# Patient Record
Sex: MALE | Race: WHITE | NOT HISPANIC OR LATINO
[De-identification: names, ages, dates, MRNs, and addresses within clinical notes are randomized per-mention and may not be internally consistent; named-entity substitution may affect disease eponyms.]

---

## 2017-09-10 ENCOUNTER — TRANSCRIPTION ENCOUNTER (OUTPATIENT)
Age: 42
End: 2017-09-10

## 2017-10-21 ENCOUNTER — TRANSCRIPTION ENCOUNTER (OUTPATIENT)
Age: 42
End: 2017-10-21

## 2018-01-21 ENCOUNTER — TRANSCRIPTION ENCOUNTER (OUTPATIENT)
Age: 43
End: 2018-01-21

## 2018-08-07 ENCOUNTER — APPOINTMENT (OUTPATIENT)
Dept: COLORECTAL SURGERY | Facility: CLINIC | Age: 43
End: 2018-08-07
Payer: COMMERCIAL

## 2018-08-07 VITALS — WEIGHT: 315 LBS | RESPIRATION RATE: 14 BRPM | HEIGHT: 75 IN | BODY MASS INDEX: 39.17 KG/M2

## 2018-08-07 DIAGNOSIS — Z85.831 PERSONAL HISTORY OF MALIGNANT NEOPLASM OF SOFT TISSUE: ICD-10-CM

## 2018-08-07 DIAGNOSIS — Z86.2 PERSONAL HISTORY OF DISEASES OF THE BLOOD AND BLOOD-FORMING ORGANS AND CERTAIN DISORDERS INVOLVING THE IMMUNE MECHANISM: ICD-10-CM

## 2018-08-07 DIAGNOSIS — D50.0 IRON DEFICIENCY ANEMIA SECONDARY TO BLOOD LOSS (CHRONIC): ICD-10-CM

## 2018-08-07 DIAGNOSIS — Z87.09 PERSONAL HISTORY OF OTHER DISEASES OF THE RESPIRATORY SYSTEM: ICD-10-CM

## 2018-08-07 DIAGNOSIS — Z71.9 COUNSELING, UNSPECIFIED: ICD-10-CM

## 2018-08-07 DIAGNOSIS — Z82.49 FAMILY HISTORY OF ISCHEMIC HEART DISEASE AND OTHER DISEASES OF THE CIRCULATORY SYSTEM: ICD-10-CM

## 2018-08-07 DIAGNOSIS — Z87.891 PERSONAL HISTORY OF NICOTINE DEPENDENCE: ICD-10-CM

## 2018-08-07 DIAGNOSIS — Z86.69 PERSONAL HISTORY OF OTHER DISEASES OF THE NERVOUS SYSTEM AND SENSE ORGANS: ICD-10-CM

## 2018-08-07 DIAGNOSIS — Z86.79 PERSONAL HISTORY OF OTHER DISEASES OF THE CIRCULATORY SYSTEM: ICD-10-CM

## 2018-08-07 PROBLEM — Z00.00 ENCOUNTER FOR PREVENTIVE HEALTH EXAMINATION: Status: ACTIVE | Noted: 2018-08-07

## 2018-08-07 PROCEDURE — 99205 OFFICE O/P NEW HI 60 MIN: CPT

## 2018-08-07 RX ORDER — NEOMYCIN SULFATE 500 MG/1
500 TABLET ORAL
Qty: 6 | Refills: 0 | Status: COMPLETED | COMMUNITY
Start: 2018-08-07 | End: 2018-08-08

## 2018-08-08 ENCOUNTER — TRANSCRIPTION ENCOUNTER (OUTPATIENT)
Age: 43
End: 2018-08-08

## 2018-08-11 PROBLEM — D50.0 IRON DEFICIENCY ANEMIA DUE TO CHRONIC BLOOD LOSS: Status: ACTIVE | Noted: 2018-08-11

## 2018-08-11 PROBLEM — Z85.831 HISTORY OF SARCOMA: Status: RESOLVED | Noted: 2018-08-07 | Resolved: 2018-08-11

## 2018-08-11 PROBLEM — Z86.2 HISTORY OF ANEMIA: Status: RESOLVED | Noted: 2018-08-07 | Resolved: 2018-08-11

## 2018-08-11 PROBLEM — Z86.79 HISTORY OF HYPERTENSION: Status: RESOLVED | Noted: 2018-08-07 | Resolved: 2018-08-11

## 2018-08-11 PROBLEM — Z87.09 HISTORY OF ASTHMA: Status: RESOLVED | Noted: 2018-08-07 | Resolved: 2018-08-11

## 2018-08-11 PROBLEM — Z87.891 FORMER SMOKER: Status: ACTIVE | Noted: 2018-08-07

## 2018-08-11 PROBLEM — Z86.69 HISTORY OF SLEEP APNEA: Status: RESOLVED | Noted: 2018-08-07 | Resolved: 2018-08-11

## 2018-08-11 PROBLEM — Z82.49 FAMILY HISTORY OF CARDIAC DISORDER: Status: ACTIVE | Noted: 2018-08-07

## 2018-08-11 PROBLEM — Z71.9 ENCOUNTER FOR CONSULTATION: Status: ACTIVE | Noted: 2018-08-07

## 2018-08-13 ENCOUNTER — OTHER (OUTPATIENT)
Age: 43
End: 2018-08-13

## 2018-08-15 ENCOUNTER — OTHER (OUTPATIENT)
Age: 43
End: 2018-08-15

## 2018-08-16 ENCOUNTER — RESULT REVIEW (OUTPATIENT)
Age: 43
End: 2018-08-16

## 2018-08-16 ENCOUNTER — APPOINTMENT (OUTPATIENT)
Dept: COLORECTAL SURGERY | Facility: AMBULATORY MEDICAL SERVICES | Age: 43
End: 2018-08-16
Payer: COMMERCIAL

## 2018-08-16 ENCOUNTER — OUTPATIENT (OUTPATIENT)
Dept: OUTPATIENT SERVICES | Facility: HOSPITAL | Age: 43
LOS: 1 days | Discharge: HOME | End: 2018-08-16
Payer: COMMERCIAL

## 2018-08-16 VITALS
SYSTOLIC BLOOD PRESSURE: 130 MMHG | TEMPERATURE: 98 F | HEIGHT: 74 IN | OXYGEN SATURATION: 96 % | HEART RATE: 91 BPM | RESPIRATION RATE: 18 BRPM | WEIGHT: 315 LBS | DIASTOLIC BLOOD PRESSURE: 84 MMHG

## 2018-08-16 DIAGNOSIS — C19 MALIGNANT NEOPLASM OF RECTOSIGMOID JUNCTION: ICD-10-CM

## 2018-08-16 DIAGNOSIS — Z41.9 ENCOUNTER FOR PROCEDURE FOR PURPOSES OTHER THAN REMEDYING HEALTH STATE, UNSPECIFIED: Chronic | ICD-10-CM

## 2018-08-16 DIAGNOSIS — Z90.89 ACQUIRED ABSENCE OF OTHER ORGANS: Chronic | ICD-10-CM

## 2018-08-16 DIAGNOSIS — Z98.890 OTHER SPECIFIED POSTPROCEDURAL STATES: Chronic | ICD-10-CM

## 2018-08-16 DIAGNOSIS — Z53.8 PROCEDURE AND TREATMENT NOT CARRIED OUT FOR OTHER REASONS: ICD-10-CM

## 2018-08-16 DIAGNOSIS — Z29.9 ENCOUNTER FOR PROPHYLACTIC MEASURES, UNSPECIFIED: ICD-10-CM

## 2018-08-16 DIAGNOSIS — Z01.818 ENCOUNTER FOR OTHER PREPROCEDURAL EXAMINATION: ICD-10-CM

## 2018-08-16 LAB
ABO RH CONFIRMATION: SIGNIFICANT CHANGE UP
ALBUMIN SERPL ELPH-MCNC: 4.4 G/DL — SIGNIFICANT CHANGE UP (ref 3.3–5)
ALP SERPL-CCNC: 69 U/L — SIGNIFICANT CHANGE UP (ref 40–120)
ALT FLD-CCNC: 36 U/L — SIGNIFICANT CHANGE UP (ref 12–78)
ANION GAP SERPL CALC-SCNC: 7 MMOL/L — SIGNIFICANT CHANGE UP (ref 5–17)
ANISOCYTOSIS BLD QL: SIGNIFICANT CHANGE UP
APTT BLD: 36 SEC — SIGNIFICANT CHANGE UP (ref 27.5–37.4)
AST SERPL-CCNC: 25 U/L — SIGNIFICANT CHANGE UP (ref 15–37)
BASOPHILS # BLD AUTO: 0.05 K/UL — SIGNIFICANT CHANGE UP (ref 0–0.2)
BASOPHILS NFR BLD AUTO: 0.9 % — SIGNIFICANT CHANGE UP (ref 0–2)
BILIRUB DIRECT SERPL-MCNC: 0.2 MG/DL — SIGNIFICANT CHANGE UP (ref 0–0.2)
BILIRUB INDIRECT FLD-MCNC: 0.7 MG/DL — SIGNIFICANT CHANGE UP (ref 0.2–1)
BILIRUB SERPL-MCNC: 0.9 MG/DL — SIGNIFICANT CHANGE UP (ref 0.2–1.2)
BLD GP AB SCN SERPL QL: SIGNIFICANT CHANGE UP
BUN SERPL-MCNC: 8 MG/DL — SIGNIFICANT CHANGE UP (ref 7–23)
CALCIUM SERPL-MCNC: 8.9 MG/DL — SIGNIFICANT CHANGE UP (ref 8.5–10.1)
CEA SERPL-MCNC: 17.4 NG/ML — HIGH (ref 0–3.8)
CHLORIDE SERPL-SCNC: 105 MMOL/L — SIGNIFICANT CHANGE UP (ref 96–108)
CO2 SERPL-SCNC: 26 MMOL/L — SIGNIFICANT CHANGE UP (ref 22–31)
CREAT SERPL-MCNC: 1.02 MG/DL — SIGNIFICANT CHANGE UP (ref 0.5–1.3)
DACRYOCYTES BLD QL SMEAR: SLIGHT — SIGNIFICANT CHANGE UP
ELLIPTOCYTES BLD QL SMEAR: SLIGHT — SIGNIFICANT CHANGE UP
EOSINOPHIL # BLD AUTO: 0.24 K/UL — SIGNIFICANT CHANGE UP (ref 0–0.5)
EOSINOPHIL NFR BLD AUTO: 4.4 % — SIGNIFICANT CHANGE UP (ref 0–6)
GLUCOSE SERPL-MCNC: 115 MG/DL — HIGH (ref 70–99)
HBA1C BLD-MCNC: 5 % — SIGNIFICANT CHANGE UP (ref 4–5.6)
HCT VFR BLD CALC: 36.8 % — LOW (ref 39–50)
HGB BLD-MCNC: 10.3 G/DL — LOW (ref 13–17)
HYPOCHROMIA BLD QL: SIGNIFICANT CHANGE UP
IMM GRANULOCYTES NFR BLD AUTO: 0.2 % — SIGNIFICANT CHANGE UP (ref 0–1.5)
INR BLD: 1.24 RATIO — HIGH (ref 0.88–1.16)
LYMPHOCYTES # BLD AUTO: 1.18 K/UL — SIGNIFICANT CHANGE UP (ref 1–3.3)
LYMPHOCYTES # BLD AUTO: 21.5 % — SIGNIFICANT CHANGE UP (ref 13–44)
MACROCYTES BLD QL: SLIGHT — SIGNIFICANT CHANGE UP
MANUAL SMEAR VERIFICATION: SIGNIFICANT CHANGE UP
MCHC RBC-ENTMCNC: 18.2 PG — LOW (ref 27–34)
MCHC RBC-ENTMCNC: 28 GM/DL — LOW (ref 32–36)
MCV RBC AUTO: 65 FL — LOW (ref 80–100)
MICROCYTES BLD QL: SIGNIFICANT CHANGE UP
MONOCYTES # BLD AUTO: 0.46 K/UL — SIGNIFICANT CHANGE UP (ref 0–0.9)
MONOCYTES NFR BLD AUTO: 8.4 % — SIGNIFICANT CHANGE UP (ref 2–14)
NEUTROPHILS # BLD AUTO: 3.54 K/UL — SIGNIFICANT CHANGE UP (ref 1.8–7.4)
NEUTROPHILS NFR BLD AUTO: 64.6 % — SIGNIFICANT CHANGE UP (ref 43–77)
NRBC # BLD: 0 /100 WBCS — SIGNIFICANT CHANGE UP (ref 0–0)
PLAT MORPH BLD: NORMAL — SIGNIFICANT CHANGE UP
PLATELET # BLD AUTO: 236 K/UL — SIGNIFICANT CHANGE UP (ref 150–400)
POIKILOCYTOSIS BLD QL AUTO: SIGNIFICANT CHANGE UP
POLYCHROMASIA BLD QL SMEAR: SLIGHT — SIGNIFICANT CHANGE UP
POTASSIUM SERPL-MCNC: 4 MMOL/L — SIGNIFICANT CHANGE UP (ref 3.5–5.3)
POTASSIUM SERPL-SCNC: 4 MMOL/L — SIGNIFICANT CHANGE UP (ref 3.5–5.3)
PROT SERPL-MCNC: 7.9 GM/DL — SIGNIFICANT CHANGE UP (ref 6–8.3)
PROTHROM AB SERPL-ACNC: 13.4 SEC — HIGH (ref 9.8–12.7)
RBC # BLD: 5.66 M/UL — SIGNIFICANT CHANGE UP (ref 4.2–5.8)
RBC # FLD: 28.5 % — HIGH (ref 10.3–14.5)
RBC BLD AUTO: ABNORMAL
SODIUM SERPL-SCNC: 138 MMOL/L — SIGNIFICANT CHANGE UP (ref 135–145)
TYPE + AB SCN PNL BLD: SIGNIFICANT CHANGE UP
WBC # BLD: 5.48 K/UL — SIGNIFICANT CHANGE UP (ref 3.8–10.5)
WBC # FLD AUTO: 5.48 K/UL — SIGNIFICANT CHANGE UP (ref 3.8–10.5)

## 2018-08-16 PROCEDURE — 71046 X-RAY EXAM CHEST 2 VIEWS: CPT | Mod: 26

## 2018-08-16 PROCEDURE — 45380 COLONOSCOPY AND BIOPSY: CPT

## 2018-08-16 PROCEDURE — 93010 ELECTROCARDIOGRAM REPORT: CPT

## 2018-08-16 NOTE — H&P PST ADULT - PMH
Colorectal cancer    Essential hypertension    Foot drop, left    Iron deficiency anemia due to chronic blood loss    Lumbar herniated disc    Mild intermittent asthma without complication    Morbid obesity    Obstructive sleep apnea syndrome  recently tested  Sarcoma    Seasonal allergies

## 2018-08-16 NOTE — H&P PST ADULT - PSH
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 tube in right ear presently  S/P T&A (status post tonsillectomy and adenoidectomy)

## 2018-08-16 NOTE — H&P PST ADULT - HISTORY OF PRESENT ILLNESS
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 He had an abnormal CBC. He had a colonoscopy 7/24/2018 because of the abnormal blood work. Admits to abdominal bloating. Admits to feel of not emptying his bowels completely. Planned colonoscopy 8/16/2018 and mass resection on 8/20/2018.

## 2018-08-16 NOTE — H&P PST ADULT - FAMILY HISTORY
Father  Still living? Yes, Estimated age: 81-90  Family history of hypertension, Age at diagnosis: Age Unknown  Family history of renal disease, Age at diagnosis: Age Unknown

## 2018-08-16 NOTE — H&P PST ADULT - ASSESSMENT
43 years old male present to PST prior to robotic assisted laparoscopic, possible open low anterior resection with lymph node dissection with Dr. Velarde.  Plan   1. NPO after midnight  2. Take the following medications with sips of water on the day of procedure: may use Fluticasone. Bring Albuterol to hospital on the day of surgery  3. Use E-Z sponge as directed  4. Day of procedure instructions given  5. Drink a quart of extra  fluids the day before your surgery.  6 Medical clearance with Dr. Calixto and cardiac clearance with Dr. Flores  7. CBC, BMP, LFT, CEA, PT/ INR and PTT, Type and Screen sent to lab  8. EKG and Chest X-Ray done    CAPRINI SCORE [CLOT]    AGE RELATED RISK FACTORS                                                       MOBILITY RELATED FACTORS  [ x] Age 41-60 years                                            (1 Point)                  [ ] Bed rest                                                        (1 Point)  [ ] Age: 61-74 years                                           (2 Points)                 [ ] Plaster cast                                                   (2 Points)  [ ] Age= 75 years                                              (3 Points)                 [ ] Bed bound for more than 72 hours                 (2 Points)    DISEASE RELATED RISK FACTORS                                               GENDER SPECIFIC FACTORS  [ ] Edema in the lower extremities                       (1 Point)                  [ ] Pregnancy                                                     (1 Point)  [ ] Varicose veins                                               (1 Point)                  [ ] Post-partum < 6 weeks                                   (1 Point)             [x ] BMI > 25 Kg/m2                                            (1 Point)                  [ ] Hormonal therapy  or oral contraception          (1 Point)                 [ ] Sepsis (in the previous month)                        (1 Point)                  [ ] History of pregnancy complications                 (1 point)  [ ] Pneumonia or serious lung disease                                               [ ] Unexplained or recurrent                     (1 Point)           (in the previous month)                               (1 Point)  [ ] Abnormal pulmonary function test                     (1 Point)                 SURGERY RELATED RISK FACTORS  [ ] Acute myocardial infarction                              (1 Point)                 [ ]  Section                                             (1 Point)  [ ] Congestive heart failure (in the previous month)  (1 Point)               [ ] Minor surgery                                                  (1 Point)   [ ] Inflammatory bowel disease                             (1 Point)                 [ ] Arthroscopic surgery                                        (2 Points)  [ ] Central venous access                                      (2 Points)                [x ] General surgery lasting more than 45 minutes   (2 Points)       [ ] Stroke (in the previous month)                          (5 Points)               [ ] Elective arthroplasty                                         (5 Points)                                                                                                                                               HEMATOLOGY RELATED FACTORS                                                 TRAUMA RELATED RISK FACTORS  [ ] Prior episodes of VTE                                     (3 Points)                 [ ] Fracture of the hip, pelvis, or leg                       (5 Points)  [ ] Positive family history for VTE                         (3 Points)                 [ ] Acute spinal cord injury (in the previous month)  (5 Points)  [ ] Prothrombin 75621 A                                     (3 Points)                 [ ] Paralysis  (less than 1 month)                             (5 Points)  [ ] Factor V Leiden                                             (3 Points)                  [ ] Multiple Trauma within 1 month                        (5 Points)  [ ] Lupus anticoagulants                                     (3 Points)                                                           [ ] Anticardiolipin antibodies                               (3 Points)                                                       [ ] High homocysteine in the blood                      (3 Points)                                             [ ] Other congenital or acquired thrombophilia      (3 Points)                                                [ ] Heparin induced thrombocytopenia                  (3 Points)                                          Total Score [      4    ]

## 2018-08-16 NOTE — H&P PST ADULT - TEACHING/LEARNING LEARNING PREFERENCES
group instruction/individual instruction/video/written material/audio/pictorial/verbal instruction/computer/internet/skill demonstration

## 2018-08-21 ENCOUNTER — OUTPATIENT (OUTPATIENT)
Dept: OUTPATIENT SERVICES | Facility: HOSPITAL | Age: 43
LOS: 1 days | Discharge: HOME | End: 2018-08-21
Payer: COMMERCIAL

## 2018-08-21 ENCOUNTER — RESULT REVIEW (OUTPATIENT)
Age: 43
End: 2018-08-21

## 2018-08-21 DIAGNOSIS — Z90.89 ACQUIRED ABSENCE OF OTHER ORGANS: Chronic | ICD-10-CM

## 2018-08-21 DIAGNOSIS — C19 MALIGNANT NEOPLASM OF RECTOSIGMOID JUNCTION: ICD-10-CM

## 2018-08-21 DIAGNOSIS — Z98.890 OTHER SPECIFIED POSTPROCEDURAL STATES: Chronic | ICD-10-CM

## 2018-08-21 DIAGNOSIS — Z41.9 ENCOUNTER FOR PROCEDURE FOR PURPOSES OTHER THAN REMEDYING HEALTH STATE, UNSPECIFIED: Chronic | ICD-10-CM

## 2018-08-21 PROBLEM — M51.26 OTHER INTERVERTEBRAL DISC DISPLACEMENT, LUMBAR REGION: Chronic | Status: ACTIVE | Noted: 2018-08-16

## 2018-08-21 PROBLEM — J45.20 MILD INTERMITTENT ASTHMA, UNCOMPLICATED: Chronic | Status: ACTIVE | Noted: 2018-08-16

## 2018-08-21 PROBLEM — D50.0 IRON DEFICIENCY ANEMIA SECONDARY TO BLOOD LOSS (CHRONIC): Chronic | Status: ACTIVE | Noted: 2018-08-16

## 2018-08-21 PROBLEM — M21.372 FOOT DROP, LEFT FOOT: Chronic | Status: ACTIVE | Noted: 2018-08-16

## 2018-08-21 PROBLEM — C49.9 MALIGNANT NEOPLASM OF CONNECTIVE AND SOFT TISSUE, UNSPECIFIED: Chronic | Status: ACTIVE | Noted: 2018-08-16

## 2018-08-21 PROBLEM — J30.2 OTHER SEASONAL ALLERGIC RHINITIS: Chronic | Status: ACTIVE | Noted: 2018-08-16

## 2018-08-21 PROBLEM — I10 ESSENTIAL (PRIMARY) HYPERTENSION: Chronic | Status: ACTIVE | Noted: 2018-08-16

## 2018-08-21 PROBLEM — E66.01 MORBID (SEVERE) OBESITY DUE TO EXCESS CALORIES: Chronic | Status: ACTIVE | Noted: 2018-08-16

## 2018-08-21 PROCEDURE — 88321 CONSLTJ&REPRT SLD PREP ELSWR: CPT

## 2018-08-24 LAB — SURGICAL PATHOLOGY FINAL REPORT - CH: SIGNIFICANT CHANGE UP

## 2018-11-06 ENCOUNTER — APPOINTMENT (OUTPATIENT)
Dept: COLORECTAL SURGERY | Facility: CLINIC | Age: 43
End: 2018-11-06
Payer: COMMERCIAL

## 2018-11-06 VITALS
SYSTOLIC BLOOD PRESSURE: 141 MMHG | WEIGHT: 315 LBS | BODY MASS INDEX: 39.17 KG/M2 | DIASTOLIC BLOOD PRESSURE: 87 MMHG | HEIGHT: 75 IN | RESPIRATION RATE: 14 BRPM | HEART RATE: 87 BPM

## 2018-11-06 DIAGNOSIS — Z78.9 OTHER LONG TERM (CURRENT) DRUG THERAPY: ICD-10-CM

## 2018-11-06 DIAGNOSIS — Z79.899 OTHER LONG TERM (CURRENT) DRUG THERAPY: ICD-10-CM

## 2018-11-06 PROCEDURE — 99215 OFFICE O/P EST HI 40 MIN: CPT

## 2018-11-07 PROBLEM — Z79.899 PATIENT ON COMBINED CHEMOTHERAPY AND RADIATION: Status: ACTIVE | Noted: 2018-11-07

## 2018-11-14 ENCOUNTER — OUTPATIENT (OUTPATIENT)
Dept: OUTPATIENT SERVICES | Facility: HOSPITAL | Age: 43
LOS: 1 days | Discharge: HOME | End: 2018-11-14

## 2018-11-14 VITALS
HEART RATE: 100 BPM | RESPIRATION RATE: 20 BRPM | TEMPERATURE: 98 F | DIASTOLIC BLOOD PRESSURE: 67 MMHG | OXYGEN SATURATION: 100 % | HEIGHT: 75 IN | SYSTOLIC BLOOD PRESSURE: 127 MMHG | WEIGHT: 309.97 LBS

## 2018-11-14 DIAGNOSIS — Z29.9 ENCOUNTER FOR PROPHYLACTIC MEASURES, UNSPECIFIED: ICD-10-CM

## 2018-11-14 DIAGNOSIS — Z90.89 ACQUIRED ABSENCE OF OTHER ORGANS: Chronic | ICD-10-CM

## 2018-11-14 DIAGNOSIS — C20 MALIGNANT NEOPLASM OF RECTUM: ICD-10-CM

## 2018-11-14 DIAGNOSIS — Z98.890 OTHER SPECIFIED POSTPROCEDURAL STATES: Chronic | ICD-10-CM

## 2018-11-14 DIAGNOSIS — Z41.9 ENCOUNTER FOR PROCEDURE FOR PURPOSES OTHER THAN REMEDYING HEALTH STATE, UNSPECIFIED: Chronic | ICD-10-CM

## 2018-11-14 LAB
ALBUMIN SERPL ELPH-MCNC: 4.1 G/DL — SIGNIFICANT CHANGE UP (ref 3.3–5)
ALP SERPL-CCNC: 80 U/L — SIGNIFICANT CHANGE UP (ref 40–120)
ALT FLD-CCNC: 52 U/L — SIGNIFICANT CHANGE UP (ref 12–78)
ANION GAP SERPL CALC-SCNC: 9 MMOL/L — SIGNIFICANT CHANGE UP (ref 5–17)
APTT BLD: 37 SEC — HIGH (ref 27.5–36.3)
AST SERPL-CCNC: 37 U/L — SIGNIFICANT CHANGE UP (ref 15–37)
BASOPHILS # BLD AUTO: 0.03 K/UL — SIGNIFICANT CHANGE UP (ref 0–0.2)
BASOPHILS NFR BLD AUTO: 1 % — SIGNIFICANT CHANGE UP (ref 0–2)
BILIRUB DIRECT SERPL-MCNC: 0.2 MG/DL — SIGNIFICANT CHANGE UP (ref 0–0.2)
BILIRUB INDIRECT FLD-MCNC: 0.7 MG/DL — SIGNIFICANT CHANGE UP (ref 0.2–1)
BILIRUB SERPL-MCNC: 0.9 MG/DL — SIGNIFICANT CHANGE UP (ref 0.2–1.2)
BLD GP AB SCN SERPL QL: SIGNIFICANT CHANGE UP
BUN SERPL-MCNC: 16 MG/DL — SIGNIFICANT CHANGE UP (ref 7–23)
CALCIUM SERPL-MCNC: 9.3 MG/DL — SIGNIFICANT CHANGE UP (ref 8.5–10.1)
CEA SERPL-MCNC: 1.5 NG/ML — SIGNIFICANT CHANGE UP (ref 0–3.8)
CHLORIDE SERPL-SCNC: 107 MMOL/L — SIGNIFICANT CHANGE UP (ref 96–108)
CO2 SERPL-SCNC: 24 MMOL/L — SIGNIFICANT CHANGE UP (ref 22–31)
CREAT SERPL-MCNC: 0.9 MG/DL — SIGNIFICANT CHANGE UP (ref 0.5–1.3)
EOSINOPHIL # BLD AUTO: 0.03 K/UL — SIGNIFICANT CHANGE UP (ref 0–0.5)
EOSINOPHIL NFR BLD AUTO: 1 % — SIGNIFICANT CHANGE UP (ref 0–6)
GLUCOSE SERPL-MCNC: 96 MG/DL — SIGNIFICANT CHANGE UP (ref 70–99)
HBA1C BLD-MCNC: 5.4 % — SIGNIFICANT CHANGE UP (ref 4–5.6)
HCT VFR BLD CALC: 44.5 % — SIGNIFICANT CHANGE UP (ref 39–50)
HGB BLD-MCNC: 15 G/DL — SIGNIFICANT CHANGE UP (ref 13–17)
INR BLD: 1.05 RATIO — SIGNIFICANT CHANGE UP (ref 0.88–1.16)
LYMPHOCYTES # BLD AUTO: 0.51 K/UL — LOW (ref 1–3.3)
LYMPHOCYTES # BLD AUTO: 15 % — SIGNIFICANT CHANGE UP (ref 13–44)
MANUAL SMEAR VERIFICATION: SIGNIFICANT CHANGE UP
MCHC RBC-ENTMCNC: 27.3 PG — SIGNIFICANT CHANGE UP (ref 27–34)
MCHC RBC-ENTMCNC: 33.7 GM/DL — SIGNIFICANT CHANGE UP (ref 32–36)
MCV RBC AUTO: 80.9 FL — SIGNIFICANT CHANGE UP (ref 80–100)
MONOCYTES # BLD AUTO: 0.41 K/UL — SIGNIFICANT CHANGE UP (ref 0–0.9)
MONOCYTES NFR BLD AUTO: 12 % — SIGNIFICANT CHANGE UP (ref 2–14)
NEUTROPHILS # BLD AUTO: 2.39 K/UL — SIGNIFICANT CHANGE UP (ref 1.8–7.4)
NEUTROPHILS NFR BLD AUTO: 70 % — SIGNIFICANT CHANGE UP (ref 43–77)
NRBC # BLD: 0 /100 — SIGNIFICANT CHANGE UP (ref 0–0)
NRBC # BLD: SIGNIFICANT CHANGE UP /100 WBCS (ref 0–0)
PLAT MORPH BLD: NORMAL — SIGNIFICANT CHANGE UP
PLATELET # BLD AUTO: 145 K/UL — LOW (ref 150–400)
POTASSIUM SERPL-MCNC: 4 MMOL/L — SIGNIFICANT CHANGE UP (ref 3.5–5.3)
POTASSIUM SERPL-SCNC: 4 MMOL/L — SIGNIFICANT CHANGE UP (ref 3.5–5.3)
PROT SERPL-MCNC: 8.2 GM/DL — SIGNIFICANT CHANGE UP (ref 6–8.3)
PROTHROM AB SERPL-ACNC: 11.7 SEC — SIGNIFICANT CHANGE UP (ref 10–12.9)
RBC # BLD: 5.5 M/UL — SIGNIFICANT CHANGE UP (ref 4.2–5.8)
RBC # FLD: 19.2 % — HIGH (ref 10.3–14.5)
RBC BLD AUTO: NORMAL — SIGNIFICANT CHANGE UP
SODIUM SERPL-SCNC: 140 MMOL/L — SIGNIFICANT CHANGE UP (ref 135–145)
TYPE + AB SCN PNL BLD: SIGNIFICANT CHANGE UP
VARIANT LYMPHS # BLD: 1 % — SIGNIFICANT CHANGE UP (ref 0–6)
WBC # BLD: 3.41 K/UL — LOW (ref 3.8–10.5)
WBC # FLD AUTO: 3.41 K/UL — LOW (ref 3.8–10.5)

## 2018-11-14 RX ORDER — ALBUTEROL 90 UG/1
2 AEROSOL, METERED ORAL
Qty: 0 | Refills: 0 | COMMUNITY

## 2018-11-14 RX ORDER — FLUTICASONE PROPIONATE 50 MCG
2 SPRAY, SUSPENSION NASAL
Qty: 0 | Refills: 0 | COMMUNITY

## 2018-11-14 NOTE — H&P PST ADULT - PMH
Anemia    Essential hypertension    Foot drop, left    Hearing loss  b/l  Iron deficiency anemia due to chronic blood loss    Lumbar herniated disc    Mild intermittent asthma without complication    Morbid obesity    Obstructive sleep apnea syndrome  not using machine  Port-A-Cath in place  right chest wall  Rectal cancer  completed chemotherapy 2 weeks ago and radiation a month ago  Sarcoma    Seasonal allergies

## 2018-11-14 NOTE — H&P PST ADULT - PROBLEM SELECTOR PLAN 1
The Caprini score indicates that this patient is at high risk for a VTE event (score => 6).    Ssurgical patients in this group will benefit from both pharmacologic prophylaxis and intermittent compression devices.    The surgical team will determine the balance between VTE risk and bleeding risk, and other clinical considerations.

## 2018-11-14 NOTE — H&P PST ADULT - HISTORY OF PRESENT ILLNESS
43 years old male with rectal cancer. Pt reports he completed chemotherapy and radiation therapy. Denies rectal bleeding and pain. Now scheduled for Laparoscopic possible open ultra low anterior resection, proctectomy, lymph node dissection and temporary ileostomy.

## 2018-11-14 NOTE — H&P PST ADULT - PSH
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 tube in right ear presently  History of other surgery  port cath insertion 08/2018  S/P colonoscopy    S/P T&A (status post tonsillectomy and adenoidectomy)

## 2018-11-14 NOTE — H&P PST ADULT - TEACHING/LEARNING LEARNING PREFERENCES
audio/individual instruction/written material/computer/internet/group instruction/video/pictorial/skill demonstration/verbal instruction

## 2018-11-14 NOTE — H&P PST ADULT - ASSESSMENT
43 years old male with rectal cancer. Pt reports he completed chemotherapy and radiation therapy. Denies rectal bleeding and pain. Now scheduled for Laparoscopic possible open ultra low anterior resection, proctectomy, lymph node dissection and temporary ileostomy.  1. NPO after midnight  2. Use E-Z sponge as directed  3. PMD and cardiologist visit for optimization prior to surgery as per surgeon  4. CBC, BMP, LFT, CEA, PT/ INR and PTT, Type and Screen as per surgeon  5. EKG and Chest X-Ray on chart    CAPRINI SCORE [CLOT]    AGE RELATED RISK FACTORS                                                       MOBILITY RELATED FACTORS  [ x] Age 41-60 years                                            (1 Point)                  [ ] Bed rest                                                        (1 Point)  [ ] Age: 61-74 years                                           (2 Points)                 [ ] Plaster cast                                                   (2 Points)  [ ] Age= 75 years                                              (3 Points)                 [ ] Bed bound for more than 72 hours                 (2 Points)    DISEASE RELATED RISK FACTORS                                               GENDER SPECIFIC FACTORS  [ ] Edema in the lower extremities                       (1 Point)                  [ ] Pregnancy                                                     (1 Point)  [ ] Varicose veins                                               (1 Point)                  [ ] Post-partum < 6 weeks                                   (1 Point)             [x ] BMI > 25 Kg/m2                                            (1 Point)                  [ ] Hormonal therapy  or oral contraception          (1 Point)                 [ ] Sepsis (in the previous month)                        (1 Point)                  [ ] History of pregnancy complications                 (1 point)  [ ] Pneumonia or serious lung disease                                               [ ] Unexplained or recurrent                     (1 Point)           (in the previous month)                               (1 Point)  [ ] Abnormal pulmonary function test                     (1 Point)                 SURGERY RELATED RISK FACTORS  [ ] Acute myocardial infarction                              (1 Point)                 [ ]  Section                                             (1 Point)  [ ] Congestive heart failure (in the previous month)  (1 Point)               [ ] Minor surgery                                                  (1 Point)   [ ] Inflammatory bowel disease                             (1 Point)                 [ ] Arthroscopic surgery                                        (2 Points)  [ ] Central venous access                                      (2 Points)                [x ] General surgery lasting more than 45 minutes   (2 Points)       [ ] Stroke (in the previous month)                         (5 Points)               [ ] Elective arthroplasty                                         (5 Points)                                                                                                                                               HEMATOLOGY RELATED FACTORS                                                 TRAUMA RELATED RISK FACTORS  [ ] Prior episodes of VTE                                     (3 Points)                 [ ] Fracture of the hip, pelvis, or leg                       (5 Points)  [ ] Positive family history for VTE                         (3 Points)                 [ ] Acute spinal cord injury (in the previous month)  (5 Points)  [ ] Prothrombin 60593 A                                     (3 Points)                 [ ] Paralysis  (less than 1 month)                             (5 Points)  [ ] Factor V Leiden                                             (3 Points)                  [ ] Multiple Trauma within 1 month                        (5 Points)  [ ] Lupus anticoagulants                                     (3 Points)                                                           [ ] Anticardiolipin antibodies                               (3 Points)                                                       [ ] High homocysteine in the blood                      (3 Points)                                             [ ] Other congenital or acquired thrombophilia      (3 Points)                                                [ ] Heparin induced thrombocytopenia                  (3 Points)    ( x ) malignmancy                                        Total Score [      6    ]

## 2018-11-15 PROBLEM — C19 MALIGNANT NEOPLASM OF RECTOSIGMOID JUNCTION: Chronic | Status: INACTIVE | Noted: 2018-08-16 | Resolved: 2018-11-14

## 2018-11-26 ENCOUNTER — OTHER (OUTPATIENT)
Age: 43
End: 2018-11-26

## 2018-11-26 PROBLEM — H91.90 UNSPECIFIED HEARING LOSS, UNSPECIFIED EAR: Chronic | Status: ACTIVE | Noted: 2018-11-14

## 2018-11-27 ENCOUNTER — RESULT REVIEW (OUTPATIENT)
Age: 43
End: 2018-11-27

## 2018-11-27 ENCOUNTER — APPOINTMENT (OUTPATIENT)
Dept: COLORECTAL SURGERY | Facility: HOSPITAL | Age: 43
End: 2018-11-27
Payer: COMMERCIAL

## 2018-11-27 ENCOUNTER — OUTPATIENT (OUTPATIENT)
Dept: OUTPATIENT SERVICES | Facility: HOSPITAL | Age: 43
LOS: 1 days | Discharge: HOME | End: 2018-11-27
Payer: COMMERCIAL

## 2018-11-27 VITALS
OXYGEN SATURATION: 93 % | HEART RATE: 89 BPM | RESPIRATION RATE: 18 BRPM | WEIGHT: 315 LBS | HEIGHT: 75 IN | TEMPERATURE: 97 F | SYSTOLIC BLOOD PRESSURE: 144 MMHG | DIASTOLIC BLOOD PRESSURE: 95 MMHG

## 2018-11-27 DIAGNOSIS — Z41.9 ENCOUNTER FOR PROCEDURE FOR PURPOSES OTHER THAN REMEDYING HEALTH STATE, UNSPECIFIED: Chronic | ICD-10-CM

## 2018-11-27 DIAGNOSIS — Z98.890 OTHER SPECIFIED POSTPROCEDURAL STATES: Chronic | ICD-10-CM

## 2018-11-27 DIAGNOSIS — Z90.89 ACQUIRED ABSENCE OF OTHER ORGANS: Chronic | ICD-10-CM

## 2018-11-27 PROCEDURE — 45378 DIAGNOSTIC COLONOSCOPY: CPT

## 2018-11-27 PROCEDURE — 88305 TISSUE EXAM BY PATHOLOGIST: CPT | Mod: 26

## 2018-11-27 PROCEDURE — XXXXX: CPT

## 2018-11-27 RX ORDER — METRONIDAZOLE 500 MG
1 TABLET ORAL
Qty: 0 | Refills: 0 | COMMUNITY

## 2018-11-27 NOTE — ASU PATIENT PROFILE, ADULT - NS TRANSFER PATIENT BELONGINGS
Other belongings/Jewelry/Money (specify)/Clothing/necklace, ring, and wallet given to parents/Cell Phone/PDA (specify)

## 2018-11-28 LAB — SURGICAL PATHOLOGY FINAL REPORT - CH: SIGNIFICANT CHANGE UP

## 2018-12-05 ENCOUNTER — APPOINTMENT (OUTPATIENT)
Dept: COLORECTAL SURGERY | Facility: CLINIC | Age: 43
End: 2018-12-05
Payer: COMMERCIAL

## 2018-12-05 VITALS
HEART RATE: 78 BPM | DIASTOLIC BLOOD PRESSURE: 84 MMHG | SYSTOLIC BLOOD PRESSURE: 131 MMHG | BODY MASS INDEX: 39.17 KG/M2 | WEIGHT: 315 LBS | RESPIRATION RATE: 14 BRPM | HEIGHT: 75 IN

## 2018-12-05 PROCEDURE — 99213 OFFICE O/P EST LOW 20 MIN: CPT

## 2018-12-06 RX ORDER — NEOMYCIN SULFATE 500 MG/1
500 TABLET ORAL
Qty: 6 | Refills: 0 | Status: COMPLETED | COMMUNITY
Start: 2018-12-06 | End: 2018-12-07

## 2018-12-10 ENCOUNTER — APPOINTMENT (OUTPATIENT)
Dept: COLORECTAL SURGERY | Facility: HOSPITAL | Age: 43
End: 2018-12-10
Payer: COMMERCIAL

## 2018-12-10 ENCOUNTER — RESULT REVIEW (OUTPATIENT)
Age: 43
End: 2018-12-10

## 2018-12-10 ENCOUNTER — INPATIENT (INPATIENT)
Facility: HOSPITAL | Age: 43
LOS: 3 days | Discharge: HOME HEALTH | End: 2018-12-14
Attending: COLON & RECTAL SURGERY | Admitting: COLON & RECTAL SURGERY
Payer: COMMERCIAL

## 2018-12-10 VITALS
WEIGHT: 309.97 LBS | DIASTOLIC BLOOD PRESSURE: 96 MMHG | HEIGHT: 75 IN | HEART RATE: 86 BPM | OXYGEN SATURATION: 97 % | SYSTOLIC BLOOD PRESSURE: 145 MMHG | TEMPERATURE: 97 F | RESPIRATION RATE: 16 BRPM

## 2018-12-10 DIAGNOSIS — Z90.89 ACQUIRED ABSENCE OF OTHER ORGANS: Chronic | ICD-10-CM

## 2018-12-10 DIAGNOSIS — Z98.890 OTHER SPECIFIED POSTPROCEDURAL STATES: Chronic | ICD-10-CM

## 2018-12-10 DIAGNOSIS — Z41.9 ENCOUNTER FOR PROCEDURE FOR PURPOSES OTHER THAN REMEDYING HEALTH STATE, UNSPECIFIED: Chronic | ICD-10-CM

## 2018-12-10 LAB
ALLERGY+IMMUNOLOGY DIAG STUDY NOTE: SIGNIFICANT CHANGE UP
GLUCOSE BLDC GLUCOMTR-MCNC: 125 MG/DL — HIGH (ref 70–99)
GLUCOSE BLDC GLUCOMTR-MCNC: 130 MG/DL — HIGH (ref 70–99)
GLUCOSE BLDC GLUCOMTR-MCNC: 137 MG/DL — HIGH (ref 70–99)
GLUCOSE BLDC GLUCOMTR-MCNC: 152 MG/DL — HIGH (ref 70–99)
GLUCOSE BLDC GLUCOMTR-MCNC: 152 MG/DL — HIGH (ref 70–99)

## 2018-12-10 PROCEDURE — 38747 REMOVE ABDOMINAL LYMPH NODES: CPT

## 2018-12-10 PROCEDURE — 44187 LAP ILEO/JEJUNO-STOMY: CPT

## 2018-12-10 PROCEDURE — 44207 L COLECTOMY/COLOPROCTOSTOMY: CPT | Mod: AS

## 2018-12-10 PROCEDURE — 44213 LAP MOBIL SPLENIC FL ADD-ON: CPT

## 2018-12-10 PROCEDURE — 44213 LAP MOBIL SPLENIC FL ADD-ON: CPT | Mod: AS

## 2018-12-10 PROCEDURE — 38570 LAPAROSCOPY LYMPH NODE BIOP: CPT

## 2018-12-10 PROCEDURE — 38570 LAPAROSCOPY LYMPH NODE BIOP: CPT | Mod: AS

## 2018-12-10 PROCEDURE — 88309 TISSUE EXAM BY PATHOLOGIST: CPT | Mod: 26

## 2018-12-10 PROCEDURE — 44207 L COLECTOMY/COLOPROCTOSTOMY: CPT

## 2018-12-10 PROCEDURE — 44187 LAP ILEO/JEJUNO-STOMY: CPT | Mod: AS

## 2018-12-10 PROCEDURE — 45300 PROCTOSIGMOIDOSCOPY DX: CPT

## 2018-12-10 PROCEDURE — 88304 TISSUE EXAM BY PATHOLOGIST: CPT | Mod: 26

## 2018-12-10 RX ORDER — ACETAMINOPHEN 500 MG
650 TABLET ORAL EVERY 6 HOURS
Qty: 0 | Refills: 0 | Status: DISCONTINUED | OUTPATIENT
Start: 2018-12-10 | End: 2018-12-14

## 2018-12-10 RX ORDER — KETOROLAC TROMETHAMINE 30 MG/ML
30 SYRINGE (ML) INJECTION EVERY 6 HOURS
Qty: 0 | Refills: 0 | Status: DISCONTINUED | OUTPATIENT
Start: 2018-12-10 | End: 2018-12-12

## 2018-12-10 RX ORDER — SODIUM CHLORIDE 9 MG/ML
3 INJECTION INTRAMUSCULAR; INTRAVENOUS; SUBCUTANEOUS EVERY 8 HOURS
Qty: 0 | Refills: 0 | Status: DISCONTINUED | OUTPATIENT
Start: 2018-12-10 | End: 2018-12-10

## 2018-12-10 RX ORDER — SODIUM CHLORIDE 9 MG/ML
1000 INJECTION, SOLUTION INTRAVENOUS
Qty: 0 | Refills: 0 | Status: DISCONTINUED | OUTPATIENT
Start: 2018-12-10 | End: 2018-12-10

## 2018-12-10 RX ORDER — HEPARIN SODIUM 5000 [USP'U]/ML
5000 INJECTION INTRAVENOUS; SUBCUTANEOUS EVERY 8 HOURS
Qty: 0 | Refills: 0 | Status: DISCONTINUED | OUTPATIENT
Start: 2018-12-11 | End: 2018-12-14

## 2018-12-10 RX ORDER — ALVIMOPAN 12 MG/1
12 CAPSULE ORAL
Qty: 0 | Refills: 0 | Status: DISCONTINUED | OUTPATIENT
Start: 2018-12-11 | End: 2018-12-12

## 2018-12-10 RX ORDER — SODIUM CHLORIDE 9 MG/ML
1000 INJECTION, SOLUTION INTRAVENOUS
Qty: 0 | Refills: 0 | Status: DISCONTINUED | OUTPATIENT
Start: 2018-12-10 | End: 2018-12-12

## 2018-12-10 RX ORDER — CEFOTETAN DISODIUM 1 G
2 VIAL (EA) INJECTION EVERY 12 HOURS
Qty: 0 | Refills: 0 | Status: COMPLETED | OUTPATIENT
Start: 2018-12-11 | End: 2018-12-11

## 2018-12-10 RX ORDER — ONDANSETRON 8 MG/1
4 TABLET, FILM COATED ORAL EVERY 6 HOURS
Qty: 0 | Refills: 0 | Status: DISCONTINUED | OUTPATIENT
Start: 2018-12-10 | End: 2018-12-14

## 2018-12-10 RX ORDER — HYDROMORPHONE HYDROCHLORIDE 2 MG/ML
0.5 INJECTION INTRAMUSCULAR; INTRAVENOUS; SUBCUTANEOUS
Qty: 0 | Refills: 0 | Status: DISCONTINUED | OUTPATIENT
Start: 2018-12-10 | End: 2018-12-10

## 2018-12-10 RX ORDER — ONDANSETRON 8 MG/1
4 TABLET, FILM COATED ORAL ONCE
Qty: 0 | Refills: 0 | Status: DISCONTINUED | OUTPATIENT
Start: 2018-12-10 | End: 2018-12-10

## 2018-12-10 RX ORDER — NALOXONE HYDROCHLORIDE 4 MG/.1ML
0.1 SPRAY NASAL
Qty: 0 | Refills: 0 | Status: DISCONTINUED | OUTPATIENT
Start: 2018-12-10 | End: 2018-12-14

## 2018-12-10 RX ORDER — ONDANSETRON 8 MG/1
4 TABLET, FILM COATED ORAL EVERY 6 HOURS
Qty: 0 | Refills: 0 | Status: DISCONTINUED | OUTPATIENT
Start: 2018-12-10 | End: 2018-12-10

## 2018-12-10 RX ORDER — ACETAMINOPHEN 500 MG
1000 TABLET ORAL ONCE
Qty: 0 | Refills: 0 | Status: COMPLETED | OUTPATIENT
Start: 2018-12-10 | End: 2018-12-10

## 2018-12-10 RX ORDER — HEPARIN SODIUM 5000 [USP'U]/ML
5000 INJECTION INTRAVENOUS; SUBCUTANEOUS ONCE
Qty: 0 | Refills: 0 | Status: COMPLETED | OUTPATIENT
Start: 2018-12-10 | End: 2018-12-10

## 2018-12-10 RX ORDER — LISINOPRIL 2.5 MG/1
20 TABLET ORAL DAILY
Qty: 0 | Refills: 0 | Status: DISCONTINUED | OUTPATIENT
Start: 2018-12-10 | End: 2018-12-14

## 2018-12-10 RX ORDER — HYDROMORPHONE HYDROCHLORIDE 2 MG/ML
30 INJECTION INTRAMUSCULAR; INTRAVENOUS; SUBCUTANEOUS
Qty: 0 | Refills: 0 | Status: DISCONTINUED | OUTPATIENT
Start: 2018-12-10 | End: 2018-12-12

## 2018-12-10 RX ORDER — HYDROMORPHONE HYDROCHLORIDE 2 MG/ML
0.5 INJECTION INTRAMUSCULAR; INTRAVENOUS; SUBCUTANEOUS
Qty: 0 | Refills: 0 | Status: DISCONTINUED | OUTPATIENT
Start: 2018-12-10 | End: 2018-12-12

## 2018-12-10 RX ORDER — ALVIMOPAN 12 MG/1
12 CAPSULE ORAL ONCE
Qty: 0 | Refills: 0 | Status: COMPLETED | OUTPATIENT
Start: 2018-12-10 | End: 2018-12-10

## 2018-12-10 RX ADMIN — SODIUM CHLORIDE 125 MILLILITER(S): 9 INJECTION, SOLUTION INTRAVENOUS at 13:19

## 2018-12-10 RX ADMIN — Medication 400 MILLIGRAM(S): at 20:09

## 2018-12-10 RX ADMIN — HYDROMORPHONE HYDROCHLORIDE 30 MILLILITER(S): 2 INJECTION INTRAMUSCULAR; INTRAVENOUS; SUBCUTANEOUS at 13:34

## 2018-12-10 RX ADMIN — Medication 30 MILLIGRAM(S): at 17:23

## 2018-12-10 RX ADMIN — ALVIMOPAN 12 MILLIGRAM(S): 12 CAPSULE ORAL at 08:37

## 2018-12-10 RX ADMIN — SODIUM CHLORIDE 150 MILLILITER(S): 9 INJECTION, SOLUTION INTRAVENOUS at 15:38

## 2018-12-10 RX ADMIN — HEPARIN SODIUM 5000 UNIT(S): 5000 INJECTION INTRAVENOUS; SUBCUTANEOUS at 08:37

## 2018-12-10 NOTE — CONSULT NOTE ADULT - SUBJECTIVE AND OBJECTIVE BOX
Chart and meds reviewed.  Patient seen and examined.    HPI: 43 year old Man with pmhx HTN, obesity, SHANNAN on CPAP, rectal cancer dx 8/18, sarcoma, s/p chemo and radiation presented for and underwent ileostomy. Medicine consulted for post-op medical mgmt.      REVIEW OF SYSTEMS  General: denies fever, chills  Skin/Breast: no changes  Ophthalmologic: no vision changes  ENMT:  Tribal, no changes from baseline  Respiratory and Thorax: no cough  Cardiovascular: denies CP/palpitations  Gastrointestinal:  no abd pain  Genitourinary: no issues  Musculoskeletal: no muscle tenderness, no joint swelling or tenderness  Neurological: normal  Psychiatric: denies S/H ideation, no depression/anxiety	  Hematology/Lymphatics: normal, no LN palpable	  Endocrine: normal  Allergic/Immunologic:	    Social HX   lives with wife and 2 kids, ages 5 & 7   used tobacco socially during college years, no since  ETOH/beers 1-2 monthly /special occasions  denies illicit/prescription drug abuse     Fam HX  no 1st deg relative of cancer; maternal 2nd cousins with colon ca  no fam hx CVD        REVIEW OF SYSTEM: ROS comprehensively negative except as above    SUBJECTIVE: mouth feels "very dry" otherwise feels "better now" pain wise (he's >8 hrs post-op), denies CP/palpitation/SOB/HA/dizziness/n/v/f/c; no flatus; denies abd pain (has PCA)      MEDICATIONS  (STANDING):  alvimopan 12 milliGRAM(s) Oral two times a day  HYDROmorphone PCA (1 mG/mL) 30 milliLiter(s) PCA Continuous PCA Continuous  ketorolac   Injectable 30 milliGRAM(s) IV Push every 6 hours  lactated ringers. 1000 milliLiter(s) (150 mL/Hr) IV Continuous <Continuous>  lisinopril 20 milliGRAM(s) Oral daily    MEDICATIONS  (PRN):  acetaminophen   Tablet .. 650 milliGRAM(s) Oral every 6 hours PRN Temp greater or equal to 38C (100.4F), Mild Pain (1 - 3)  HYDROmorphone PCA (1 mG/mL) Rescue Clinician Bolus 0.5 milliGRAM(s) IV Push every 15 minutes PRN for Pain Scale GREATER THAN 6  naloxone Injectable 0.1 milliGRAM(s) IV Push every 3 minutes PRN For ANY of the following changes in patient status:  A. RR LESS THAN 10 breaths per minute, B. Oxygen saturation LESS THAN 90%, C. Sedation score of 6  ondansetron Injectable 4 milliGRAM(s) IV Push every 6 hours PRN Nausea      VITALS:  Vital Signs Last 24 Hrs  T(C): 36.7 (10 Dec 2018 20:54), Max: 37.1 (10 Dec 2018 08:52)  T(F): 98 (10 Dec 2018 20:54), Max: 98.7 (10 Dec 2018 08:52)  HR: 86 (10 Dec 2018 20:54) (78 - 86)  BP: 122/63 (10 Dec 2018 20:54) (93/66 - 145/96)  BP(mean): --  RR: 18 (10 Dec 2018 20:54) (12 - 21)  SpO2: 96% (10 Dec 2018 20:54) (95% - 98%)        PHYSICAL EXAM:    GENERAL: obese, supine in bed, NAD    HEENT:  pupils equal and reactive, EOMI, no oropharyngeal lesions, erythema, exudates, oral thrush    NECK:   supple, no carotid bruits, no palpable lymph nodes, no thyromegaly    CV:  S1S2, RRR, no murmurs or rubs    RESP:   lungs clear to auscultation bilaterally, no wheezing, rales, rhonchi, good air entry bilaterally    BREAST:  not examined    GI: obese abd, hypogastric surgical dsg with suction tubing, ileostomy stoma healthy, small amt of dark bloody secretion is collecting bag, abd soft, generalized tenderness proximal to procedural sites, non-distended, decrease BS, no palpable masses,     RECTAL:  not examined    :  resendiz patent with small amt clear yellow urine    MSK:  decrease muscle tissue to LLE (chronic) otherwise normal muscle tone, no atrophy, no rigidity, no contractions    EXT: LLE with chronic non pitting edema,  no clubbing, no cyanosis, no calf pain, or erythema    VASCULAR:  pulses equal and symmetric in the upper and lower extremities    NEURO:  AAOX3, no focal neurological deficits, follows all commands, able to move extremities spontaneously    SKIN:  no ulcers, lesions or rashes    LABS:  no recent labs Chart and meds reviewed.  Patient seen and examined.    HPI: 43 year old Man with pmhx HTN, obesity, SHANNAN on CPAP, rectal cancer dx 8/18, sarcoma, s/p chemo and radiation presented for and underwent ileostomy. Medicine consulted for post-op medical mgmt.      REVIEW OF SYSTEMS  General: denies fever, chills  Skin/Breast: no changes  Ophthalmologic: no vision changes  ENMT:  [place], no changes from baseline  Respiratory and Thorax: no cough  Cardiovascular: denies CP/palpitations  Gastrointestinal:  no abd pain  Genitourinary: no issues  Musculoskeletal: no muscle tenderness, no joint swelling or tenderness  Neurological: normal  Psychiatric: denies S/H ideation, no depression/anxiety	  Hematology/Lymphatics: normal, no LN palpable	  Endocrine: normal  Allergic/Immunologic:	    Social HX   lives with wife and 2 kids, ages 5 & 7   used tobacco socially during college years, no since  ETOH/beers 1-2 monthly /special occasions  denies illicit/prescription drug abuse     Fam HX  no 1st deg relative of cancer; maternal 2nd cousins with colon ca  no fam hx CVD        REVIEW OF SYSTEM: ROS comprehensively negative except as above    SUBJECTIVE: mouth feels "very dry" otherwise feels "better now" pain wise (he's >8 hrs post-op), denies CP/palpitation/SOB/HA/dizziness/n/v/f/c; no flatus; denies abd pain (has PCA)      MEDICATIONS  (STANDING):  alvimopan 12 milliGRAM(s) Oral two times a day  HYDROmorphone PCA (1 mG/mL) 30 milliLiter(s) PCA Continuous PCA Continuous  ketorolac   Injectable 30 milliGRAM(s) IV Push every 6 hours  lactated ringers. 1000 milliLiter(s) (150 mL/Hr) IV Continuous <Continuous>  lisinopril 20 milliGRAM(s) Oral daily    MEDICATIONS  (PRN):  acetaminophen   Tablet .. 650 milliGRAM(s) Oral every 6 hours PRN Temp greater or equal to 38C (100.4F), Mild Pain (1 - 3)  HYDROmorphone PCA (1 mG/mL) Rescue Clinician Bolus 0.5 milliGRAM(s) IV Push every 15 minutes PRN for Pain Scale GREATER THAN 6  naloxone Injectable 0.1 milliGRAM(s) IV Push every 3 minutes PRN For ANY of the following changes in patient status:  A. RR LESS THAN 10 breaths per minute, B. Oxygen saturation LESS THAN 90%, C. Sedation score of 6  ondansetron Injectable 4 milliGRAM(s) IV Push every 6 hours PRN Nausea      VITALS:  Vital Signs Last 24 Hrs  T(C): 36.7 (10 Dec 2018 20:54), Max: 37.1 (10 Dec 2018 08:52)  T(F): 98 (10 Dec 2018 20:54), Max: 98.7 (10 Dec 2018 08:52)  HR: 86 (10 Dec 2018 20:54) (78 - 86)  BP: 122/63 (10 Dec 2018 20:54) (93/66 - 145/96)    RR: 18 (10 Dec 2018 20:54) (12 - 21)  SpO2: 96% (10 Dec 2018 20:54) (95% - 98%)        PHYSICAL EXAM:    GENERAL: obese, supine in bed, NAD    HEENT:  pupils equal and reactive, EOMI, no oropharyngeal lesions, erythema, exudates, oral thrush    NECK:   supple, no carotid bruits, no palpable lymph nodes, no thyromegaly    CV:  S1S2, RRR, no murmurs or rubs    RESP:   lungs clear to auscultation bilaterally, no wheezing, rales, rhonchi, good air entry bilaterally    BREAST:  not examined    GI: obese abd, hypogastric surgical dsg with suction tubing, ileostomy stoma healthy, small amt of dark bloody secretion is collecting bag, abd soft, generalized tenderness proximal to procedural sites, non-distended, decrease BS, no palpable masses,     RECTAL:  not examined    :  resendiz patent with small amt clear yellow urine    MSK:  decrease muscle tissue to LLE (chronic) otherwise normal muscle tone, no atrophy, no rigidity, no contractions    EXT: LLE with chronic non pitting edema,  no clubbing, no cyanosis, no calf pain, or erythema    VASCULAR:  pulses equal and symmetric in the upper and lower extremities    NEURO:  AAOX3, no focal neurological deficits, follows all commands, able to move extremities spontaneously    SKIN:  no ulcers, lesions or rashes    LABS:  no recent labs

## 2018-12-10 NOTE — CONSULT NOTE ADULT - ASSESSMENT
Rectal cancer   s/p Loop ileostomy /resection, rectum, low anterior, laparoscopic-assisted  post-op surgical care per primary team  con't NPO as per primary team  prophylactic abt /pain mgmt per primary team  con't alvimopan  Ostomy nurse consult ordered    Essential hypertension    BP control  resume home dose ace-i /hctz (20/25) when tolerating po  monitor BP per post-op protocol    Morbid obesity  Obstructive sleep apnea  CPAP HS (has his own)  healthy wt loss program recommended (reported he has lost 50 lbs since dx with rectal Ca)  nutrition consult for education    Sarcoma    Elective surgery  muscle removed from right foot due in 2005    DVT PPX  risk for bleed post-op  venodyne Rectal cancer   s/p Loop ileostomy /resection, rectum, low anterior, laparoscopic-assisted  post-op surgical care per primary team  con't NPO as per primary team  prophylactic abt /pain mgmt per primary team  con't alvimopan  Ostomy nurse consult ordered  am labs  con't IVF fluids/hydration    Essential hypertension    BP control  resume home dose ace-i /hctz (20/25) when tolerating po  monitor BP per post-op protocol    Morbid obesity  Obstructive sleep apnea  CPAP HS (has his own)  healthy wt loss program recommended (reported he has lost 50 lbs since dx with rectal Ca)  nutrition consult for education    Sarcoma    Elective surgery  muscle removed from right foot due in 2005    DVT PPX  risk for bleed post-op  venodyne for now  SQ heparin when/if ok by primary team

## 2018-12-10 NOTE — CONSULT NOTE ADULT - ATTENDING COMMENTS
Pt seen and examined by myself.  Case presentation,  diagnostic data and assessment and plan reviewed in detail with NP Niyah Ojeda.    Agree with plan of care as above.     Pt is a 43 year old gentleman with pmhx HTN, obesity, SHANNAN on CPAP, rectal cancer dx 8/18, sarcoma, s/p chemo and radiation who is post -op ileostomy, s/p Loop ileostomy /resection, rectum, low anterior, laparoscopic-assisted.     POCT  (12.11.18 @ 00:10) POCT Blood Glucose.: 110 mg/dL  POCT  (12.10.18 @ 18:22)  POCT Blood Glucose.: 137 mg/dL    - post-op care  - adjust antihypertensives as needed  - Nutrition consult  - Lifestyle modifications Pt seen and examined by myself.  Case presentation,  diagnostic data and assessment and plan reviewed in detail with NP Niyah Ojeda.    Agree with plan of care as above.     Pt is a 43 year old gentleman with pmhx HTN, obesity, SHANNAN on CPAP, rectal cancer dx 8/18, sarcoma, s/p chemo and radiation who is post -op ileostomy, s/p Loop ileostomy /resection, rectum, low anterior, laparoscopic-assisted.     POCT  (12.11.18 @ 00:10) POCT Blood Glucose.: 110 mg/dL  POCT  (12.10.18 @ 18:22)  POCT Blood Glucose.: 137 mg/dL    - post-op care  - adjust antihypertensives as needed  - Nutrition consult  - Lifestyle modifications  - follow up w/PCP Dr. Ly Calixto and cardiology Dr. Kelsie Dorman at [place]

## 2018-12-10 NOTE — BRIEF OPERATIVE NOTE - PROCEDURE
<<-----Click on this checkbox to enter Procedure Loop ileostomy  12/10/2018    Active  RICARDO  Resection, rectum, low anterior, laparoscopic-assisted  12/10/2018    Active  RICARDO

## 2018-12-11 LAB
ANION GAP SERPL CALC-SCNC: 8 MMOL/L — SIGNIFICANT CHANGE UP (ref 5–17)
BASOPHILS # BLD AUTO: 0 K/UL — SIGNIFICANT CHANGE UP (ref 0–0.2)
BASOPHILS NFR BLD AUTO: 0 % — SIGNIFICANT CHANGE UP (ref 0–2)
BUN SERPL-MCNC: 11 MG/DL — SIGNIFICANT CHANGE UP (ref 7–23)
CALCIUM SERPL-MCNC: 7.8 MG/DL — LOW (ref 8.5–10.1)
CHLORIDE SERPL-SCNC: 107 MMOL/L — SIGNIFICANT CHANGE UP (ref 96–108)
CO2 SERPL-SCNC: 25 MMOL/L — SIGNIFICANT CHANGE UP (ref 22–31)
CREAT SERPL-MCNC: 0.95 MG/DL — SIGNIFICANT CHANGE UP (ref 0.5–1.3)
EOSINOPHIL # BLD AUTO: 0 K/UL — SIGNIFICANT CHANGE UP (ref 0–0.5)
EOSINOPHIL NFR BLD AUTO: 0 % — SIGNIFICANT CHANGE UP (ref 0–6)
GLUCOSE BLDC GLUCOMTR-MCNC: 103 MG/DL — HIGH (ref 70–99)
GLUCOSE BLDC GLUCOMTR-MCNC: 108 MG/DL — HIGH (ref 70–99)
GLUCOSE BLDC GLUCOMTR-MCNC: 110 MG/DL — HIGH (ref 70–99)
GLUCOSE BLDC GLUCOMTR-MCNC: 113 MG/DL — HIGH (ref 70–99)
GLUCOSE SERPL-MCNC: 120 MG/DL — HIGH (ref 70–99)
HCT VFR BLD CALC: 36.4 % — LOW (ref 39–50)
HGB BLD-MCNC: 12 G/DL — LOW (ref 13–17)
LYMPHOCYTES # BLD AUTO: 0.12 K/UL — LOW (ref 1–3.3)
LYMPHOCYTES # BLD AUTO: 2 % — LOW (ref 13–44)
MAGNESIUM SERPL-MCNC: 1.8 MG/DL — SIGNIFICANT CHANGE UP (ref 1.6–2.6)
MCHC RBC-ENTMCNC: 28 PG — SIGNIFICANT CHANGE UP (ref 27–34)
MCHC RBC-ENTMCNC: 33 GM/DL — SIGNIFICANT CHANGE UP (ref 32–36)
MCV RBC AUTO: 85 FL — SIGNIFICANT CHANGE UP (ref 80–100)
MONOCYTES # BLD AUTO: 0.58 K/UL — SIGNIFICANT CHANGE UP (ref 0–0.9)
MONOCYTES NFR BLD AUTO: 10 % — SIGNIFICANT CHANGE UP (ref 2–14)
NEUTROPHILS # BLD AUTO: 5.09 K/UL — SIGNIFICANT CHANGE UP (ref 1.8–7.4)
NEUTROPHILS NFR BLD AUTO: 87 % — HIGH (ref 43–77)
NEUTS BAND # BLD: 1 % — SIGNIFICANT CHANGE UP (ref 0–8)
NRBC # BLD: 0 /100 — SIGNIFICANT CHANGE UP (ref 0–0)
NRBC # BLD: SIGNIFICANT CHANGE UP /100 WBCS (ref 0–0)
PHOSPHATE SERPL-MCNC: 2.9 MG/DL — SIGNIFICANT CHANGE UP (ref 2.5–4.5)
PLAT MORPH BLD: NORMAL — SIGNIFICANT CHANGE UP
PLATELET # BLD AUTO: 142 K/UL — LOW (ref 150–400)
POTASSIUM SERPL-MCNC: 3.6 MMOL/L — SIGNIFICANT CHANGE UP (ref 3.5–5.3)
POTASSIUM SERPL-SCNC: 3.6 MMOL/L — SIGNIFICANT CHANGE UP (ref 3.5–5.3)
RBC # BLD: 4.28 M/UL — SIGNIFICANT CHANGE UP (ref 4.2–5.8)
RBC # FLD: 14.8 % — HIGH (ref 10.3–14.5)
RBC BLD AUTO: NORMAL — SIGNIFICANT CHANGE UP
SODIUM SERPL-SCNC: 140 MMOL/L — SIGNIFICANT CHANGE UP (ref 135–145)
WBC # BLD: 5.78 K/UL — SIGNIFICANT CHANGE UP (ref 3.8–10.5)
WBC # FLD AUTO: 5.78 K/UL — SIGNIFICANT CHANGE UP (ref 3.8–10.5)

## 2018-12-11 RX ADMIN — Medication 30 MILLIGRAM(S): at 05:14

## 2018-12-11 RX ADMIN — Medication 30 MILLIGRAM(S): at 11:19

## 2018-12-11 RX ADMIN — SODIUM CHLORIDE 100 MILLILITER(S): 9 INJECTION, SOLUTION INTRAVENOUS at 21:17

## 2018-12-11 RX ADMIN — LISINOPRIL 20 MILLIGRAM(S): 2.5 TABLET ORAL at 05:15

## 2018-12-11 RX ADMIN — ALVIMOPAN 12 MILLIGRAM(S): 12 CAPSULE ORAL at 05:15

## 2018-12-11 RX ADMIN — Medication 30 MILLIGRAM(S): at 11:34

## 2018-12-11 RX ADMIN — Medication 1000 MILLIGRAM(S): at 00:20

## 2018-12-11 RX ADMIN — HEPARIN SODIUM 5000 UNIT(S): 5000 INJECTION INTRAVENOUS; SUBCUTANEOUS at 05:14

## 2018-12-11 RX ADMIN — HEPARIN SODIUM 5000 UNIT(S): 5000 INJECTION INTRAVENOUS; SUBCUTANEOUS at 21:44

## 2018-12-11 RX ADMIN — ALVIMOPAN 12 MILLIGRAM(S): 12 CAPSULE ORAL at 17:34

## 2018-12-11 RX ADMIN — Medication 30 MILLIGRAM(S): at 00:10

## 2018-12-11 RX ADMIN — Medication 100 GRAM(S): at 00:10

## 2018-12-11 RX ADMIN — Medication 30 MILLIGRAM(S): at 18:00

## 2018-12-11 RX ADMIN — SODIUM CHLORIDE 100 MILLILITER(S): 9 INJECTION, SOLUTION INTRAVENOUS at 11:21

## 2018-12-11 RX ADMIN — Medication 30 MILLIGRAM(S): at 06:13

## 2018-12-11 RX ADMIN — HEPARIN SODIUM 5000 UNIT(S): 5000 INJECTION INTRAVENOUS; SUBCUTANEOUS at 13:24

## 2018-12-11 RX ADMIN — Medication 30 MILLIGRAM(S): at 17:34

## 2018-12-11 RX ADMIN — Medication 30 MILLIGRAM(S): at 00:43

## 2018-12-11 RX ADMIN — SODIUM CHLORIDE 150 MILLILITER(S): 9 INJECTION, SOLUTION INTRAVENOUS at 05:13

## 2018-12-11 NOTE — DIETITIAN INITIAL EVALUATION ADULT. - OTHER INFO
Pt seen for education consult. Pt is 42yo M w/ PMH of Rectal CA (on chemoTx), iron deficiency anemia, b/l hearing loss, Port-A-Cath, morbid obesity, foot drop (Left), sarcoma, Obstructive sleep apnea (on CPAP), herniated disc (Lumbar), mild asthma, seasonal allergies, HTN. PSH: colonoscopy, tonsillectomy, adeniodectomy, myringotomy, and non specified elective surgery. Pt presents s/p Rectal resection & loop ileostomy (12/10). Pt admitted for Rectal CA. Pt seen for education consult. Pt is 42yo M w/ PMH of Rectal CA (on chemoTx), iron deficiency anemia, b/l hearing loss, Port-A-Cath, morbid obesity, foot drop (Left), sarcoma, Obstructive sleep apnea (on CPAP), herniated disc (Lumbar), mild asthma, seasonal allergies, HTN. PSH: colonoscopy, tonsillectomy, adeniodectomy, myringotomy, and non specified elective surgery. Pt presents s/p Rectal resection & loop ileostomy (12/10). Pt admitted for Rectal CA. Upon visit pt reports no N/V/D/C or difficulty chewing/swallowing. Pt has no food allergies and takes no supplements at home. Pt reports a intentional 25.9kg (15.8%) weight loss since August. Pt states this is from a combination of ChemoTx and lifestyle changes. Educated pt on Healthy Lifestyle diet (NCM: General, Healthy Nutrition Therapy). Skin: No Edema present or noted in EMR. Marcelo: 19 w/ no PU PTA. I/O: Last BM noted on 12/10 in pt profile not on bowel regimen. Recommendations: 1) advance to Regular diet when medically feasible. 2) Weekly weights 3) monitor labs/electrolytes Pt seen for education consult. Pt is 42yo M w/ PMH of Rectal CA (on chemoTx), iron deficiency anemia, b/l hearing loss, Port-A-Cath, morbid obesity, foot drop (Left), sarcoma, Obstructive sleep apnea (on CPAP), herniated disc (Lumbar), mild asthma, seasonal allergies, HTN. PSH: colonoscopy, tonsillectomy, adeniodectomy, myringotomy, and non specified elective surgery. Pt presents s/p Rectal resection & loop ileostomy (12/10). Pt admitted for Rectal CA. Upon visit pt reports no N/V/D/C or difficulty chewing/swallowing. Pt has no food allergies and takes no supplements at home. Pt reports a intentional 25.9kg (15.8%) weight loss since August. Educated pt on Healthy Lifestyle diet (NCM: General, Healthy Nutrition Therapy). Skin: No Edema present or noted in EMR. Marcelo: 19 w/ no PU PTA. I/O: Last BM noted on 12/10 in pt profile, not on bowel regimen. Recommendations: 1) advance to Regular diet when medically feasible. 2) Weekly weights 3) monitor labs/electrolytes Pt seen for education consult. Pt is 44yo M w/ PMH of Rectal CA (on chemoTx), iron deficiency anemia, b/l hearing loss, Port-A-Cath, morbid obesity, foot drop (Left), sarcoma, Obstructive sleep apnea (on CPAP), herniated disc (Lumbar), mild asthma, seasonal allergies, HTN. PSH: colonoscopy, tonsillectomy, adeniodectomy, myringotomy, and non specified elective surgery. Pt presents s/p Rectal resection & loop ileostomy (12/10). Pt admitted for Rectal CA. Upon visit pt reports no N/V/D/C or difficulty chewing/swallowing. Pt has no food allergies and takes no supplements at home. Pt reports a intentional 25.9kg (15.8%) weight loss since August due to lifestyle changes. Educated pt on Healthy Lifestyle diet (NCM: General, Healthy Nutrition Therapy). Skin: No Edema present or noted in EMR. Marcelo: 19 w/ no PU PTA. I/O: Last BM noted on 12/10 in pt profile, not on bowel regimen. Recommendations: 1) advance to Regular diet when medically feasible. 2) Weekly weights 3) monitor labs/electrolytes

## 2018-12-11 NOTE — PROGRESS NOTE ADULT - ASSESSMENT
POD 1 s/p laparoscopic LAR with ileostomy. Doing well    clear liquids  Keep resendiz  Continue fluids and PCA  VTE prophylaxis  Ambulate and IS

## 2018-12-11 NOTE — PROGRESS NOTE ADULT - SUBJECTIVE AND OBJECTIVE BOX
Feels well this morning. No nausea or emesis. Pain well controlled    Exam:  Vital Signs Last 24 Hrs  T(C): 37.2 (11 Dec 2018 04:18), Max: 37.7 (11 Dec 2018 00:07)  T(F): 98.9 (11 Dec 2018 04:18), Max: 99.8 (11 Dec 2018 00:07)  HR: 96 (11 Dec 2018 04:18) (78 - 96)  BP: 129/75 (11 Dec 2018 04:18) (93/66 - 133/68)  RR: 18 (11 Dec 2018 04:18) (12 - 21)  SpO2: 96% (11 Dec 2018 04:18) (95% - 98%)      In no distress  Non labored breathing on room air  Soft, non distended. Incision covered with prevena. Ileostomy with bilious output  Alert and oriented x 3                          12.0   5.78  )-----------( 142      ( 11 Dec 2018 06:25 )             36.4   12-11    140  |  107  |  11  ----------------------------<  120<H>  3.6   |  25  |  0.95    Ca    7.8<L>      11 Dec 2018 06:25  Phos  2.9     12-11  Mg     1.8     12-11    MEDICATIONS  (STANDING):  alvimopan 12 milliGRAM(s) Oral two times a day  heparin  Injectable 5000 Unit(s) SubCutaneous every 8 hours  HYDROmorphone PCA (1 mG/mL) 30 milliLiter(s) PCA Continuous PCA Continuous  ketorolac   Injectable 30 milliGRAM(s) IV Push every 6 hours  lactated ringers. 1000 milliLiter(s) (150 mL/Hr) IV Continuous <Continuous>  lisinopril 20 milliGRAM(s) Oral daily

## 2018-12-11 NOTE — ADVANCED PRACTICE NURSE CONSULT - ASSESSMENT
This is a 43  year old male that was admitted for rectal cancer, with surgery on 12/10/2018 and had a loop ileostomy.     In to initiate ostomy education with patient. Family present at the bedside. Educated patient and family on the creation and function of the ostomy, diet, supplies and bathing. Demonstrated how the wafer and pouch work.     Stoma located on the RLQ and is nicely protruded, red, moist and viable. Pouch and wafer intact. No leaking noted. Demonstrated how to empty pouch and 100 ml green liquid stool emptied. Educated patient that the pouch is to be emptied when it 1/3-1/2 full. Patient receptive.    Ostomy education folder reviewed and left at the bedside for patient to review.

## 2018-12-11 NOTE — DIETITIAN INITIAL EVALUATION ADULT. - PERTINENT LABORATORY DATA
12-11 Na140 mmol/L Glu 120 mg/dL<H> K+ 3.6 mmol/L Cr  0.95 mg/dL BUN 11 mg/dL Phos 2.9 mg/dL Alb n/a   PAB n/a

## 2018-12-11 NOTE — ADVANCED PRACTICE NURSE CONSULT - ASSESSMENT
Patient  lying in bed most of the morning with many visitors. Encourage to get OOB walking. Has done walking with family members. Patient did say that his abdomen is slightly bigger than usual. Encouraged to walk more. Is chewing gum. Incentive spirometry done well but does not cough due to abdominal pain. Not taking PCA frequently, encouraged to use prior to coughing.

## 2018-12-11 NOTE — DIETITIAN INITIAL EVALUATION ADULT. - PERTINENT MEDS FT
MEDICATIONS  (STANDING):  alvimopan 12 milliGRAM(s) Oral two times a day  heparin  Injectable 5000 Unit(s) SubCutaneous every 8 hours  HYDROmorphone PCA (1 mG/mL) 30 milliLiter(s) PCA Continuous PCA Continuous  ketorolac   Injectable 30 milliGRAM(s) IV Push every 6 hours  lactated ringers. 1000 milliLiter(s) (150 mL/Hr) IV Continuous <Continuous>  lisinopril 20 milliGRAM(s) Oral daily    MEDICATIONS  (PRN):  acetaminophen   Tablet .. 650 milliGRAM(s) Oral every 6 hours PRN Temp greater or equal to 38C (100.4F), Mild Pain (1 - 3)  HYDROmorphone PCA (1 mG/mL) Rescue Clinician Bolus 0.5 milliGRAM(s) IV Push every 15 minutes PRN for Pain Scale GREATER THAN 6  naloxone Injectable 0.1 milliGRAM(s) IV Push every 3 minutes PRN For ANY of the following changes in patient status:  A. RR LESS THAN 10 breaths per minute, B. Oxygen saturation LESS THAN 90%, C. Sedation score of 6  ondansetron Injectable 4 milliGRAM(s) IV Push every 6 hours PRN Nausea

## 2018-12-11 NOTE — ADVANCED PRACTICE NURSE CONSULT - RECOMMEDATIONS
Will follow to be sure patient is progressing post-operatively. Did discuss neoadjuvant therapy and found that treatment was not difficult and knows will get additional adjuvant therapy once completed post-operative course

## 2018-12-12 DIAGNOSIS — Z08 ENCOUNTER FOR FOLLOW-UP EXAMINATION AFTER COMPLETED TREATMENT FOR MALIGNANT NEOPLASM: ICD-10-CM

## 2018-12-12 DIAGNOSIS — D50.0 IRON DEFICIENCY ANEMIA SECONDARY TO BLOOD LOSS (CHRONIC): ICD-10-CM

## 2018-12-12 DIAGNOSIS — I10 ESSENTIAL (PRIMARY) HYPERTENSION: ICD-10-CM

## 2018-12-12 DIAGNOSIS — E66.9 OBESITY, UNSPECIFIED: ICD-10-CM

## 2018-12-12 DIAGNOSIS — K62.6 ULCER OF ANUS AND RECTUM: ICD-10-CM

## 2018-12-12 DIAGNOSIS — G47.33 OBSTRUCTIVE SLEEP APNEA (ADULT) (PEDIATRIC): ICD-10-CM

## 2018-12-12 DIAGNOSIS — K57.30 DIVERTICULOSIS OF LARGE INTESTINE WITHOUT PERFORATION OR ABSCESS WITHOUT BLEEDING: ICD-10-CM

## 2018-12-12 DIAGNOSIS — M51.26 OTHER INTERVERTEBRAL DISC DISPLACEMENT, LUMBAR REGION: ICD-10-CM

## 2018-12-12 DIAGNOSIS — Z85.048 PERSONAL HISTORY OF OTHER MALIGNANT NEOPLASM OF RECTUM, RECTOSIGMOID JUNCTION, AND ANUS: ICD-10-CM

## 2018-12-12 DIAGNOSIS — Z92.21 PERSONAL HISTORY OF ANTINEOPLASTIC CHEMOTHERAPY: ICD-10-CM

## 2018-12-12 DIAGNOSIS — Z92.3 PERSONAL HISTORY OF IRRADIATION: ICD-10-CM

## 2018-12-12 DIAGNOSIS — Z96.22 MYRINGOTOMY TUBE(S) STATUS: ICD-10-CM

## 2018-12-12 DIAGNOSIS — J45.909 UNSPECIFIED ASTHMA, UNCOMPLICATED: ICD-10-CM

## 2018-12-12 DIAGNOSIS — H91.93 UNSPECIFIED HEARING LOSS, BILATERAL: ICD-10-CM

## 2018-12-12 DIAGNOSIS — Z87.891 PERSONAL HISTORY OF NICOTINE DEPENDENCE: ICD-10-CM

## 2018-12-12 LAB
ANION GAP SERPL CALC-SCNC: 7 MMOL/L — SIGNIFICANT CHANGE UP (ref 5–17)
BASOPHILS # BLD AUTO: 0.01 K/UL — SIGNIFICANT CHANGE UP (ref 0–0.2)
BASOPHILS NFR BLD AUTO: 0.2 % — SIGNIFICANT CHANGE UP (ref 0–2)
BUN SERPL-MCNC: 8 MG/DL — SIGNIFICANT CHANGE UP (ref 7–23)
CALCIUM SERPL-MCNC: 8.1 MG/DL — LOW (ref 8.5–10.1)
CHLORIDE SERPL-SCNC: 106 MMOL/L — SIGNIFICANT CHANGE UP (ref 96–108)
CO2 SERPL-SCNC: 28 MMOL/L — SIGNIFICANT CHANGE UP (ref 22–31)
CREAT SERPL-MCNC: 0.77 MG/DL — SIGNIFICANT CHANGE UP (ref 0.5–1.3)
EOSINOPHIL # BLD AUTO: 0.04 K/UL — SIGNIFICANT CHANGE UP (ref 0–0.5)
EOSINOPHIL NFR BLD AUTO: 0.8 % — SIGNIFICANT CHANGE UP (ref 0–6)
GLUCOSE BLDC GLUCOMTR-MCNC: 111 MG/DL — HIGH (ref 70–99)
GLUCOSE BLDC GLUCOMTR-MCNC: 111 MG/DL — HIGH (ref 70–99)
GLUCOSE BLDC GLUCOMTR-MCNC: 133 MG/DL — HIGH (ref 70–99)
GLUCOSE BLDC GLUCOMTR-MCNC: 91 MG/DL — SIGNIFICANT CHANGE UP (ref 70–99)
GLUCOSE SERPL-MCNC: 109 MG/DL — HIGH (ref 70–99)
HCT VFR BLD CALC: 34.4 % — LOW (ref 39–50)
HGB BLD-MCNC: 11.2 G/DL — LOW (ref 13–17)
IMM GRANULOCYTES NFR BLD AUTO: 0.4 % — SIGNIFICANT CHANGE UP (ref 0–1.5)
LYMPHOCYTES # BLD AUTO: 0.37 K/UL — LOW (ref 1–3.3)
LYMPHOCYTES # BLD AUTO: 7.2 % — LOW (ref 13–44)
MAGNESIUM SERPL-MCNC: 1.8 MG/DL — SIGNIFICANT CHANGE UP (ref 1.6–2.6)
MCHC RBC-ENTMCNC: 28.4 PG — SIGNIFICANT CHANGE UP (ref 27–34)
MCHC RBC-ENTMCNC: 32.6 GM/DL — SIGNIFICANT CHANGE UP (ref 32–36)
MCV RBC AUTO: 87.1 FL — SIGNIFICANT CHANGE UP (ref 80–100)
MONOCYTES # BLD AUTO: 0.53 K/UL — SIGNIFICANT CHANGE UP (ref 0–0.9)
MONOCYTES NFR BLD AUTO: 10.4 % — SIGNIFICANT CHANGE UP (ref 2–14)
NEUTROPHILS # BLD AUTO: 4.15 K/UL — SIGNIFICANT CHANGE UP (ref 1.8–7.4)
NEUTROPHILS NFR BLD AUTO: 81 % — HIGH (ref 43–77)
NRBC # BLD: 0 /100 WBCS — SIGNIFICANT CHANGE UP (ref 0–0)
PHOSPHATE SERPL-MCNC: 2.3 MG/DL — LOW (ref 2.5–4.5)
PLATELET # BLD AUTO: 107 K/UL — LOW (ref 150–400)
POTASSIUM SERPL-MCNC: 3.7 MMOL/L — SIGNIFICANT CHANGE UP (ref 3.5–5.3)
POTASSIUM SERPL-SCNC: 3.7 MMOL/L — SIGNIFICANT CHANGE UP (ref 3.5–5.3)
RBC # BLD: 3.95 M/UL — LOW (ref 4.2–5.8)
RBC # FLD: 14.6 % — HIGH (ref 10.3–14.5)
SODIUM SERPL-SCNC: 141 MMOL/L — SIGNIFICANT CHANGE UP (ref 135–145)
WBC # BLD: 5.12 K/UL — SIGNIFICANT CHANGE UP (ref 3.8–10.5)
WBC # FLD AUTO: 5.12 K/UL — SIGNIFICANT CHANGE UP (ref 3.8–10.5)

## 2018-12-12 RX ORDER — SODIUM,POTASSIUM PHOSPHATES 278-250MG
1 POWDER IN PACKET (EA) ORAL ONCE
Qty: 0 | Refills: 0 | Status: COMPLETED | OUTPATIENT
Start: 2018-12-12 | End: 2018-12-12

## 2018-12-12 RX ORDER — KETOROLAC TROMETHAMINE 30 MG/ML
30 SYRINGE (ML) INJECTION EVERY 6 HOURS
Qty: 0 | Refills: 0 | Status: DISCONTINUED | OUTPATIENT
Start: 2018-12-12 | End: 2018-12-14

## 2018-12-12 RX ORDER — OXYCODONE AND ACETAMINOPHEN 5; 325 MG/1; MG/1
2 TABLET ORAL EVERY 4 HOURS
Qty: 0 | Refills: 0 | Status: DISCONTINUED | OUTPATIENT
Start: 2018-12-12 | End: 2018-12-14

## 2018-12-12 RX ORDER — DEXTROSE MONOHYDRATE, SODIUM CHLORIDE, AND POTASSIUM CHLORIDE 50; .745; 4.5 G/1000ML; G/1000ML; G/1000ML
1000 INJECTION, SOLUTION INTRAVENOUS
Qty: 0 | Refills: 0 | Status: DISCONTINUED | OUTPATIENT
Start: 2018-12-12 | End: 2018-12-14

## 2018-12-12 RX ORDER — OXYCODONE AND ACETAMINOPHEN 5; 325 MG/1; MG/1
1 TABLET ORAL EVERY 4 HOURS
Qty: 0 | Refills: 0 | Status: DISCONTINUED | OUTPATIENT
Start: 2018-12-12 | End: 2018-12-14

## 2018-12-12 RX ADMIN — HEPARIN SODIUM 5000 UNIT(S): 5000 INJECTION INTRAVENOUS; SUBCUTANEOUS at 21:33

## 2018-12-12 RX ADMIN — Medication 30 MILLIGRAM(S): at 00:30

## 2018-12-12 RX ADMIN — LISINOPRIL 20 MILLIGRAM(S): 2.5 TABLET ORAL at 05:34

## 2018-12-12 RX ADMIN — Medication 30 MILLIGRAM(S): at 11:27

## 2018-12-12 RX ADMIN — ALVIMOPAN 12 MILLIGRAM(S): 12 CAPSULE ORAL at 05:34

## 2018-12-12 RX ADMIN — Medication 30 MILLIGRAM(S): at 18:17

## 2018-12-12 RX ADMIN — Medication 1 PACKET(S): at 11:27

## 2018-12-12 RX ADMIN — Medication 30 MILLIGRAM(S): at 05:34

## 2018-12-12 RX ADMIN — Medication 30 MILLIGRAM(S): at 11:42

## 2018-12-12 RX ADMIN — Medication 30 MILLIGRAM(S): at 00:14

## 2018-12-12 RX ADMIN — SODIUM CHLORIDE 100 MILLILITER(S): 9 INJECTION, SOLUTION INTRAVENOUS at 05:38

## 2018-12-12 RX ADMIN — DEXTROSE MONOHYDRATE, SODIUM CHLORIDE, AND POTASSIUM CHLORIDE 50 MILLILITER(S): 50; .745; 4.5 INJECTION, SOLUTION INTRAVENOUS at 12:19

## 2018-12-12 RX ADMIN — HEPARIN SODIUM 5000 UNIT(S): 5000 INJECTION INTRAVENOUS; SUBCUTANEOUS at 13:25

## 2018-12-12 RX ADMIN — Medication 30 MILLIGRAM(S): at 18:32

## 2018-12-12 RX ADMIN — HEPARIN SODIUM 5000 UNIT(S): 5000 INJECTION INTRAVENOUS; SUBCUTANEOUS at 05:34

## 2018-12-12 NOTE — PROGRESS NOTE ADULT - ASSESSMENT
s/p lar with diverting ileostomy for rectal cancer  doing well  hypophophatemia    full liquids  decrease fluids  dc resendiz  dc pca and change to po pain meds  oob  cont ostomy teaching  replace phos   dvt prophylaxis

## 2018-12-12 NOTE — PROGRESS NOTE ADULT - SUBJECTIVE AND OBJECTIVE BOX
POD 2, no n/v  ileostomy working  pain controlled    Vital Signs Last 24 Hrs  T(C): 37.2 (12 Dec 2018 04:23), Max: 38 (12 Dec 2018 00:00)  T(F): 98.9 (12 Dec 2018 04:23), Max: 100.4 (12 Dec 2018 00:00)  HR: 90 (12 Dec 2018 04:23) (90 - 100)  BP: 111/65 (12 Dec 2018 04:23) (111/65 - 130/65)  BP(mean): --  RR: 18 (11 Dec 2018 20:43) (18 - 18)  SpO2: 95% (12 Dec 2018 04:23) (95% - 100%)    NAD  abd soft   Prevena vac in place  ileostomy working 1000ml recorded in 24 hrs  resendiz 1300ml                          11.2   5.12  )-----------( 107      ( 12 Dec 2018 05:38 )             34.4   12-12    141  |  106  |  8   ----------------------------<  109<H>  3.7   |  28  |  0.77    Ca    8.1<L>      12 Dec 2018 05:38  Phos  2.3     12-12  Mg     1.8     12-12

## 2018-12-13 LAB
ANION GAP SERPL CALC-SCNC: 7 MMOL/L — SIGNIFICANT CHANGE UP (ref 5–17)
BASOPHILS # BLD AUTO: 0.02 K/UL — SIGNIFICANT CHANGE UP (ref 0–0.2)
BASOPHILS NFR BLD AUTO: 0.5 % — SIGNIFICANT CHANGE UP (ref 0–2)
BUN SERPL-MCNC: 11 MG/DL — SIGNIFICANT CHANGE UP (ref 7–23)
CALCIUM SERPL-MCNC: 8.4 MG/DL — LOW (ref 8.5–10.1)
CHLORIDE SERPL-SCNC: 111 MMOL/L — HIGH (ref 96–108)
CO2 SERPL-SCNC: 26 MMOL/L — SIGNIFICANT CHANGE UP (ref 22–31)
CREAT SERPL-MCNC: 0.83 MG/DL — SIGNIFICANT CHANGE UP (ref 0.5–1.3)
EOSINOPHIL # BLD AUTO: 0.07 K/UL — SIGNIFICANT CHANGE UP (ref 0–0.5)
EOSINOPHIL NFR BLD AUTO: 1.7 % — SIGNIFICANT CHANGE UP (ref 0–6)
GLUCOSE SERPL-MCNC: 110 MG/DL — HIGH (ref 70–99)
HCT VFR BLD CALC: 33.4 % — LOW (ref 39–50)
HGB BLD-MCNC: 10.8 G/DL — LOW (ref 13–17)
IMM GRANULOCYTES NFR BLD AUTO: 0.2 % — SIGNIFICANT CHANGE UP (ref 0–1.5)
LYMPHOCYTES # BLD AUTO: 0.37 K/UL — LOW (ref 1–3.3)
LYMPHOCYTES # BLD AUTO: 9.2 % — LOW (ref 13–44)
MAGNESIUM SERPL-MCNC: 1.9 MG/DL — SIGNIFICANT CHANGE UP (ref 1.6–2.6)
MCHC RBC-ENTMCNC: 28.5 PG — SIGNIFICANT CHANGE UP (ref 27–34)
MCHC RBC-ENTMCNC: 32.3 GM/DL — SIGNIFICANT CHANGE UP (ref 32–36)
MCV RBC AUTO: 88.1 FL — SIGNIFICANT CHANGE UP (ref 80–100)
MONOCYTES # BLD AUTO: 0.35 K/UL — SIGNIFICANT CHANGE UP (ref 0–0.9)
MONOCYTES NFR BLD AUTO: 8.7 % — SIGNIFICANT CHANGE UP (ref 2–14)
NEUTROPHILS # BLD AUTO: 3.21 K/UL — SIGNIFICANT CHANGE UP (ref 1.8–7.4)
NEUTROPHILS NFR BLD AUTO: 79.7 % — HIGH (ref 43–77)
NRBC # BLD: 0 /100 WBCS — SIGNIFICANT CHANGE UP (ref 0–0)
PHOSPHATE SERPL-MCNC: 2.9 MG/DL — SIGNIFICANT CHANGE UP (ref 2.5–4.5)
PLATELET # BLD AUTO: 122 K/UL — LOW (ref 150–400)
POTASSIUM SERPL-MCNC: 4.2 MMOL/L — SIGNIFICANT CHANGE UP (ref 3.5–5.3)
POTASSIUM SERPL-SCNC: 4.2 MMOL/L — SIGNIFICANT CHANGE UP (ref 3.5–5.3)
RBC # BLD: 3.79 M/UL — LOW (ref 4.2–5.8)
RBC # FLD: 14.4 % — SIGNIFICANT CHANGE UP (ref 10.3–14.5)
SODIUM SERPL-SCNC: 144 MMOL/L — SIGNIFICANT CHANGE UP (ref 135–145)
WBC # BLD: 4.03 K/UL — SIGNIFICANT CHANGE UP (ref 3.8–10.5)
WBC # FLD AUTO: 4.03 K/UL — SIGNIFICANT CHANGE UP (ref 3.8–10.5)

## 2018-12-13 RX ORDER — LANOLIN ALCOHOL/MO/W.PET/CERES
3 CREAM (GRAM) TOPICAL ONCE
Qty: 0 | Refills: 0 | Status: COMPLETED | OUTPATIENT
Start: 2018-12-13 | End: 2018-12-13

## 2018-12-13 RX ORDER — LOPERAMIDE HCL 2 MG
2 TABLET ORAL
Qty: 0 | Refills: 0 | Status: DISCONTINUED | OUTPATIENT
Start: 2018-12-13 | End: 2018-12-14

## 2018-12-13 RX ADMIN — LISINOPRIL 20 MILLIGRAM(S): 2.5 TABLET ORAL at 11:49

## 2018-12-13 RX ADMIN — Medication 30 MILLIGRAM(S): at 00:10

## 2018-12-13 RX ADMIN — Medication 30 MILLIGRAM(S): at 05:45

## 2018-12-13 RX ADMIN — DEXTROSE MONOHYDRATE, SODIUM CHLORIDE, AND POTASSIUM CHLORIDE 50 MILLILITER(S): 50; .745; 4.5 INJECTION, SOLUTION INTRAVENOUS at 05:36

## 2018-12-13 RX ADMIN — Medication 30 MILLIGRAM(S): at 01:24

## 2018-12-13 RX ADMIN — Medication 30 MILLIGRAM(S): at 05:34

## 2018-12-13 RX ADMIN — Medication 2 MILLIGRAM(S): at 11:45

## 2018-12-13 RX ADMIN — HEPARIN SODIUM 5000 UNIT(S): 5000 INJECTION INTRAVENOUS; SUBCUTANEOUS at 05:34

## 2018-12-13 RX ADMIN — HEPARIN SODIUM 5000 UNIT(S): 5000 INJECTION INTRAVENOUS; SUBCUTANEOUS at 13:35

## 2018-12-13 RX ADMIN — HEPARIN SODIUM 5000 UNIT(S): 5000 INJECTION INTRAVENOUS; SUBCUTANEOUS at 22:41

## 2018-12-13 RX ADMIN — Medication 3 MILLIGRAM(S): at 22:41

## 2018-12-13 RX ADMIN — Medication 2 MILLIGRAM(S): at 17:27

## 2018-12-13 NOTE — ADVANCED PRACTICE NURSE CONSULT - ASSESSMENT
This is a 43  year old male that was admitted for rectal cancer, with surgery on 12/10/2018 and had a loop ileostomy.     In to continue ostomy education with patient and wife. Reviewed with patient and wife on the creation and function of the ostomy, diet, supplies and bathing.     Stoma located on the RLQ and is nicely protruded, red, moist and viable. Pouch and wafer intact. No leaking noted. Wife reports that she has been assisting patient with emptying pouch. Reviewed the importance of emptying the pouch when it 1/3-1/2 full. Patient and wife receptive.    Wife has also reviewed the ostomy education folder.

## 2018-12-13 NOTE — PROGRESS NOTE ADULT - SUBJECTIVE AND OBJECTIVE BOX
no n/v  ileostomy 1600cc in 24hrs  pain controlled    Vital Signs Last 24 Hrs  T(C): 36.7 (13 Dec 2018 04:35), Max: 36.9 (12 Dec 2018 20:57)  T(F): 98 (13 Dec 2018 04:35), Max: 98.4 (12 Dec 2018 20:57)  HR: 79 (13 Dec 2018 04:35) (78 - 79)  BP: 104/62 (13 Dec 2018 04:35) (104/62 - 120/63)  BP(mean): --  RR: 17 (13 Dec 2018 04:35) (16 - 17)  SpO2: 97% (13 Dec 2018 04:35) (95% - 97%)    NAD  abd soft  prevena in place                          10.8   4.03  )-----------( 122      ( 13 Dec 2018 05:26 )             33.4   12-13    144  |  111<H>  |  11  ----------------------------<  110<H>  4.2   |  26  |  0.83    Ca    8.4<L>      13 Dec 2018 05:26  Phos  2.9     12-13  Mg     1.9     12-13

## 2018-12-13 NOTE — PROGRESS NOTE ADULT - ASSESSMENT
s/p ultra LAR with diverting ileostomy for rectal cancer    low fiber diet  cont IVF due to increased stoma output  add imodium  po pain meds  ambulate  anticipate dc home tomorrow

## 2018-12-13 NOTE — CHART NOTE - NSCHARTNOTEFT_GEN_A_CORE
Assessment:     Assessment 12/11   Recommendations: 1) advance to Regular diet when medically feasible. 2) Weekly weights 3) monitor labs/electrolytes  Diet Prescription: Diet, Full Liquid (12-12-18 @ 10:01)    Wt Hx:  Height (cm): 190.5 (12-10-18 @ 08:12), 190.5 (11-27-18 @ 10:52), 190.5 (11-14-18 @ 13:22)  Weight (kg): 140.6 (12-10-18 @ 08:12), 142.9 (11-27-18 @ 10:52), 140.6 (11-14-18 @ 13:22)  BMI (kg/m2): 38.7 (12-10-18 @ 08:12), 39.4 (11-27-18 @ 10:52), 38.7 (11-14-18 @ 13:22)    Pertinent Medications: MEDICATIONS  (STANDING):  dextrose 5% + sodium chloride 0.9% with potassium chloride 20 mEq/L 1000 milliLiter(s) (50 mL/Hr) IV Continuous <Continuous>  heparin  Injectable 5000 Unit(s) SubCutaneous every 8 hours  ketorolac   Injectable 30 milliGRAM(s) IV Push every 6 hours  lisinopril 20 milliGRAM(s) Oral daily    MEDICATIONS  (PRN):  acetaminophen   Tablet .. 650 milliGRAM(s) Oral every 6 hours PRN Temp greater or equal to 38C (100.4F), Mild Pain (1 - 3)  naloxone Injectable 0.1 milliGRAM(s) IV Push every 3 minutes PRN For ANY of the following changes in patient status:  A. RR LESS THAN 10 breaths per minute, B. Oxygen saturation LESS THAN 90%, C. Sedation score of 6  ondansetron Injectable 4 milliGRAM(s) IV Push every 6 hours PRN Nausea  oxyCODONE    5 mG/acetaminophen 325 mG 1 Tablet(s) Oral every 4 hours PRN Mild Pain (1 - 3)  oxyCODONE    5 mG/acetaminophen 325 mG 2 Tablet(s) Oral every 4 hours PRN Moderate Pain (4 - 6)    Pertinent Labs: 12-13 Na144 mmol/L Glu 110 mg/dL<H> K+ 4.2 mmol/L Cr  0.83 mg/dL BUN 11 mg/dL 12-13 Phos 2.9 mg/dL 11-14 [id number] 5.4 %     CAPILLARY BLOOD GLUCOSE      POCT Blood Glucose.: 111 mg/dL (12 Dec 2018 18:15)  POCT Blood Glucose.: 133 mg/dL (12 Dec 2018 12:42)      Skin: ely score =       Estimated Needs:   [ ] no change since previous assessment        Nutrition Diagnosis is [ ] ongoing  [ ] resolved [ ] not applicable       Previous Goal is [ ] met [ ] partially met [ ] not met [ ] no longer applicable [ ] improving    New Nutrition Diagnosis: [ ] not applicable     Recommendations:      Monitoring and Evaluation:   [x] PO intake/Nutr support infusion [ x ] Tolerance to Nutr [ x ] weights [ x ] labs[ x ] follow up per protocol  [ ] other: Assessment:   Pt tolerating full liquid diet, is hungry.  Will return to pt prior to discharge to discuss  ileostomy diet.  Initially low fiber.  Will continue to monitor.     ?Assessment:   Estimated Energy Needs (calories/kg):  · Weight Used for Calculation  adjusted    Estimated Energy Needs (25-30 calories/kg):  · Weight  (lbs)  213.8 lb  · Weight (kg)  97 kg  · From (25cal/kg)  2425  · To (30cal/kg)  2910    Other Calculation:  · Other Calculation  Ht: 190.5cm    Wt: 140.6Kg    BMI: 38.7   IBW: 81.82Kg    Pt is at: 172.84% IBW    Estimated Protein Needs (1.2-1.4 gm/kg):  · Weight  (lbs)  180.7  · Weight (kg)  82 kg  · From (1.2 g/kg)  98.4  · To (1.4 g/kg)  114.8    Estimated Fluid Needs (25-30 ml/kg):  · Weight  (lbs)  213.8  · Weight (kg)  97  · From (25 ml/kg)  2425  · To (30 ml/kg)  2910      Diet Prescription: Diet, Low Fiber (12-13-18 @ 10:16)      Wt Hx:  Height (cm): 190.5 (12-10-18 @ 08:12), 190.5 (11-27-18 @ 10:52), 190.5 (11-14-18 @ 13:22)  Weight (kg): 140.6 (12-10-18 @ 08:12), 142.9 (11-27-18 @ 10:52), 140.6 (11-14-18 @ 13:22)  BMI (kg/m2): 38.7 (12-10-18 @ 08:12), 39.4 (11-27-18 @ 10:52), 38.7 (11-14-18 @ 13:22)    Pertinent Medications: MEDICATIONS  (STANDING):  dextrose 5% + sodium chloride 0.9% with potassium chloride 20 mEq/L 1000 milliLiter(s) (50 mL/Hr) IV Continuous <Continuous>  heparin  Injectable 5000 Unit(s) SubCutaneous every 8 hours  ketorolac   Injectable 30 milliGRAM(s) IV Push every 6 hours  lisinopril 20 milliGRAM(s) Oral daily  loperamide 2 milliGRAM(s) Oral four times a day    MEDICATIONS  (PRN):  acetaminophen   Tablet .. 650 milliGRAM(s) Oral every 6 hours PRN Temp greater or equal to 38C (100.4F), Mild Pain (1 - 3)  naloxone Injectable 0.1 milliGRAM(s) IV Push every 3 minutes PRN For ANY of the following changes in patient status:  A. RR LESS THAN 10 breaths per minute, B. Oxygen saturation LESS THAN 90%, C. Sedation score of 6  ondansetron Injectable 4 milliGRAM(s) IV Push every 6 hours PRN Nausea  oxyCODONE    5 mG/acetaminophen 325 mG 1 Tablet(s) Oral every 4 hours PRN Mild Pain (1 - 3)  oxyCODONE    5 mG/acetaminophen 325 mG 2 Tablet(s) Oral every 4 hours PRN Moderate Pain (4 - 6)    Pertinent Labs: 12-13 Na144 mmol/L Glu 110 mg/dL<H> K+ 4.2 mmol/L Cr  0.83 mg/dL BUN 11 mg/dL 12-13 Phos 2.9 mg/dL 11-14 [id number] 5.4 %     CAPILLARY BLOOD GLUCOSE      POCT Blood Glucose.: 111 mg/dL (12 Dec 2018 18:15)        Monitoring and Evaluation:   [x] PO intake/Nutr support infusion [ x ] Tolerance to Nutr [ x ] weights [ x ] labs[ x ] follow up per protocol  [ ] other:    Assessment 12/11   Recommendations: 1) advance to Regular diet when medically feasible. 2) Weekly weights 3) monitor labs/electrolytes  Diet Prescription: Diet, Full Liquid (12-12-18 @ 10:01)    Wt Hx:  Height (cm): 190.5 (12-10-18 @ 08:12), 190.5 (11-27-18 @ 10:52), 190.5 (11-14-18 @ 13:22)  Weight (kg): 140.6 (12-10-18 @ 08:12), 142.9 (11-27-18 @ 10:52), 140.6 (11-14-18 @ 13:22)  BMI (kg/m2): 38.7 (12-10-18 @ 08:12), 39.4 (11-27-18 @ 10:52), 38.7 (11-14-18 @ 13:22)    Pertinent Medications: MEDICATIONS  (STANDING):  dextrose 5% + sodium chloride 0.9% with potassium chloride 20 mEq/L 1000 milliLiter(s) (50 mL/Hr) IV Continuous <Continuous>  heparin  Injectable 5000 Unit(s) SubCutaneous every 8 hours  ketorolac   Injectable 30 milliGRAM(s) IV Push every 6 hours  lisinopril 20 milliGRAM(s) Oral daily    MEDICATIONS  (PRN):  acetaminophen   Tablet .. 650 milliGRAM(s) Oral every 6 hours PRN Temp greater or equal to 38C (100.4F), Mild Pain (1 - 3)  naloxone Injectable 0.1 milliGRAM(s) IV Push every 3 minutes PRN For ANY of the following changes in patient status:  A. RR LESS THAN 10 breaths per minute, B. Oxygen saturation LESS THAN 90%, C. Sedation score of 6  ondansetron Injectable 4 milliGRAM(s) IV Push every 6 hours PRN Nausea  oxyCODONE    5 mG/acetaminophen 325 mG 1 Tablet(s) Oral every 4 hours PRN Mild Pain (1 - 3)  oxyCODONE    5 mG/acetaminophen 325 mG 2 Tablet(s) Oral every 4 hours PRN Moderate Pain (4 - 6)    Pertinent Labs: 12-13 Na144 mmol/L Glu 110 mg/dL<H> K+ 4.2 mmol/L Cr  0.83 mg/dL BUN 11 mg/dL 12-13 Phos 2.9 mg/dL 11-14 [id number] 5.4 %     CAPILLARY BLOOD GLUCOSE      POCT Blood Glucose.: 111 mg/dL (12 Dec 2018 18:15)  POCT Blood Glucose.: 133 mg/dL (12 Dec 2018 12:42)          Monitoring and Evaluation:   [x] PO intake/Nutr support infusion [ x ] Tolerance to Nutr [ x ] weights [ x ] labs[ x ] follow up per protocol  [ ] other:

## 2018-12-14 ENCOUNTER — TRANSCRIPTION ENCOUNTER (OUTPATIENT)
Age: 43
End: 2018-12-14

## 2018-12-14 VITALS
TEMPERATURE: 98 F | RESPIRATION RATE: 17 BRPM | HEART RATE: 72 BPM | SYSTOLIC BLOOD PRESSURE: 106 MMHG | DIASTOLIC BLOOD PRESSURE: 64 MMHG | OXYGEN SATURATION: 98 %

## 2018-12-14 LAB
ANION GAP SERPL CALC-SCNC: 8 MMOL/L — SIGNIFICANT CHANGE UP (ref 5–17)
BASOPHILS # BLD AUTO: 0.02 K/UL — SIGNIFICANT CHANGE UP (ref 0–0.2)
BASOPHILS NFR BLD AUTO: 0.5 % — SIGNIFICANT CHANGE UP (ref 0–2)
BUN SERPL-MCNC: 17 MG/DL — SIGNIFICANT CHANGE UP (ref 7–23)
CALCIUM SERPL-MCNC: 8.4 MG/DL — LOW (ref 8.5–10.1)
CHLORIDE SERPL-SCNC: 110 MMOL/L — HIGH (ref 96–108)
CO2 SERPL-SCNC: 25 MMOL/L — SIGNIFICANT CHANGE UP (ref 22–31)
CREAT SERPL-MCNC: 0.86 MG/DL — SIGNIFICANT CHANGE UP (ref 0.5–1.3)
EOSINOPHIL # BLD AUTO: 0.11 K/UL — SIGNIFICANT CHANGE UP (ref 0–0.5)
EOSINOPHIL NFR BLD AUTO: 2.6 % — SIGNIFICANT CHANGE UP (ref 0–6)
GLUCOSE SERPL-MCNC: 100 MG/DL — HIGH (ref 70–99)
HCT VFR BLD CALC: 32.6 % — LOW (ref 39–50)
HGB BLD-MCNC: 10.6 G/DL — LOW (ref 13–17)
IMM GRANULOCYTES NFR BLD AUTO: 0.7 % — SIGNIFICANT CHANGE UP (ref 0–1.5)
LYMPHOCYTES # BLD AUTO: 0.4 K/UL — LOW (ref 1–3.3)
LYMPHOCYTES # BLD AUTO: 9.4 % — LOW (ref 13–44)
MAGNESIUM SERPL-MCNC: 1.8 MG/DL — SIGNIFICANT CHANGE UP (ref 1.6–2.6)
MCHC RBC-ENTMCNC: 28.8 PG — SIGNIFICANT CHANGE UP (ref 27–34)
MCHC RBC-ENTMCNC: 32.5 GM/DL — SIGNIFICANT CHANGE UP (ref 32–36)
MCV RBC AUTO: 88.6 FL — SIGNIFICANT CHANGE UP (ref 80–100)
MONOCYTES # BLD AUTO: 0.35 K/UL — SIGNIFICANT CHANGE UP (ref 0–0.9)
MONOCYTES NFR BLD AUTO: 8.3 % — SIGNIFICANT CHANGE UP (ref 2–14)
NEUTROPHILS # BLD AUTO: 3.33 K/UL — SIGNIFICANT CHANGE UP (ref 1.8–7.4)
NEUTROPHILS NFR BLD AUTO: 78.5 % — HIGH (ref 43–77)
NRBC # BLD: 0 /100 WBCS — SIGNIFICANT CHANGE UP (ref 0–0)
PHOSPHATE SERPL-MCNC: 4 MG/DL — SIGNIFICANT CHANGE UP (ref 2.5–4.5)
PLATELET # BLD AUTO: 127 K/UL — LOW (ref 150–400)
POTASSIUM SERPL-MCNC: 4 MMOL/L — SIGNIFICANT CHANGE UP (ref 3.5–5.3)
POTASSIUM SERPL-SCNC: 4 MMOL/L — SIGNIFICANT CHANGE UP (ref 3.5–5.3)
RBC # BLD: 3.68 M/UL — LOW (ref 4.2–5.8)
RBC # FLD: 13.9 % — SIGNIFICANT CHANGE UP (ref 10.3–14.5)
SODIUM SERPL-SCNC: 143 MMOL/L — SIGNIFICANT CHANGE UP (ref 135–145)
SURGICAL PATHOLOGY FINAL REPORT - CH: SIGNIFICANT CHANGE UP
WBC # BLD: 4.24 K/UL — SIGNIFICANT CHANGE UP (ref 3.8–10.5)
WBC # FLD AUTO: 4.24 K/UL — SIGNIFICANT CHANGE UP (ref 3.8–10.5)

## 2018-12-14 RX ORDER — LOPERAMIDE HCL 2 MG
1 TABLET ORAL
Qty: 0 | Refills: 0 | COMMUNITY
Start: 2018-12-14

## 2018-12-14 RX ADMIN — HEPARIN SODIUM 5000 UNIT(S): 5000 INJECTION INTRAVENOUS; SUBCUTANEOUS at 05:46

## 2018-12-14 RX ADMIN — Medication 30 MILLIGRAM(S): at 00:12

## 2018-12-14 RX ADMIN — Medication 30 MILLIGRAM(S): at 05:46

## 2018-12-14 RX ADMIN — Medication 30 MILLIGRAM(S): at 05:42

## 2018-12-14 RX ADMIN — Medication 30 MILLIGRAM(S): at 06:24

## 2018-12-14 RX ADMIN — DEXTROSE MONOHYDRATE, SODIUM CHLORIDE, AND POTASSIUM CHLORIDE 50 MILLILITER(S): 50; .745; 4.5 INJECTION, SOLUTION INTRAVENOUS at 05:45

## 2018-12-14 RX ADMIN — LISINOPRIL 20 MILLIGRAM(S): 2.5 TABLET ORAL at 13:30

## 2018-12-14 RX ADMIN — Medication 2 MILLIGRAM(S): at 13:26

## 2018-12-14 RX ADMIN — Medication 2 MILLIGRAM(S): at 00:12

## 2018-12-14 NOTE — DISCHARGE NOTE ADULT - PATIENT PORTAL LINK FT
You can access the [facility], offered by [facility], by registering with the following website: [email or web address]

## 2018-12-14 NOTE — DISCHARGE NOTE ADULT - CARE PROVIDER_API CALL
Nehemias Velarde), ColonRectal Surgery; Surgery  [address] [place] [address]  Phone: [phone]  Fax: [phone]

## 2018-12-14 NOTE — DISCHARGE NOTE ADULT - HOSPITAL COURSE
patient underwent a LAR with diverting ileostomy. He did very well postop with ileostomy function and pain control, tolerating diet  will go home with home care for stoma care

## 2018-12-14 NOTE — DISCHARGE NOTE ADULT - MEDICATION SUMMARY - MEDICATIONS TO TAKE
I will START or STAY ON the medications listed below when I get home from the hospital:    loperamide 2 mg oral capsule  -- 1 cap(s) by mouth 4 times a day  -- Indication: For ileostomy    lisinopril-hydroCHLOROthiazide 20 mg-25 mg oral tablet  -- 1 tab(s) by mouth once a day (in the morning)  -- Indication: For home med    Flonase 50 mcg/inh nasal spray  -- 1 spray(s) into nose once a day  -- Indication: For home med I will START or STAY ON the medications listed below when I get home from the hospital:    oxycodone-acetaminophen 5 mg-325 mg oral tablet  -- 1 tab(s) by mouth every 6 hours, As Needed -for moderate pain [id number]  -- Caution federal law prohibits the transfer of this drug to any person other  than the person for whom it was prescribed.  May cause drowsiness.  Alcohol may intensify this effect.  Use care when operating dangerous machinery.  This prescription cannot be refilled.  This product contains acetaminophen.  Do not use  with any other product containing acetaminophen to prevent possible liver damage.  Using more of this medication than prescribed may cause serious breathing problems.    -- Indication: For pain    loperamide 2 mg oral capsule  -- 1 cap(s) by mouth 4 times a day  -- Indication: For ileostomy    lisinopril-hydroCHLOROthiazide 20 mg-25 mg oral tablet  -- 1 tab(s) by mouth once a day (in the morning)  -- Indication: For home med    Flonase 50 mcg/inh nasal spray  -- 1 spray(s) into nose once a day  -- Indication: For home med

## 2018-12-14 NOTE — DISCHARGE NOTE ADULT - NS AS ACTIVITY OBS
Walking-Outdoors allowed/Showering allowed/Driving allowed/Walking-Indoors allowed/No Heavy lifting/straining/Stairs allowed

## 2018-12-14 NOTE — ADVANCED PRACTICE NURSE CONSULT - ASSESSMENT
This is a 43  year old male that was admitted for rectal cancer, with surgery on 12/10/2018 and had a loop ileostomy.     In to change patient's ostomy appliance. Wife present at the bedside.    Old ostomy appliance removed along with stoma fei.  Prevena left in place. Stoma located on the RLQ and is round, moist, viable and nicely protruded. Measures 1 1/2".  2 3/4" wafer used to cut to fit stoma and placed along with barrier ring. Pouch snapped into place. Steps reviewed with patient and wife. Hospital discharge kit given to patient to bring home. Patient enrolled into [employer] program.

## 2018-12-18 DIAGNOSIS — I10 ESSENTIAL (PRIMARY) HYPERTENSION: ICD-10-CM

## 2018-12-18 DIAGNOSIS — Z80.9 FAMILY HISTORY OF MALIGNANT NEOPLASM, UNSPECIFIED: ICD-10-CM

## 2018-12-18 DIAGNOSIS — E66.01 MORBID (SEVERE) OBESITY DUE TO EXCESS CALORIES: ICD-10-CM

## 2018-12-18 DIAGNOSIS — Z87.891 PERSONAL HISTORY OF NICOTINE DEPENDENCE: ICD-10-CM

## 2018-12-18 DIAGNOSIS — D64.9 ANEMIA, UNSPECIFIED: ICD-10-CM

## 2018-12-18 DIAGNOSIS — C19 MALIGNANT NEOPLASM OF RECTOSIGMOID JUNCTION: ICD-10-CM

## 2018-12-18 DIAGNOSIS — Z99.89 DEPENDENCE ON OTHER ENABLING MACHINES AND DEVICES: ICD-10-CM

## 2018-12-18 DIAGNOSIS — E83.39 OTHER DISORDERS OF PHOSPHORUS METABOLISM: ICD-10-CM

## 2018-12-18 DIAGNOSIS — J45.909 UNSPECIFIED ASTHMA, UNCOMPLICATED: ICD-10-CM

## 2018-12-18 DIAGNOSIS — G47.33 OBSTRUCTIVE SLEEP APNEA (ADULT) (PEDIATRIC): ICD-10-CM

## 2018-12-28 ENCOUNTER — APPOINTMENT (OUTPATIENT)
Dept: COLORECTAL SURGERY | Facility: CLINIC | Age: 43
End: 2018-12-28
Payer: COMMERCIAL

## 2018-12-28 VITALS
BODY MASS INDEX: 39.17 KG/M2 | HEART RATE: 97 BPM | DIASTOLIC BLOOD PRESSURE: 83 MMHG | HEIGHT: 75 IN | TEMPERATURE: 98.1 F | WEIGHT: 315 LBS | SYSTOLIC BLOOD PRESSURE: 133 MMHG

## 2018-12-28 PROCEDURE — 99024 POSTOP FOLLOW-UP VISIT: CPT

## 2018-12-28 RX ORDER — METRONIDAZOLE 500 MG/1
500 TABLET ORAL
Qty: 3 | Refills: 0 | Status: DISCONTINUED | COMMUNITY
Start: 2018-08-07 | End: 2018-12-28

## 2018-12-28 RX ORDER — AMOXICILLIN AND CLAVULANATE POTASSIUM 875; 125 MG/1; MG/1
875-125 TABLET, COATED ORAL TWICE DAILY
Qty: 14 | Refills: 0 | Status: COMPLETED | COMMUNITY
Start: 2018-12-28 | End: 2019-01-04

## 2019-01-07 ENCOUNTER — OTHER (OUTPATIENT)
Age: 44
End: 2019-01-07

## 2019-01-15 ENCOUNTER — APPOINTMENT (OUTPATIENT)
Dept: COLORECTAL SURGERY | Facility: CLINIC | Age: 44
End: 2019-01-15
Payer: COMMERCIAL

## 2019-01-15 VITALS
WEIGHT: 295 LBS | HEART RATE: 102 BPM | DIASTOLIC BLOOD PRESSURE: 81 MMHG | BODY MASS INDEX: 36.68 KG/M2 | RESPIRATION RATE: 14 BRPM | SYSTOLIC BLOOD PRESSURE: 138 MMHG | HEIGHT: 75 IN

## 2019-01-15 PROCEDURE — 99024 POSTOP FOLLOW-UP VISIT: CPT

## 2019-01-17 RX ORDER — COLOSTOMY BAG, NON-STERILE 1/3" (14")
0.39"X18" EACH MISCELLANEOUS
Qty: 1 | Refills: 12 | Status: DISCONTINUED | OUTPATIENT
Start: 2019-01-07 | End: 2019-01-17

## 2019-01-17 NOTE — PHYSICAL EXAM
[Normal Breath Sounds] : Normal breath sounds [Normal Heart Sounds] : normal heart sounds [Normal Rate and Rhythm] : normal rate and rhythm [Alert] : alert [Oriented to Person] : oriented to person [Oriented to Place] : oriented to place [Oriented to Time] : oriented to time [Calm] : calm [Abdomen Masses] : No abdominal masses [Abdomen Tenderness] : ~T No ~M abdominal tenderness [de-identified] : obese, RLQ ileostomy pink, patent and viable. proximal aspect of midline incision area is now healed.  [de-identified] : well nourished, NAD

## 2019-01-17 NOTE — ASSESSMENT
[FreeTextEntry1] : Mr. Ortez presents to the office for a POA, wound check. s/p LAR with diverting ileostomy.  Doing well. Previously opened area on the abdominal incision is now fully healed. No erythema, drainage or odor noted.  RLQ ileostomy pink and viable. Instructed patient to continue keeping wound clean and dry, daily showers with antibacterial soap, ostomy care.  He will follow up with Dr. Velarde in 2 months to discuss ileostomy reversal.  F/u with Dr. Wolf for oncology.

## 2019-02-19 ENCOUNTER — APPOINTMENT (OUTPATIENT)
Dept: COLORECTAL SURGERY | Facility: CLINIC | Age: 44
End: 2019-02-19
Payer: COMMERCIAL

## 2019-02-19 VITALS
BODY MASS INDEX: 36.68 KG/M2 | TEMPERATURE: 98.1 F | DIASTOLIC BLOOD PRESSURE: 74 MMHG | SYSTOLIC BLOOD PRESSURE: 116 MMHG | HEART RATE: 103 BPM | WEIGHT: 295 LBS | HEIGHT: 75 IN

## 2019-02-19 PROCEDURE — 99024 POSTOP FOLLOW-UP VISIT: CPT

## 2019-02-20 NOTE — PHYSICAL EXAM
[Normal Breath Sounds] : Normal breath sounds [Normal Heart Sounds] : normal heart sounds [Normal Rate and Rhythm] : normal rate and rhythm [Alert] : alert [Oriented to Person] : oriented to person [Oriented to Place] : oriented to place [Oriented to Time] : oriented to time [Calm] : calm [de-identified] : obese, RLQ ileostomy, red and viable. parastomal skin is intact.  [de-identified] : well nourished, well appearing, NAD

## 2019-02-20 NOTE — ASSESSMENT
[FreeTextEntry1] : A: 43 year old with rectal cancer. s/p LAR with diverting ileostomy.  Reporting leakage and appliance adherence issues.  \par \par P:\par ileostomy dysfunction/watery output\par - Explained to the patient that he will never be completely leak free. But I reinforced dietary changes that he can incorporate into his diet to thicken his output: bananas, applesauce, etc\par - imodium, 2 caps 4 times per day; can adjust depending on output\par - fiber\par - follow up with me PRN for ostomy care\par - he will f/u with Dr. Velarde in March to discuss his reversal surgery.

## 2019-02-20 NOTE — HISTORY OF PRESENT ILLNESS
[FreeTextEntry1] : 43 year old male with rectal cancer on chemotherapy who is s/p LAR with diverting ileostomy.  He presents to office with concerns because he's had issues with leakage.  He had been going 5 days before needing to change his appliance but he has recently started chemotherapy and noticed an increase in his stoma output and it has been watery.  He has been taking Imodium but on once per day.  Denies fever/chills or sick contacts.

## 2019-02-27 ENCOUNTER — APPOINTMENT (OUTPATIENT)
Dept: COLORECTAL SURGERY | Facility: CLINIC | Age: 44
End: 2019-02-27

## 2019-03-09 NOTE — ASU PREOP CHECKLIST - HEIGHT IN INCHES
S/p cervical laminectomy C3-C6, C6-C7 w/ medial discectomy 3/3/19  Managed per primary team  Avoid hypoglycemia  Optimize BG control for surgical wound healing       3

## 2019-03-28 ENCOUNTER — OTHER (OUTPATIENT)
Age: 44
End: 2019-03-28

## 2019-04-01 ENCOUNTER — OTHER (OUTPATIENT)
Age: 44
End: 2019-04-01

## 2019-04-16 ENCOUNTER — APPOINTMENT (OUTPATIENT)
Dept: COLORECTAL SURGERY | Facility: CLINIC | Age: 44
End: 2019-04-16
Payer: COMMERCIAL

## 2019-04-16 VITALS
BODY MASS INDEX: 39.17 KG/M2 | HEIGHT: 75 IN | RESPIRATION RATE: 14 BRPM | HEART RATE: 99 BPM | WEIGHT: 315 LBS | SYSTOLIC BLOOD PRESSURE: 147 MMHG | DIASTOLIC BLOOD PRESSURE: 83 MMHG

## 2019-04-16 PROCEDURE — 99215 OFFICE O/P EST HI 40 MIN: CPT

## 2019-04-17 RX ORDER — LISINOPRIL 30 MG/1
TABLET ORAL
Refills: 0 | Status: ACTIVE | COMMUNITY

## 2019-04-17 RX ORDER — FLUTICASONE PROPIONATE 50 MCG
SPRAY, SUSPENSION NASAL
Refills: 0 | Status: ACTIVE | COMMUNITY

## 2019-04-17 NOTE — DATA REVIEWED
[FreeTextEntry1] : Recent PET scan demonstrates decreased uptake and rectum no evidence of metastasis\par MRI demonstrates shrinkage of primary tumor

## 2019-04-17 NOTE — PHYSICAL EXAM
[Abdomen Tenderness] : ~T No ~M abdominal tenderness [Abdomen Masses] : No abdominal masses [Tender] : nontender [JVD] : no jugular venous distention  [Normal Breath Sounds] : Normal breath sounds [Normal Rate and Rhythm] : normal rate and rhythm [No Rash or Lesion] : No rash or lesion [Normal Heart Sounds] : normal heart sounds [Oriented to Person] : oriented to person [Alert] : alert [Oriented to Place] : oriented to place [Oriented to Time] : oriented to time [Calm] : calm [de-identified] : pupils equal reactive to light normocephalic atraumatic. [de-identified] : Morbidly obese, ileostomy RLQ with parastomal hernia [de-identified] : Looks well in no distress, of stated age. [de-identified] : moves all 4 extremities appropriately with 5 over 5 strength

## 2019-04-17 NOTE — HISTORY OF PRESENT ILLNESS
[FreeTextEntry1] : 44-year-old male with cancer of the rectosigmoid rectal area treated with radiation and chemotherapy. and LAR with diverting ileostomy. finished with consolidation chemo. wishes to have ileostomy reversal.

## 2019-04-17 NOTE — ASSESSMENT
[FreeTextEntry1] : 44-year-old male with rectosigmoid/rectal colon cancer post chemoradiation and LAR with ileostomy. done with consolidation chemo and now wishes to have ileostomy reversal.   Recommend laparoscopic possible open ileostomy reversal, and repair of parastomal hernia.Risk and benefits of the surgery have been discussed which include bleeding, infection, sepsis, multiorgan failure, inadvertent injury including hollow viscus, solid organ, ureter neurovascular and neurological structures, DVT PE, heart attack, stroke, hernias, recurrence of tumor , leakage of anastomosis requiring ostomy and death.

## 2019-04-29 ENCOUNTER — OTHER (OUTPATIENT)
Age: 44
End: 2019-04-29

## 2019-05-01 ENCOUNTER — OUTPATIENT (OUTPATIENT)
Dept: OUTPATIENT SERVICES | Facility: HOSPITAL | Age: 44
LOS: 1 days | Discharge: HOME | End: 2019-05-01
Payer: COMMERCIAL

## 2019-05-01 VITALS
HEIGHT: 75 IN | TEMPERATURE: 98 F | SYSTOLIC BLOOD PRESSURE: 124 MMHG | DIASTOLIC BLOOD PRESSURE: 78 MMHG | RESPIRATION RATE: 16 BRPM | WEIGHT: 315 LBS | HEART RATE: 89 BPM | OXYGEN SATURATION: 99 %

## 2019-05-01 DIAGNOSIS — Z98.890 OTHER SPECIFIED POSTPROCEDURAL STATES: Chronic | ICD-10-CM

## 2019-05-01 DIAGNOSIS — Z90.89 ACQUIRED ABSENCE OF OTHER ORGANS: Chronic | ICD-10-CM

## 2019-05-01 DIAGNOSIS — K94.13 ENTEROSTOMY MALFUNCTION: ICD-10-CM

## 2019-05-01 DIAGNOSIS — C20 MALIGNANT NEOPLASM OF RECTUM: ICD-10-CM

## 2019-05-01 DIAGNOSIS — Z90.49 ACQUIRED ABSENCE OF OTHER SPECIFIED PARTS OF DIGESTIVE TRACT: Chronic | ICD-10-CM

## 2019-05-01 DIAGNOSIS — Z29.9 ENCOUNTER FOR PROPHYLACTIC MEASURES, UNSPECIFIED: ICD-10-CM

## 2019-05-01 DIAGNOSIS — K66.0 PERITONEAL ADHESIONS (POSTPROCEDURAL) (POSTINFECTION): ICD-10-CM

## 2019-05-01 DIAGNOSIS — Z41.9 ENCOUNTER FOR PROCEDURE FOR PURPOSES OTHER THAN REMEDYING HEALTH STATE, UNSPECIFIED: Chronic | ICD-10-CM

## 2019-05-01 LAB
ALBUMIN SERPL ELPH-MCNC: 4 G/DL — SIGNIFICANT CHANGE UP (ref 3.3–5)
ALLERGY+IMMUNOLOGY DIAG STUDY NOTE: SIGNIFICANT CHANGE UP
ALP SERPL-CCNC: 84 U/L — SIGNIFICANT CHANGE UP (ref 40–120)
ALT FLD-CCNC: 61 U/L — SIGNIFICANT CHANGE UP (ref 12–78)
ANION GAP SERPL CALC-SCNC: 12 MMOL/L — SIGNIFICANT CHANGE UP (ref 5–17)
APTT BLD: 35 SEC — SIGNIFICANT CHANGE UP (ref 27.5–36.3)
AST SERPL-CCNC: 44 U/L — HIGH (ref 15–37)
BASOPHILS # BLD AUTO: 0.03 K/UL — SIGNIFICANT CHANGE UP (ref 0–0.2)
BASOPHILS NFR BLD AUTO: 0.5 % — SIGNIFICANT CHANGE UP (ref 0–2)
BILIRUB DIRECT SERPL-MCNC: 0.2 MG/DL — SIGNIFICANT CHANGE UP (ref 0–0.2)
BILIRUB SERPL-MCNC: 0.9 MG/DL — SIGNIFICANT CHANGE UP (ref 0.2–1.2)
BUN SERPL-MCNC: 18 MG/DL — SIGNIFICANT CHANGE UP (ref 7–23)
CALCIUM SERPL-MCNC: 9.1 MG/DL — SIGNIFICANT CHANGE UP (ref 8.5–10.1)
CHLORIDE SERPL-SCNC: 106 MMOL/L — SIGNIFICANT CHANGE UP (ref 96–108)
CO2 SERPL-SCNC: 21 MMOL/L — LOW (ref 22–31)
CREAT SERPL-MCNC: 0.95 MG/DL — SIGNIFICANT CHANGE UP (ref 0.5–1.3)
EOSINOPHIL # BLD AUTO: 0.13 K/UL — SIGNIFICANT CHANGE UP (ref 0–0.5)
EOSINOPHIL NFR BLD AUTO: 2.1 % — SIGNIFICANT CHANGE UP (ref 0–6)
GLUCOSE SERPL-MCNC: 97 MG/DL — SIGNIFICANT CHANGE UP (ref 70–99)
HCT VFR BLD CALC: 41.1 % — SIGNIFICANT CHANGE UP (ref 39–50)
HGB BLD-MCNC: 14.6 G/DL — SIGNIFICANT CHANGE UP (ref 13–17)
IMM GRANULOCYTES NFR BLD AUTO: 0.6 % — SIGNIFICANT CHANGE UP (ref 0–1.5)
INR BLD: 0.97 RATIO — SIGNIFICANT CHANGE UP (ref 0.88–1.16)
LYMPHOCYTES # BLD AUTO: 0.58 K/UL — LOW (ref 1–3.3)
LYMPHOCYTES # BLD AUTO: 9.3 % — LOW (ref 13–44)
MCHC RBC-ENTMCNC: 31.7 PG — SIGNIFICANT CHANGE UP (ref 27–34)
MCHC RBC-ENTMCNC: 35.5 GM/DL — SIGNIFICANT CHANGE UP (ref 32–36)
MCV RBC AUTO: 89.3 FL — SIGNIFICANT CHANGE UP (ref 80–100)
MONOCYTES # BLD AUTO: 0.46 K/UL — SIGNIFICANT CHANGE UP (ref 0–0.9)
MONOCYTES NFR BLD AUTO: 7.4 % — SIGNIFICANT CHANGE UP (ref 2–14)
NEUTROPHILS # BLD AUTO: 4.97 K/UL — SIGNIFICANT CHANGE UP (ref 1.8–7.4)
NEUTROPHILS NFR BLD AUTO: 80.1 % — HIGH (ref 43–77)
NRBC # BLD: 0 /100 WBCS — SIGNIFICANT CHANGE UP (ref 0–0)
PLATELET # BLD AUTO: 181 K/UL — SIGNIFICANT CHANGE UP (ref 150–400)
POTASSIUM SERPL-MCNC: 4.1 MMOL/L — SIGNIFICANT CHANGE UP (ref 3.5–5.3)
POTASSIUM SERPL-SCNC: 4.1 MMOL/L — SIGNIFICANT CHANGE UP (ref 3.5–5.3)
PROT SERPL-MCNC: 7.7 GM/DL — SIGNIFICANT CHANGE UP (ref 6–8.3)
PROTHROM AB SERPL-ACNC: 10.7 SEC — SIGNIFICANT CHANGE UP (ref 10–12.9)
RBC # BLD: 4.6 M/UL — SIGNIFICANT CHANGE UP (ref 4.2–5.8)
RBC # FLD: 14.1 % — SIGNIFICANT CHANGE UP (ref 10.3–14.5)
SODIUM SERPL-SCNC: 139 MMOL/L — SIGNIFICANT CHANGE UP (ref 135–145)
WBC # BLD: 6.21 K/UL — SIGNIFICANT CHANGE UP (ref 3.8–10.5)
WBC # FLD AUTO: 6.21 K/UL — SIGNIFICANT CHANGE UP (ref 3.8–10.5)

## 2019-05-01 PROCEDURE — 93010 ELECTROCARDIOGRAM REPORT: CPT

## 2019-05-01 RX ORDER — FLUTICASONE PROPIONATE 50 MCG
1 SPRAY, SUSPENSION NASAL
Qty: 0 | Refills: 0 | COMMUNITY

## 2019-05-01 NOTE — H&P PST ADULT - NSICDXPASTSURGICALHX_GEN_ALL_CORE_FT
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 tube in right ear presently    History of other surgery port cath insertion 08/2018    S/P colonoscopy     S/P T&A (status post tonsillectomy and adenoidectomy)     Status post proctocolectomy with temporary Ileostomy

## 2019-05-01 NOTE — H&P PST ADULT - NSICDXFAMILYHX_GEN_ALL_CORE_FT
FAMILY HISTORY:  Father  Still living? Yes, Estimated age: 81-90  Family history of hypertension, Age at diagnosis: Age Unknown  Family history of renal disease, Age at diagnosis: Age Unknown

## 2019-05-01 NOTE — H&P PST ADULT - TEACHING/LEARNING LEARNING PREFERENCES
computer/internet/video/written material/verbal instruction/skill demonstration/pictorial/audio/individual instruction/group instruction

## 2019-05-01 NOTE — H&P PST ADULT - HISTORY OF PRESENT ILLNESS
44 years old male with a history of rectal cancer. Pt reports he completed chemotherapy and radiation therapy. Surgery with Ileostomy 11/ 2018. Planned reversal of ileostomy.

## 2019-05-01 NOTE — H&P PST ADULT - ASSESSMENT
44 years old male present to PST prior to laparoscopic, possible open reversal of ileostomy, repair of parastomal hernia with Dr. Velarde.    Plan   1. NPO after midnight  2. Use Flonase  3. Use E-Z sponge as directed  4. Use Mupirocin as directed.  5. Drink a quart of extra  fluids the day before your surgery.  6 Medical clearance with Dr. Calixto and cardiac clearance with Dr. Flores  7. CBC, BMP, LFT,  PT/PTT/INR, CEA, HGB AIC, Type and Screen sent to lab  8. EKG done  9. Chest x-ray on chart    CAPRINI SCORE [CLOT]    AGE RELATED RISK FACTORS                                                       MOBILITY RELATED FACTORS  [ x] Age 41-60 years                                            (1 Point)                  [ ] Bed rest                                                        (1 Point)  [ ] Age: 61-74 years                                           (2 Points)                 [ ] Plaster cast                                                   (2 Points)  [ ] Age= 75 years                                              (3 Points)                 [ ] Bed bound for more than 72 hours                 (2 Points)    DISEASE RELATED RISK FACTORS                                               GENDER SPECIFIC FACTORS  [ ] Edema in the lower extremities                       (1 Point)                  [ ] Pregnancy                                                     (1 Point)  [ ] Varicose veins                                               (1 Point)                  [ ] Post-partum < 6 weeks                                   (1 Point)             [x ] BMI > 25 Kg/m2                                            (1 Point)                  [ ] Hormonal therapy  or oral contraception          (1 Point)                 [ ] Sepsis (in the previous month)                        (1 Point)                  [ ] History of pregnancy complications                 (1 point)  [ ] Pneumonia or serious lung disease                                               [ ] Unexplained or recurrent                     (1 Point)           (in the previous month)                               (1 Point)  [ ] Abnormal pulmonary function test                     (1 Point)                 SURGERY RELATED RISK FACTORS  [ ] Acute myocardial infarction                              (1 Point)                 [ ]  Section                                             (1 Point)  [ ] Congestive heart failure (in the previous month)  (1 Point)               [ ] Minor surgery                                                  (1 Point)   [ ] Inflammatory bowel disease                             (1 Point)                 [ ] Arthroscopic surgery                                        (2 Points)  [ ] Central venous access                                      (2 Points)                [x ] General surgery lasting more than 45 minutes   (2 Points)       [ ] Stroke (in the previous month)                         (5 Points)               [ ] Elective arthroplasty                                         (5 Points)                                                                                                                                               HEMATOLOGY RELATED FACTORS                                                 TRAUMA RELATED RISK FACTORS  [ ] Prior episodes of VTE                                     (3 Points)                 [ ] Fracture of the hip, pelvis, or leg                       (5 Points)  [ ] Positive family history for VTE                         (3 Points)                 [ ] Acute spinal cord injury (in the previous month)  (5 Points)  [ ] Prothrombin 59798 A                                     (3 Points)                 [ ] Paralysis  (less than 1 month)                             (5 Points)  [ ] Factor V Leiden                                             (3 Points)                  [ ] Multiple Trauma within 1 month                        (5 Points)  [ ] Lupus anticoagulants                                     (3 Points)                                                           [ ] Anticardiolipin antibodies                               (3 Points)                                                       [ ] High homocysteine in the blood                      (3 Points)                                             [ ] Other congenital or acquired thrombophilia      (3 Points)                                                [ ] Heparin induced thrombocytopenia                  (3 Points)    ( x ) malignancy                                                   (2 points)      Total Score [      6    ]

## 2019-05-01 NOTE — H&P PST ADULT - NSICDXPASTMEDICALHX_GEN_ALL_CORE_FT
PAST MEDICAL HISTORY:  Anemia     Essential hypertension     Foot drop, left     Hearing loss b/l    Iron deficiency anemia due to chronic blood loss     Lumbar herniated disc     Mild intermittent asthma without complication     Morbid obesity     Obstructive sleep apnea syndrome not using machine    Port-A-Cath in place right chest wall    Rectal cancer completed chemotherapy 2 weeks ago and radiation a month ago. Surgery with Ileostomy 11/ 2018    Sarcoma     Seasonal allergies

## 2019-05-02 LAB
CEA SERPL-MCNC: 0.8 NG/ML — SIGNIFICANT CHANGE UP (ref 0–3.8)
HBA1C BLD-MCNC: 5.3 % — SIGNIFICANT CHANGE UP (ref 4–5.6)

## 2019-05-06 ENCOUNTER — OUTPATIENT (OUTPATIENT)
Dept: OUTPATIENT SERVICES | Facility: HOSPITAL | Age: 44
LOS: 1 days | Discharge: HOME | End: 2019-05-06
Payer: COMMERCIAL

## 2019-05-06 DIAGNOSIS — Z98.890 OTHER SPECIFIED POSTPROCEDURAL STATES: Chronic | ICD-10-CM

## 2019-05-06 DIAGNOSIS — K94.13 ENTEROSTOMY MALFUNCTION: ICD-10-CM

## 2019-05-06 DIAGNOSIS — Z41.9 ENCOUNTER FOR PROCEDURE FOR PURPOSES OTHER THAN REMEDYING HEALTH STATE, UNSPECIFIED: Chronic | ICD-10-CM

## 2019-05-06 DIAGNOSIS — Z90.49 ACQUIRED ABSENCE OF OTHER SPECIFIED PARTS OF DIGESTIVE TRACT: Chronic | ICD-10-CM

## 2019-05-06 DIAGNOSIS — Z90.89 ACQUIRED ABSENCE OF OTHER ORGANS: Chronic | ICD-10-CM

## 2019-05-06 PROCEDURE — 74270 X-RAY XM COLON 1CNTRST STD: CPT | Mod: 26

## 2019-05-06 NOTE — DIETITIAN INITIAL EVALUATION ADULT. - ENERGY NEEDS
Ultrasound machine at bedside for ER MD Salazar Murrell, RN  05/06/19 1060
Ht: 190.5cm    Wt: 140.6Kg    BMI: 38.7   IBW: 81.82Kg    Pt is at: 172.84% IBW

## 2019-05-08 RX ORDER — SODIUM CHLORIDE 9 MG/ML
3 INJECTION INTRAMUSCULAR; INTRAVENOUS; SUBCUTANEOUS EVERY 8 HOURS
Refills: 0 | Status: DISCONTINUED | OUTPATIENT
Start: 2019-05-09 | End: 2019-05-11

## 2019-05-09 ENCOUNTER — RESULT REVIEW (OUTPATIENT)
Age: 44
End: 2019-05-09

## 2019-05-09 ENCOUNTER — INPATIENT (INPATIENT)
Facility: HOSPITAL | Age: 44
LOS: 1 days | Discharge: HOME | End: 2019-05-11
Attending: COLON & RECTAL SURGERY | Admitting: COLON & RECTAL SURGERY
Payer: COMMERCIAL

## 2019-05-09 ENCOUNTER — APPOINTMENT (OUTPATIENT)
Dept: COLORECTAL SURGERY | Facility: HOSPITAL | Age: 44
End: 2019-05-09
Payer: COMMERCIAL

## 2019-05-09 VITALS
HEART RATE: 86 BPM | DIASTOLIC BLOOD PRESSURE: 79 MMHG | HEIGHT: 75 IN | RESPIRATION RATE: 16 BRPM | WEIGHT: 315 LBS | OXYGEN SATURATION: 97 % | TEMPERATURE: 97 F | SYSTOLIC BLOOD PRESSURE: 128 MMHG

## 2019-05-09 DIAGNOSIS — Z98.890 OTHER SPECIFIED POSTPROCEDURAL STATES: Chronic | ICD-10-CM

## 2019-05-09 DIAGNOSIS — Z90.89 ACQUIRED ABSENCE OF OTHER ORGANS: Chronic | ICD-10-CM

## 2019-05-09 DIAGNOSIS — Z90.49 ACQUIRED ABSENCE OF OTHER SPECIFIED PARTS OF DIGESTIVE TRACT: Chronic | ICD-10-CM

## 2019-05-09 DIAGNOSIS — Z41.9 ENCOUNTER FOR PROCEDURE FOR PURPOSES OTHER THAN REMEDYING HEALTH STATE, UNSPECIFIED: Chronic | ICD-10-CM

## 2019-05-09 DIAGNOSIS — K94.13 ENTEROSTOMY MALFUNCTION: ICD-10-CM

## 2019-05-09 PROCEDURE — 44346 REVISION OF COLOSTOMY: CPT

## 2019-05-09 PROCEDURE — 88302 TISSUE EXAM BY PATHOLOGIST: CPT | Mod: 26

## 2019-05-09 PROCEDURE — 44310 ILEOSTOMY/JEJUNOSTOMY: CPT

## 2019-05-09 PROCEDURE — 88304 TISSUE EXAM BY PATHOLOGIST: CPT | Mod: 26

## 2019-05-09 RX ORDER — ONDANSETRON 8 MG/1
4 TABLET, FILM COATED ORAL ONCE
Refills: 0 | Status: DISCONTINUED | OUTPATIENT
Start: 2019-05-09 | End: 2019-05-09

## 2019-05-09 RX ORDER — SODIUM CHLORIDE 9 MG/ML
1000 INJECTION, SOLUTION INTRAVENOUS
Refills: 0 | Status: DISCONTINUED | OUTPATIENT
Start: 2019-05-09 | End: 2019-05-09

## 2019-05-09 RX ORDER — OXYCODONE HYDROCHLORIDE 5 MG/1
10 TABLET ORAL EVERY 4 HOURS
Refills: 0 | Status: DISCONTINUED | OUTPATIENT
Start: 2019-05-09 | End: 2019-05-11

## 2019-05-09 RX ORDER — KETOROLAC TROMETHAMINE 30 MG/ML
30 SYRINGE (ML) INJECTION EVERY 6 HOURS
Refills: 0 | Status: DISCONTINUED | OUTPATIENT
Start: 2019-05-09 | End: 2019-05-11

## 2019-05-09 RX ORDER — MEPERIDINE HYDROCHLORIDE 50 MG/ML
12.5 INJECTION INTRAMUSCULAR; INTRAVENOUS; SUBCUTANEOUS
Refills: 0 | Status: DISCONTINUED | OUTPATIENT
Start: 2019-05-09 | End: 2019-05-09

## 2019-05-09 RX ORDER — ONDANSETRON 8 MG/1
4 TABLET, FILM COATED ORAL EVERY 6 HOURS
Refills: 0 | Status: DISCONTINUED | OUTPATIENT
Start: 2019-05-09 | End: 2019-05-11

## 2019-05-09 RX ORDER — HEPARIN SODIUM 5000 [USP'U]/ML
5000 INJECTION INTRAVENOUS; SUBCUTANEOUS EVERY 8 HOURS
Refills: 0 | Status: DISCONTINUED | OUTPATIENT
Start: 2019-05-10 | End: 2019-05-11

## 2019-05-09 RX ORDER — METOCLOPRAMIDE HCL 10 MG
10 TABLET ORAL ONCE
Refills: 0 | Status: COMPLETED | OUTPATIENT
Start: 2019-05-09 | End: 2019-05-09

## 2019-05-09 RX ORDER — ACETAMINOPHEN 500 MG
650 TABLET ORAL EVERY 6 HOURS
Refills: 0 | Status: DISCONTINUED | OUTPATIENT
Start: 2019-05-09 | End: 2019-05-11

## 2019-05-09 RX ORDER — HEPARIN SODIUM 5000 [USP'U]/ML
5000 INJECTION INTRAVENOUS; SUBCUTANEOUS ONCE
Refills: 0 | Status: COMPLETED | OUTPATIENT
Start: 2019-05-09 | End: 2019-05-09

## 2019-05-09 RX ORDER — ALVIMOPAN 12 MG/1
12 CAPSULE ORAL ONCE
Refills: 0 | Status: COMPLETED | OUTPATIENT
Start: 2019-05-09 | End: 2019-05-09

## 2019-05-09 RX ORDER — ALVIMOPAN 12 MG/1
12 CAPSULE ORAL
Refills: 0 | Status: DISCONTINUED | OUTPATIENT
Start: 2019-05-09 | End: 2019-05-11

## 2019-05-09 RX ORDER — ACETAMINOPHEN 500 MG
1000 TABLET ORAL ONCE
Refills: 0 | Status: COMPLETED | OUTPATIENT
Start: 2019-05-09 | End: 2019-05-09

## 2019-05-09 RX ORDER — OXYCODONE HYDROCHLORIDE 5 MG/1
5 TABLET ORAL EVERY 4 HOURS
Refills: 0 | Status: DISCONTINUED | OUTPATIENT
Start: 2019-05-09 | End: 2019-05-11

## 2019-05-09 RX ORDER — CEFAZOLIN SODIUM 1 G
2000 VIAL (EA) INJECTION ONCE
Refills: 0 | Status: COMPLETED | OUTPATIENT
Start: 2019-05-09 | End: 2019-05-09

## 2019-05-09 RX ORDER — OXYCODONE HYDROCHLORIDE 5 MG/1
10 TABLET ORAL ONCE
Refills: 0 | Status: DISCONTINUED | OUTPATIENT
Start: 2019-05-09 | End: 2019-05-09

## 2019-05-09 RX ORDER — FAMOTIDINE 10 MG/ML
20 INJECTION INTRAVENOUS ONCE
Refills: 0 | Status: COMPLETED | OUTPATIENT
Start: 2019-05-09 | End: 2019-05-09

## 2019-05-09 RX ORDER — ACETAMINOPHEN 500 MG
975 TABLET ORAL ONCE
Refills: 0 | Status: COMPLETED | OUTPATIENT
Start: 2019-05-09 | End: 2019-05-09

## 2019-05-09 RX ORDER — LANOLIN ALCOHOL/MO/W.PET/CERES
5 CREAM (GRAM) TOPICAL AT BEDTIME
Refills: 0 | Status: DISCONTINUED | OUTPATIENT
Start: 2019-05-09 | End: 2019-05-11

## 2019-05-09 RX ORDER — LISINOPRIL 2.5 MG/1
20 TABLET ORAL DAILY
Refills: 0 | Status: DISCONTINUED | OUTPATIENT
Start: 2019-05-09 | End: 2019-05-11

## 2019-05-09 RX ORDER — HYDROMORPHONE HYDROCHLORIDE 2 MG/ML
0.5 INJECTION INTRAMUSCULAR; INTRAVENOUS; SUBCUTANEOUS
Refills: 0 | Status: DISCONTINUED | OUTPATIENT
Start: 2019-05-09 | End: 2019-05-09

## 2019-05-09 RX ADMIN — SODIUM CHLORIDE 125 MILLILITER(S): 9 INJECTION, SOLUTION INTRAVENOUS at 13:15

## 2019-05-09 RX ADMIN — OXYCODONE HYDROCHLORIDE 10 MILLIGRAM(S): 5 TABLET ORAL at 09:37

## 2019-05-09 RX ADMIN — Medication 975 MILLIGRAM(S): at 09:37

## 2019-05-09 RX ADMIN — SODIUM CHLORIDE 125 MILLILITER(S): 9 INJECTION, SOLUTION INTRAVENOUS at 20:09

## 2019-05-09 RX ADMIN — HYDROMORPHONE HYDROCHLORIDE 0.5 MILLIGRAM(S): 2 INJECTION INTRAMUSCULAR; INTRAVENOUS; SUBCUTANEOUS at 13:41

## 2019-05-09 RX ADMIN — FAMOTIDINE 20 MILLIGRAM(S): 10 INJECTION INTRAVENOUS at 09:37

## 2019-05-09 RX ADMIN — Medication 100 MILLIGRAM(S): at 18:37

## 2019-05-09 RX ADMIN — Medication 975 MILLIGRAM(S): at 09:36

## 2019-05-09 RX ADMIN — Medication 400 MILLIGRAM(S): at 21:03

## 2019-05-09 RX ADMIN — Medication 1000 MILLIGRAM(S): at 21:25

## 2019-05-09 RX ADMIN — OXYCODONE HYDROCHLORIDE 5 MILLIGRAM(S): 5 TABLET ORAL at 16:49

## 2019-05-09 RX ADMIN — HEPARIN SODIUM 5000 UNIT(S): 5000 INJECTION INTRAVENOUS; SUBCUTANEOUS at 09:37

## 2019-05-09 RX ADMIN — HYDROMORPHONE HYDROCHLORIDE 0.5 MILLIGRAM(S): 2 INJECTION INTRAMUSCULAR; INTRAVENOUS; SUBCUTANEOUS at 13:11

## 2019-05-09 RX ADMIN — OXYCODONE HYDROCHLORIDE 10 MILLIGRAM(S): 5 TABLET ORAL at 09:38

## 2019-05-09 RX ADMIN — ALVIMOPAN 12 MILLIGRAM(S): 12 CAPSULE ORAL at 09:37

## 2019-05-09 RX ADMIN — Medication 10 MILLIGRAM(S): at 09:37

## 2019-05-09 RX ADMIN — Medication 30 MILLIGRAM(S): at 18:36

## 2019-05-09 RX ADMIN — ALVIMOPAN 12 MILLIGRAM(S): 12 CAPSULE ORAL at 18:36

## 2019-05-09 NOTE — PATIENT PROFILE ADULT - NSPROEDAREADYLEARN_GEN_A_NUR
"              After Visit Summary   11/9/2018    Jenny Ramirez    MRN: [id number]           Patient Information     Date Of Birth          1973        Visit Information        Provider Department      11/9/2018 11:00 AM Jenny Hays APRN CNP [facility]        Today's Diagnoses     Encounter for gynecological examination without abnormal finding    -  1    Major depressive disorder, recurrent episode, mild (H)        Generalized anxiety disorder           Follow-ups after your visit        Who to contact     If you have questions or need follow up information about today's clinic visit or your schedule please contact [facility] directly at [phone].  Normal or non-critical lab and imaging results will be communicated to you by MyChart, letter or phone within 4 business days after the clinic has received the results. If you do not hear from us within 7 days, please contact the clinic through [employer] or phone. If you have a critical or abnormal lab result, we will notify you by phone as soon as possible.  Submit refill requests through [employer] or call your pharmacy and they will forward the refill request to us. Please allow 3 business days for your refill to be completed.          Additional Information About Your Visit        MyChart Information     [employer] gives you secure access to your electronic health record. If you see a primary care provider, you can also send messages to your care team and make appointments. If you have questions, please call your primary care clinic.  If you do not have a primary care provider, please call [phone] and they will assist you.        Care EveryWhere ID     This is your Care EveryWhere ID. This could be used by other organizations to access your [facility] medical records  [id number]        Your Vitals Were     Height Last Period BMI (Body Mass Index)             5' 1.5\" (1.562 m) 08/09/2018 23.05 kg/m2          Blood " Pressure from Last 3 Encounters:   11/09/18 122/70   02/16/18 132/90   09/22/17 140/90    Weight from Last 3 Encounters:   11/09/18 124 lb (56.2 kg)   02/16/18 135 lb (61.2 kg)   09/22/17 127 lb (57.6 kg)              Today, you had the following     No orders found for display         Today's Medication Changes          These changes are accurate as of 11/9/18  2:10 PM.  If you have any questions, ask your nurse or doctor.               These medicines have changed or have updated prescriptions.        Dose/Directions    buPROPion 300 MG 24 hr tablet   Commonly known as:  WELLBUTRIN XL   This may have changed:  See the new instructions.   Used for:  Major depressive disorder, recurrent episode, mild (H)   Changed by:  Jenny Hays APRN CNP        Dose:  300 mg   Take 1 tablet (300 mg) by mouth every morning   Quantity:  90 tablet   Refills:  3            Where to get your medicines      These medications were sent to [facility] [address] [address]     Phone:  [phone]     buPROPion 300 MG 24 hr tablet         Some of these will need a paper prescription and others can be bought over the counter.  Ask your nurse if you have questions.     Bring a paper prescription for each of these medications     ALPRAZolam 0.25 MG tablet                Primary Care Provider Office Phone # Fax #    JADE Carrillo [phone] [address] [address]        Equal Access to Services     CHRISTIE CHINO AH: Hadii gissel ku hadasho Soomaali, waaxda luqadaha, qaybta kaalmada adeegyada, waxay marisa saleem . So [facility] [phone].    ATENCIÓN: Si habla español, tiene a hampton disposición servicios gratuitos de asistencia lingüística. Llame al [phone].    We comply with applicable federal civil rights laws and [place] laws. We do not discriminate on the basis of race, color,  national origin, age, disability, sex, sexual orientation, or gender identity.            Thank you!     Thank you for choosing [facility]  for your care. Our goal is always to provide you with excellent care. Hearing back from our patients is one way we can continue to improve our services. Please take a few minutes to complete the written survey that you may receive in the mail after your visit with us. Thank you!             Your Updated Medication List - Protect others around you: Learn how to safely use, store and throw away your medicines at www.disposemymeds.org.          This list is accurate as of 11/9/18  2:10 PM.  Always use your most recent med list.                   Brand Name Dispense Instructions for use Diagnosis    ALPRAZolam 0.25 MG tablet    XANAX    30 tablet    Take 1 tablet (0.25 mg) by mouth 3 times daily as needed for anxiety (pt uses rarely)    Generalized anxiety disorder       buPROPion 300 MG 24 hr tablet    WELLBUTRIN XL    90 tablet    Take 1 tablet (300 mg) by mouth every morning    Major depressive disorder, recurrent episode, mild (H)       progesterone 100 MG capsule    PROMETRIUM     TK 1 C PO QHS        UNABLE TO FIND      MEDICATION NAME: Bio-identical hormone.        Vitamin D-3 1000 units Caps              none

## 2019-05-09 NOTE — BRIEF OPERATIVE NOTE - NSICDXBRIEFPREOP_GEN_ALL_CORE_FT
PRE-OP DIAGNOSIS:  Parastomal hernia 09-May-2019 12:47:38  Alexey Romero  Status post ileostomy 09-May-2019 12:47:33  Alexey Romero  Parastomal hernia 09-May-2019 12:47:25  Alexey Romero  Status post ileostomy 09-May-2019 12:47:18  Alexey Romero

## 2019-05-09 NOTE — BRIEF OPERATIVE NOTE - NSICDXBRIEFPROCEDURE_GEN_ALL_CORE_FT
PROCEDURES:  Laparoscopic repair of parastomal hernia using mesh 09-May-2019 12:47:05  Alexey Romero  Laparoscopic reversal of ileostomy 09-May-2019 12:46:54  Alexey Romero

## 2019-05-10 LAB
ANION GAP SERPL CALC-SCNC: 6 MMOL/L — SIGNIFICANT CHANGE UP (ref 5–17)
BASOPHILS # BLD AUTO: 0.01 K/UL — SIGNIFICANT CHANGE UP (ref 0–0.2)
BASOPHILS NFR BLD AUTO: 0.1 % — SIGNIFICANT CHANGE UP (ref 0–2)
BUN SERPL-MCNC: 19 MG/DL — SIGNIFICANT CHANGE UP (ref 7–23)
CALCIUM SERPL-MCNC: 8.4 MG/DL — LOW (ref 8.5–10.1)
CHLORIDE SERPL-SCNC: 107 MMOL/L — SIGNIFICANT CHANGE UP (ref 96–108)
CO2 SERPL-SCNC: 27 MMOL/L — SIGNIFICANT CHANGE UP (ref 22–31)
CREAT SERPL-MCNC: 1.02 MG/DL — SIGNIFICANT CHANGE UP (ref 0.5–1.3)
EOSINOPHIL # BLD AUTO: 0.01 K/UL — SIGNIFICANT CHANGE UP (ref 0–0.5)
EOSINOPHIL NFR BLD AUTO: 0.1 % — SIGNIFICANT CHANGE UP (ref 0–6)
GLUCOSE SERPL-MCNC: 111 MG/DL — HIGH (ref 70–99)
HCT VFR BLD CALC: 37.6 % — LOW (ref 39–50)
HGB BLD-MCNC: 12.9 G/DL — LOW (ref 13–17)
IMM GRANULOCYTES NFR BLD AUTO: 0.5 % — SIGNIFICANT CHANGE UP (ref 0–1.5)
LYMPHOCYTES # BLD AUTO: 0.56 K/UL — LOW (ref 1–3.3)
LYMPHOCYTES # BLD AUTO: 6.4 % — LOW (ref 13–44)
MCHC RBC-ENTMCNC: 31.1 PG — SIGNIFICANT CHANGE UP (ref 27–34)
MCHC RBC-ENTMCNC: 34.3 GM/DL — SIGNIFICANT CHANGE UP (ref 32–36)
MCV RBC AUTO: 90.6 FL — SIGNIFICANT CHANGE UP (ref 80–100)
MONOCYTES # BLD AUTO: 0.61 K/UL — SIGNIFICANT CHANGE UP (ref 0–0.9)
MONOCYTES NFR BLD AUTO: 7 % — SIGNIFICANT CHANGE UP (ref 2–14)
NEUTROPHILS # BLD AUTO: 7.47 K/UL — HIGH (ref 1.8–7.4)
NEUTROPHILS NFR BLD AUTO: 85.9 % — HIGH (ref 43–77)
NRBC # BLD: 0 /100 WBCS — SIGNIFICANT CHANGE UP (ref 0–0)
PLATELET # BLD AUTO: 139 K/UL — LOW (ref 150–400)
POTASSIUM SERPL-MCNC: 4 MMOL/L — SIGNIFICANT CHANGE UP (ref 3.5–5.3)
POTASSIUM SERPL-SCNC: 4 MMOL/L — SIGNIFICANT CHANGE UP (ref 3.5–5.3)
RBC # BLD: 4.15 M/UL — LOW (ref 4.2–5.8)
RBC # FLD: 13.3 % — SIGNIFICANT CHANGE UP (ref 10.3–14.5)
SODIUM SERPL-SCNC: 140 MMOL/L — SIGNIFICANT CHANGE UP (ref 135–145)
WBC # BLD: 8.7 K/UL — SIGNIFICANT CHANGE UP (ref 3.8–10.5)
WBC # FLD AUTO: 8.7 K/UL — SIGNIFICANT CHANGE UP (ref 3.8–10.5)

## 2019-05-10 RX ADMIN — SODIUM CHLORIDE 3 MILLILITER(S): 9 INJECTION INTRAMUSCULAR; INTRAVENOUS; SUBCUTANEOUS at 12:51

## 2019-05-10 RX ADMIN — SODIUM CHLORIDE 3 MILLILITER(S): 9 INJECTION INTRAMUSCULAR; INTRAVENOUS; SUBCUTANEOUS at 21:07

## 2019-05-10 RX ADMIN — OXYCODONE HYDROCHLORIDE 5 MILLIGRAM(S): 5 TABLET ORAL at 12:48

## 2019-05-10 RX ADMIN — Medication 30 MILLIGRAM(S): at 06:00

## 2019-05-10 RX ADMIN — Medication 5 MILLIGRAM(S): at 23:41

## 2019-05-10 RX ADMIN — SODIUM CHLORIDE 3 MILLILITER(S): 9 INJECTION INTRAMUSCULAR; INTRAVENOUS; SUBCUTANEOUS at 05:05

## 2019-05-10 RX ADMIN — ALVIMOPAN 12 MILLIGRAM(S): 12 CAPSULE ORAL at 05:04

## 2019-05-10 RX ADMIN — HEPARIN SODIUM 5000 UNIT(S): 5000 INJECTION INTRAVENOUS; SUBCUTANEOUS at 14:24

## 2019-05-10 RX ADMIN — HEPARIN SODIUM 5000 UNIT(S): 5000 INJECTION INTRAVENOUS; SUBCUTANEOUS at 05:05

## 2019-05-10 RX ADMIN — OXYCODONE HYDROCHLORIDE 5 MILLIGRAM(S): 5 TABLET ORAL at 08:39

## 2019-05-10 RX ADMIN — LISINOPRIL 20 MILLIGRAM(S): 2.5 TABLET ORAL at 05:04

## 2019-05-10 RX ADMIN — Medication 30 MILLIGRAM(S): at 21:30

## 2019-05-10 RX ADMIN — ALVIMOPAN 12 MILLIGRAM(S): 12 CAPSULE ORAL at 17:50

## 2019-05-10 RX ADMIN — Medication 5 MILLIGRAM(S): at 00:06

## 2019-05-10 RX ADMIN — Medication 30 MILLIGRAM(S): at 21:02

## 2019-05-10 RX ADMIN — Medication 30 MILLIGRAM(S): at 00:07

## 2019-05-10 RX ADMIN — Medication 30 MILLIGRAM(S): at 00:20

## 2019-05-10 RX ADMIN — HEPARIN SODIUM 5000 UNIT(S): 5000 INJECTION INTRAVENOUS; SUBCUTANEOUS at 22:31

## 2019-05-10 RX ADMIN — OXYCODONE HYDROCHLORIDE 5 MILLIGRAM(S): 5 TABLET ORAL at 09:30

## 2019-05-10 RX ADMIN — Medication 30 MILLIGRAM(S): at 14:23

## 2019-05-10 NOTE — PROGRESS NOTE ADULT - ASSESSMENT
s/p ileostomy reversal and parastomal hernia repair  rectal cancer    doing well  full liquids  dc ivf  oob, ambulate  await some bowel function  anticipate dc home tomorrow

## 2019-05-10 NOTE — PROGRESS NOTE ADULT - SUBJECTIVE AND OBJECTIVE BOX
no n/v  feels well  no bowel function yet.    Vital Signs Last 24 Hrs  T(C): 36.4 (10 May 2019 04:34), Max: 36.4 (09 May 2019 12:40)  T(F): 97.6 (10 May 2019 04:34), Max: 97.6 (10 May 2019 04:34)  HR: 88 (10 May 2019 04:34) (76 - 91)  BP: 110/62 (10 May 2019 04:34) (100/54 - 128/79)  BP(mean): --  RR: 18 (10 May 2019 04:34) (10 - 18)  SpO2: 98% (10 May 2019 04:34) (93% - 98%)    NAD  abd soft  prevena in place                          12.9   8.70  )-----------( 139      ( 10 May 2019 05:42 )             37.6   05-10    140  |  107  |  19  ----------------------------<  111<H>  4.0   |  27  |  1.02    Ca    8.4<L>      10 May 2019 05:42

## 2019-05-10 NOTE — CONSULT NOTE ADULT - ATTENDING COMMENTS
Patient seen and examined with NP Terrie Das.  I personally had a face-to-face encounter with the patient, examined the patient myself and reviewed the plan of care with the patient and NP.   I agree with the assessment and plan of NP as stated and discussed.

## 2019-05-10 NOTE — CONSULT NOTE ADULT - SUBJECTIVE AND OBJECTIVE BOX
43 year old man with history of HTN, Obesity, SHANNAN on CPAP. rectal cancer diagnosed on 8/18 s/p chemo and radiation. S/P ileostomy. POD#1 for ileostomy reversal and repair of parastomal hernia using mesh. Hospitalist consulted for medical management of comorbidities.   Chart and meds reviewed. Patient was seen and examined. No acute overnight events, pain is well controlled, no flatus or BM yet. Started on Full liquid diet by CRS.    PAST MEDICAL & SURGICAL HISTORY:  Rectal cancer: completed chemotherapy 2 weeks ago and radiation a month ago. 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 tube in right ear presently      Family History  no 1st deg relative of cancer; maternal 2nd cousins with colon ca  no fam hx CVD      Social HX   lives with wife and 2 kids, ages 5 & 7   used tobacco socially during college years, no since  ETOH/beers 1-2 monthly /special occasions  denies illicit/prescription drug abuse      REVIEW OF SYSTEMS:    CONSTITUTIONAL: No weakness, fevers or chills  EYES/ENT: No visual changes;  No vertigo or throat pain   NECK: No pain or stiffness  RESPIRATORY: No cough, wheezing, hemoptysis; No shortness of breath  CARDIOVASCULAR: No chest pain or palpitations  GASTROINTESTINAL: No abdominal or epigastric pain. No nausea, vomiting, or hematemesis; No diarrhea or constipation. No melena or hematochezia.  GENITOURINARY: No dysuria, frequency or hematuria  NEUROLOGICAL: No numbness or weakness  SKIN: No itching, burning, rashes, or lesions   All other review of systems is negative unless indicated above.    MEDICATIONS  (STANDING):  alvimopan 12 milliGRAM(s) Oral two times a day  heparin  Injectable 5000 Unit(s) SubCutaneous every 8 hours  ketorolac   Injectable 30 milliGRAM(s) IV Push every 6 hours  lisinopril 20 milliGRAM(s) Oral daily  sodium chloride 0.9% lock flush 3 milliLiter(s) IV Push every 8 hours  sodium chloride 0.9% lock flush 3 milliLiter(s) IV Push every 8 hours    MEDICATIONS  (PRN):  acetaminophen   Tablet .. 650 milliGRAM(s) Oral every 6 hours PRN Temp greater or equal to 38C (100.4F), Mild Pain (1 - 3)  melatonin 5 milliGRAM(s) Oral at bedtime PRN Insomnia  ondansetron Injectable 4 milliGRAM(s) IV Push every 6 hours PRN Nausea and/or Vomiting  oxyCODONE    IR 5 milliGRAM(s) Oral every 4 hours PRN Moderate Pain (4 - 6)  oxyCODONE    IR 10 milliGRAM(s) Oral every 4 hours PRN Severe Pain (7 - 10)      Vital Signs Last 24 Hrs  T(C): 36.4 (10 May 2019 04:34), Max: 36.4 (09 May 2019 12:40)  T(F): 97.6 (10 May 2019 04:34), Max: 97.6 (10 May 2019 04:34)  HR: 88 (10 May 2019 04:34) (76 - 91)  BP: 110/62 (10 May 2019 04:34) (100/54 - 120/58)  BP(mean): --  RR: 18 (10 May 2019 04:34) (10 - 18)  SpO2: 98% (10 May 2019 04:34) (93% - 98%)      PE:  Constitutional: NAD, laying in bed, obese  HEENT: NC/AT  Back: no tenderness  Respiratory: respirations even and non labored, LCTA  Cardiovascular: S1S2 regular, no murmurs  Abdomen: soft, not tender, not distended, hypoactive BS. Vac drain in place. Multiple lap sites with dressing. no bleeding noted.   Genitourinary: voiding  Rectal: deferred  Musculoskeletal: no muscle tenderness, no joint swelling or tenderness  Extremities: no pedal edema   Neurological: no focal deficits    05-10    140  |  107  |  19  ----------------------------<  111<H>  4.0   |  27  |  1.02    Ca    8.4<L>      10 May 2019 05:42                          12.9   8.70  )-----------( 139      ( 10 May 2019 05:42 )             37.6 CC - abdominal pain   43 year old man with history of rectal cancer diagnosed on 8/18 s/p chemo and radiation. S/P ileostomy,  HTN, Obesity, SHANNAN on CPAP  POD#1 for ileostomy reversal and repair of parastomal hernia using mesh. Hospitalist consulted for medical management of comorbidities.     5/10 - Chart and meds reviewed. Patient was seen and examined. No acute overnight events, pain is well controlled, no flatus or BM yet. Started on Full liquid diet by CRS.    PAST MEDICAL & SURGICAL HISTORY:  Rectal cancer: completed chemotherapy 2 weeks ago and radiation a month ago. 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 tube in right ear presently  Family History  no 1st deg relative of cancer; maternal 2nd cousins with colon ca  no fam hx CVD  Social HX   lives with wife and 2 kids, ages 5 & 7   used tobacco socially during college years, no since  ETOH/beers 1-2 monthly /special occasions  denies illicit/prescription drug abuse    REVIEW OF SYSTEMS:    CONSTITUTIONAL: No weakness, fevers or chills  EYES/ENT: No visual changes;  No vertigo or throat pain   NECK: No pain or stiffness  RESPIRATORY: No cough, wheezing, hemoptysis; No shortness of breath  CARDIOVASCULAR: No chest pain or palpitations  GASTROINTESTINAL: No abdominal or epigastric pain. No nausea, vomiting, or hematemesis; No diarrhea or constipation. No melena or hematochezia.  GENITOURINARY: No dysuria, frequency or hematuria  NEUROLOGICAL: No numbness or weakness  SKIN: No itching, burning, rashes, or lesions   All other review of systems is negative unless indicated above.    MEDICATIONS  (STANDING):  alvimopan 12 milliGRAM(s) Oral two times a day  heparin  Injectable 5000 Unit(s) SubCutaneous every 8 hours  ketorolac   Injectable 30 milliGRAM(s) IV Push every 6 hours  lisinopril 20 milliGRAM(s) Oral daily  sodium chloride 0.9% lock flush 3 milliLiter(s) IV Push every 8 hours  sodium chloride 0.9% lock flush 3 milliLiter(s) IV Push every 8 hours    MEDICATIONS  (PRN):  acetaminophen   Tablet .. 650 milliGRAM(s) Oral every 6 hours PRN Temp greater or equal to 38C (100.4F), Mild Pain (1 - 3)  melatonin 5 milliGRAM(s) Oral at bedtime PRN Insomnia  ondansetron Injectable 4 milliGRAM(s) IV Push every 6 hours PRN Nausea and/or Vomiting  oxyCODONE    IR 5 milliGRAM(s) Oral every 4 hours PRN Moderate Pain (4 - 6)  oxyCODONE    IR 10 milliGRAM(s) Oral every 4 hours PRN Severe Pain (7 - 10)      Vital Signs Last 24 Hrs  T(C): 36.4 (10 May 2019 04:34), Max: 36.4 (09 May 2019 12:40)  T(F): 97.6 (10 May 2019 04:34), Max: 97.6 (10 May 2019 04:34)  HR: 88 (10 May 2019 04:34) (76 - 91)  BP: 110/62 (10 May 2019 04:34) (100/54 - 120/58)  BP(mean): --  RR: 18 (10 May 2019 04:34) (10 - 18)  SpO2: 98% (10 May 2019 04:34) (93% - 98%)      PE:  Constitutional: NAD, laying in bed, obese  HEENT: NC/AT  Back: no tenderness  Respiratory: respirations even and non labored, LCTA  Cardiovascular: S1S2 regular, no murmurs  Abdomen: soft, not tender, not distended, hypoactive BS. Vac drain in place. Multiple lap sites with dressing. no bleeding noted.   Genitourinary: voiding  Rectal: deferred  Musculoskeletal: no muscle tenderness, no joint swelling or tenderness  Extremities: no pedal edema   Neurological: no focal deficits    05-10    140  |  107  |  19  ----------------------------<  111<H>  4.0   |  27  |  1.02    Ca    8.4<L>      10 May 2019 05:42                          12.9   8.70  )-----------( 139      ( 10 May 2019 05:42 )             37.6     < from: 12 Lead ECG (05.01.19 @ 14:36) >  Normal sinus rhythm  Left axis deviation  Abnormal ECG  When compared with ECG of 16-AUG-2018 08:12,  Minimal  Criteria for Inferior infarct are no longer Present  entricular Rate 85 BPM    Atrial Rate 85 BPM    P-R Interval 176 ms    QRS Duration 92 ms    Q-T Interval 360 ms    QTC Calculation(Bezet) 428 ms    < end of copied text >

## 2019-05-10 NOTE — CONSULT NOTE ADULT - ASSESSMENT
43 year old man with history of HTN, Obesity, SHANNAN on CPAP. rectal cancer diagnosed on 8/18 s/p chemo and radiation. S/P ileostomy. POD#1 for ileostomy reversal and repair of parastomal hernia using mesh. Hospitalist consulted for medical management of comorbidities.   Chart and meds reviewed. Patient was seen and examined. No acute overnight events, pain is well controlled, no flatus or BM yet. Started on Full liquid diet by CRS.        *Rectal cancer - s/p chemo and radiation (patient completed)   s/p ileostomy reversal and laparoscopic repair of hernia  post-op surgical care per primary team  advanced to Full liquid diet as per CRS  pain mgmt per primary team  con't alvimopan  am labs  con't IVF fluids/hydration  encourage ambulation and IS  monitor return of bowel function    *Essential hypertension    BP control  resume home dose ace-i /hctz (20/25) when tolerating po  monitor BP per post-op protocol    *Morbid obesity  Obstructive sleep apnea  CPAP HS (has his own)  healthy wt loss program recommended (reported he has lost 50 lbs since dx with rectal Ca)  nutrition consult for education    Sarcoma    Elective surgery  muscle removed from right foot due in 2005    VTE prophylaxis- Encourage ambulation/ heparin    Case d/w Dr Hong. Plan explained to patient. 43 year old man with history of HTN, Obesity, SHANNAN on CPAP. rectal cancer diagnosed on 8/18 s/p chemo and radiation. S/P ileostomy. POD#1 for ileostomy reversal and repair of parastomal hernia using mesh. Hospitalist consulted for medical management of comorbidities.   Chart and meds reviewed. Patient was seen and examined. No acute overnight events, pain is well controlled, no flatus or BM yet. Started on Full liquid diet by CRS.        *Rectal cancer - s/p chemo and radiation (patient completed)   s/p ileostomy reversal and laparoscopic repair of hernia  post-op surgical care per primary team  advanced to Full liquid diet as per CRS  pain mgmt per primary team  con't alvimopan  am labs  con't IVF fluids/hydration  encourage ambulation and IS  monitor return of bowel function    *Essential hypertension    BP control  c/w lisinopril 20 , hold diuretics  monitor BP per post-op protocol    * Mild post-operative anemia  - monitor    *Morbid obesity  Obstructive sleep apnea  CPAP HS (has his own)  healthy wt loss program recommended (reported he has lost 50 lbs since dx with rectal Ca)  nutrition consult for education    Sarcoma    Elective surgery  muscle removed from right foot due in 2005    VTE prophylaxis- Encourage ambulation/ heparin  Advanced directives - full code, 15 minutes spend    Case d/w Dr Pizano. Plan explained to patient.

## 2019-05-10 NOTE — PROVIDER CONTACT NOTE (OTHER) - ACTION/TREATMENT ORDERED:
Message left for surgical resident re: janine beverly; patient has not passed flatus; awaiting callback

## 2019-05-11 ENCOUNTER — TRANSCRIPTION ENCOUNTER (OUTPATIENT)
Age: 44
End: 2019-05-11

## 2019-05-11 VITALS
HEART RATE: 75 BPM | SYSTOLIC BLOOD PRESSURE: 119 MMHG | RESPIRATION RATE: 18 BRPM | DIASTOLIC BLOOD PRESSURE: 69 MMHG | OXYGEN SATURATION: 97 % | TEMPERATURE: 98 F

## 2019-05-11 RX ORDER — OXYCODONE HYDROCHLORIDE 5 MG/1
1 TABLET ORAL
Qty: 20 | Refills: 0
Start: 2019-05-11

## 2019-05-11 RX ADMIN — LISINOPRIL 20 MILLIGRAM(S): 2.5 TABLET ORAL at 05:54

## 2019-05-11 RX ADMIN — ALVIMOPAN 12 MILLIGRAM(S): 12 CAPSULE ORAL at 17:42

## 2019-05-11 RX ADMIN — Medication 30 MILLIGRAM(S): at 02:28

## 2019-05-11 RX ADMIN — Medication 30 MILLIGRAM(S): at 01:58

## 2019-05-11 RX ADMIN — Medication 650 MILLIGRAM(S): at 08:52

## 2019-05-11 RX ADMIN — ALVIMOPAN 12 MILLIGRAM(S): 12 CAPSULE ORAL at 05:53

## 2019-05-11 RX ADMIN — HEPARIN SODIUM 5000 UNIT(S): 5000 INJECTION INTRAVENOUS; SUBCUTANEOUS at 05:53

## 2019-05-11 RX ADMIN — SODIUM CHLORIDE 3 MILLILITER(S): 9 INJECTION INTRAMUSCULAR; INTRAVENOUS; SUBCUTANEOUS at 12:59

## 2019-05-11 RX ADMIN — SODIUM CHLORIDE 3 MILLILITER(S): 9 INJECTION INTRAMUSCULAR; INTRAVENOUS; SUBCUTANEOUS at 07:21

## 2019-05-11 RX ADMIN — Medication 650 MILLIGRAM(S): at 16:58

## 2019-05-11 RX ADMIN — Medication 30 MILLIGRAM(S): at 07:12

## 2019-05-11 RX ADMIN — Medication 30 MILLIGRAM(S): at 12:56

## 2019-05-11 RX ADMIN — Medication 30 MILLIGRAM(S): at 13:10

## 2019-05-11 RX ADMIN — Medication 650 MILLIGRAM(S): at 09:50

## 2019-05-11 RX ADMIN — Medication 650 MILLIGRAM(S): at 17:42

## 2019-05-11 NOTE — DISCHARGE NOTE NURSING/CASE MANAGEMENT/SOCIAL WORK - NSDCDPATPORTLINK_GEN_ALL_CORE
You can access the [email or web address] Patient Portal, offered by [facility], by registering with the following website: [email or web address]

## 2019-05-11 NOTE — DISCHARGE NOTE PROVIDER - NSDCFUADDINST_GEN_ALL_CORE_FT
- Stay on low fiber diet.   - Okay to shower with soap and water, dap incisions dry and leave open to air. Cover prior ileostomy site with dry gauze and change daily and as needed when saturated   - No heavy lifting (>10 pounds), pushing or straining. Stairs and walking okay.  - Call surgeon if you develop fevers  > 101, persistent nausea or vomiting, worsening abdominal pain, pus draining from incisions or concerns for wound infection.   - Call office to make follow up appointment for 2 weeks.

## 2019-05-11 NOTE — DISCHARGE NOTE PROVIDER - CARE PROVIDER_API CALL
Nehemias Velarde)  ColonRectal Surgery; Surgery  [address] [place] [address]  Phone: [phone]  Fax: [phone]  Follow Up Time:

## 2019-05-11 NOTE — PROGRESS NOTE ADULT - SUBJECTIVE AND OBJECTIVE BOX
CC - abdominal pain   43 year old man with history of rectal cancer diagnosed on 8/18 s/p chemo and radiation. S/P ileostomy,  HTN, Obesity, SHANNAN on CPAP  POD#1 for ileostomy reversal and repair of parastomal hernia using mesh. Hospitalist consulted for medical management of comorbidities.     5/10 - Chart and meds reviewed. Patient was seen and examined. No acute overnight events, pain is well controlled, no flatus or BM yet. Started on Full liquid diet by CRS.  5/11 - pt seen and examined, no events overnight, pain well control, denies dyspnea, some cough, advised to use incentive spirometry   PAST MEDICAL & SURGICAL HISTORY:  Rectal cancer: completed chemotherapy 2 weeks ago and radiation a month ago. 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 tube in right ear presently  Family History  no 1st deg relative of cancer; maternal 2nd cousins with colon ca  no fam hx CVD  Social HX   lives with wife and 2 kids, ages 5 & 7   used tobacco socially during college years, no since  ETOH/beers 1-2 monthly /special occasions  denies illicit/prescription drug abuse    REVIEW OF SYSTEMS:    CONSTITUTIONAL: No weakness, fevers or chills  EYES/ENT: No visual changes;  No vertigo or throat pain   NECK: No pain or stiffness  RESPIRATORY: No cough, wheezing, hemoptysis; No shortness of breath  CARDIOVASCULAR: No chest pain or palpitations  GASTROINTESTINAL: No abdominal or epigastric pain. No nausea, vomiting, or hematemesis; No diarrhea or constipation. No melena or hematochezia.  GENITOURINARY: No dysuria, frequency or hematuria  NEUROLOGICAL: No numbness or weakness  SKIN: No itching, burning, rashes, or lesions   All other review of systems is negative unless indicated above.    MEDICATIONS  (STANDING):  alvimopan 12 milliGRAM(s) Oral two times a day  heparin  Injectable 5000 Unit(s) SubCutaneous every 8 hours  ketorolac   Injectable 30 milliGRAM(s) IV Push every 6 hours  lisinopril 20 milliGRAM(s) Oral daily  sodium chloride 0.9% lock flush 3 milliLiter(s) IV Push every 8 hours  sodium chloride 0.9% lock flush 3 milliLiter(s) IV Push every 8 hours    MEDICATIONS  (PRN):  acetaminophen   Tablet .. 650 milliGRAM(s) Oral every 6 hours PRN Temp greater or equal to 38C (100.4F), Mild Pain (1 - 3)  melatonin 5 milliGRAM(s) Oral at bedtime PRN Insomnia  ondansetron Injectable 4 milliGRAM(s) IV Push every 6 hours PRN Nausea and/or Vomiting  oxyCODONE    IR 5 milliGRAM(s) Oral every 4 hours PRN Moderate Pain (4 - 6)  oxyCODONE    IR 10 milliGRAM(s) Oral every 4 hours PRN Severe Pain (7 - 10)      Vital Signs Last 24 Hrs  T(C): 37.2 (05-11-19 @ 04:18), Max: 37.2 (05-11-19 @ 04:18)  T(F): 98.9 (05-11-19 @ 04:18), Max: 98.9 (05-11-19 @ 04:18)  HR: 92 (05-11-19 @ 04:18) (82 - 92)  BP: 106/61 (05-11-19 @ 04:18) (106/61 - 121/73)  RR: 18 (05-11-19 @ 04:18) (18 - 18)  SpO2: 96% (05-11-19 @ 04:18) (96% - 100%)  Wt(kg): --    PE:  Constitutional: NAD, laying in bed, obese  HEENT: NC/AT  Back: no tenderness  Respiratory: respirations even and non labored, LCTA  Cardiovascular: S1S2 regular, no murmurs  Abdomen: soft, not tender, not distended, hypoactive BS. Vac drain in place. Multiple lap sites with dressing. no bleeding noted.   Genitourinary: voiding  Rectal: deferred  Musculoskeletal: no muscle tenderness, no joint swelling or tenderness  Extremities: no pedal edema   Neurological: no focal deficits    05-10    140  |  107  |  19  ----------------------------<  111<H>  4.0   |  27  |  1.02    Ca    8.4<L>      10 May 2019 05:42                          12.9   8.70  )-----------( 139      ( 10 May 2019 05:42 )             37.6     < from: 12 Lead ECG (05.01.19 @ 14:36) >  Normal sinus rhythm  Left axis deviation  Abnormal ECG  When compared with ECG of 16-AUG-2018 08:12,  Minimal  Criteria for Inferior infarct are no longer Present  entricular Rate 85 BPM    Atrial Rate 85 BPM    P-R Interval 176 ms    QRS Duration 92 ms    Q-T Interval 360 ms    QTC Calculation(Bezet) 428 ms    < end of copied text >

## 2019-05-11 NOTE — DISCHARGE NOTE PROVIDER - NSDCCPCAREPLAN_GEN_ALL_CORE_FT
PRINCIPAL DISCHARGE DIAGNOSIS  Diagnosis: Ileostomy status  Assessment and Plan of Treatment:       SECONDARY DISCHARGE DIAGNOSES  Diagnosis: Parastomal hernia without obstruction or gangrene  Assessment and Plan of Treatment:

## 2019-05-11 NOTE — PROGRESS NOTE ADULT - ASSESSMENT
43 year old man with history of HTN, Obesity, SHANNAN on CPAP. rectal cancer diagnosed on 8/18 s/p chemo and radiation. S/P ileostomy. POD#1 for ileostomy reversal and repair of parastomal hernia using mesh. Hospitalist consulted for medical management of comorbidities.   Chart and meds reviewed. Patient was seen and examined. No acute overnight events, pain is well controlled, no flatus or BM yet. Started on Full liquid diet by CRS.        *Rectal cancer - s/p chemo and radiation (patient completed)   s/p ileostomy reversal and laparoscopic repair of hernia  post-op surgical care per primary team  advance diet as per surgery   pain mgmt per primary team  con't alvimopan  am labs  con't IVF fluids/hydration  encourage ambulation and IS  monitor return of bowel function    *Essential hypertension    BP control  c/w lisinopril 20 , hold diuretics  monitor BP per post-op protocol    * Mild post-operative anemia  - monitor    *Morbid obesity  Obstructive sleep apnea  CPAP HS (has his own)  healthy wt loss program recommended (reported he has lost 50 lbs since dx with rectal Ca)  nutrition consult for education    Sarcoma    Elective surgery  muscle removed from right foot due in 2005    VTE prophylaxis- Encourage ambulation/ heparin  Advanced directives - full code, 15 minutes spend    Dispo - as per surgery

## 2019-05-11 NOTE — PROGRESS NOTE ADULT - ASSESSMENT
POD 2 s/p ileostomy reversal and parastomal hernia repair. Doing well    Advanced to solids. Prevena removed. If doing well later today, discharge home.

## 2019-05-11 NOTE — PROGRESS NOTE ADULT - SUBJECTIVE AND OBJECTIVE BOX
Feels well today. Pain well controlled. Had BMs. Reports some distension    Exam:  Vital Signs Last 24 Hrs  T(C): 37.2 (11 May 2019 04:18), Max: 37.2 (11 May 2019 04:18)  T(F): 98.9 (11 May 2019 04:18), Max: 98.9 (11 May 2019 04:18)  HR: 92 (11 May 2019 04:18) (82 - 102)  BP: 106/61 (11 May 2019 04:18) (106/61 - 135/88)  RR: 18 (11 May 2019 04:18) (18 - 18)  SpO2: 96% (11 May 2019 04:18) (96% - 100%)    In no distress  Non labored breathing  Abdomen soft, appropriately tender, incisions clean and intact with staples. SS drainage from prior ileostomy site  Alert and oriented x 3                          12.9   8.70  )-----------( 139      ( 10 May 2019 05:42 )             37.6   05-10    140  |  107  |  19  ----------------------------<  111<H>  4.0   |  27  |  1.02    Ca    8.4<L>      10 May 2019 05:42

## 2019-05-11 NOTE — DISCHARGE NOTE PROVIDER - HOSPITAL COURSE
44 year old male with a history of rectal cancer s/p low anterior resection with ileostomy who was admitted to the hospital for ileostomy reversal. he underwent laparoscopic ileostomy reversal and parastomal hernia repair with Surgimend mesh. His post operative course was unremarkable. He was tolerating diet and having bowel functio and was clinically stable for discharge on POD 2

## 2019-05-13 LAB — SURGICAL PATHOLOGY STUDY: SIGNIFICANT CHANGE UP

## 2019-05-15 DIAGNOSIS — G47.33 OBSTRUCTIVE SLEEP APNEA (ADULT) (PEDIATRIC): ICD-10-CM

## 2019-05-15 DIAGNOSIS — C19 MALIGNANT NEOPLASM OF RECTOSIGMOID JUNCTION: ICD-10-CM

## 2019-05-15 DIAGNOSIS — I10 ESSENTIAL (PRIMARY) HYPERTENSION: ICD-10-CM

## 2019-05-15 DIAGNOSIS — D64.9 ANEMIA, UNSPECIFIED: ICD-10-CM

## 2019-05-15 DIAGNOSIS — K43.5 PARASTOMAL HERNIA WITHOUT OBSTRUCTION OR GANGRENE: ICD-10-CM

## 2019-05-15 DIAGNOSIS — Z85.048 PERSONAL HISTORY OF OTHER MALIGNANT NEOPLASM OF RECTUM, RECTOSIGMOID JUNCTION, AND ANUS: ICD-10-CM

## 2019-05-15 DIAGNOSIS — E66.01 MORBID (SEVERE) OBESITY DUE TO EXCESS CALORIES: ICD-10-CM

## 2019-05-15 DIAGNOSIS — Z43.2 ENCOUNTER FOR ATTENTION TO ILEOSTOMY: ICD-10-CM

## 2019-05-28 ENCOUNTER — APPOINTMENT (OUTPATIENT)
Dept: COLORECTAL SURGERY | Facility: CLINIC | Age: 44
End: 2019-05-28
Payer: COMMERCIAL

## 2019-05-28 VITALS
HEART RATE: 80 BPM | BODY MASS INDEX: 39.17 KG/M2 | RESPIRATION RATE: 14 BRPM | WEIGHT: 315 LBS | HEIGHT: 75 IN | DIASTOLIC BLOOD PRESSURE: 92 MMHG | TEMPERATURE: 98 F | SYSTOLIC BLOOD PRESSURE: 141 MMHG

## 2019-05-28 DIAGNOSIS — Z98.890 OTHER SPECIFIED POSTPROCEDURAL STATES: ICD-10-CM

## 2019-05-28 PROCEDURE — 99024 POSTOP FOLLOW-UP VISIT: CPT

## 2019-05-28 PROCEDURE — 17250 CHEM CAUT OF GRANLTJ TISSUE: CPT

## 2019-05-28 NOTE — HISTORY OF PRESENT ILLNESS
[FreeTextEntry1] : No c/o.\gibson Reports multiple BMs daily, particularly with help of stool softeners.\par Is adhering to a regular diet, however has not increased daily fiber intake.\par Concerned that he at times requires straining to evacuate stools when not using stool softeners.

## 2019-05-28 NOTE — ASSESSMENT
[FreeTextEntry1] : Doing well, reassured.\par Increase daily fiber intake, continue stool softener.\par Recommend miralax nightly rather than dulcolax.\par RTO in 1 month.

## 2019-06-25 ENCOUNTER — APPOINTMENT (OUTPATIENT)
Dept: COLORECTAL SURGERY | Facility: CLINIC | Age: 44
End: 2019-06-25
Payer: COMMERCIAL

## 2019-06-25 DIAGNOSIS — K94.13 ENTEROSTOMY MALFUNCTION: ICD-10-CM

## 2019-06-25 DIAGNOSIS — Z71.89 OTHER SPECIFIED COUNSELING: ICD-10-CM

## 2019-06-25 DIAGNOSIS — T81.89XA OTHER COMPLICATIONS OF PROCEDURES, NOT ELSEWHERE CLASSIFIED, INITIAL ENCOUNTER: ICD-10-CM

## 2019-06-25 PROCEDURE — 99024 POSTOP FOLLOW-UP VISIT: CPT

## 2019-06-27 PROBLEM — K94.13 ILEOSTOMY DYSFUNCTION: Status: RESOLVED | Noted: 2019-02-20 | Resolved: 2019-06-27

## 2019-06-28 PROBLEM — Z71.89 ENCOUNTER FOR OSTOMY CARE EDUCATION: Status: RESOLVED | Noted: 2018-12-05 | Resolved: 2019-06-28

## 2019-06-28 PROBLEM — T81.89XA NON-HEALING SURGICAL WOUND: Status: RESOLVED | Noted: 2019-01-07 | Resolved: 2019-06-28

## 2019-06-30 NOTE — ASSESSMENT
[FreeTextEntry1] : 44-year-old male obese with history of rectal cancer and recent ileostomy reversal. Recommend CAT scan of abdomen and pelvis with p.o. IV and rectal contrast rule out anastomotic stricture versus leak

## 2019-06-30 NOTE — PHYSICAL EXAM
[Abdomen Masses] : No abdominal masses [Abdomen Tenderness] : ~T No ~M abdominal tenderness [Tender] : nontender [de-identified] : Obese Well-healed incisions

## 2019-06-30 NOTE — HISTORY OF PRESENT ILLNESS
[FreeTextEntry1] : 44-year-old male with recent reversal of ileostomy his rectal cancer. Having complaints of abdominal pain feeling bloated difficulties evacuating bowel movements. Denies any fevers or chills nausea or vomiting

## 2019-12-03 ENCOUNTER — APPOINTMENT (OUTPATIENT)
Dept: COLORECTAL SURGERY | Facility: CLINIC | Age: 44
End: 2019-12-03
Payer: COMMERCIAL

## 2019-12-03 VITALS
DIASTOLIC BLOOD PRESSURE: 89 MMHG | HEIGHT: 75 IN | RESPIRATION RATE: 14 BRPM | HEART RATE: 94 BPM | BODY MASS INDEX: 39.17 KG/M2 | WEIGHT: 315 LBS | SYSTOLIC BLOOD PRESSURE: 142 MMHG

## 2019-12-03 DIAGNOSIS — K94.09 OTHER COMPLICATIONS OF COLOSTOMY: ICD-10-CM

## 2019-12-03 PROCEDURE — 99215 OFFICE O/P EST HI 40 MIN: CPT

## 2019-12-04 PROBLEM — K94.09 COLOSTOMY HERNIA: Status: ACTIVE | Noted: 2019-12-04

## 2019-12-04 NOTE — ASSESSMENT
[FreeTextEntry1] : 44-year-old male morbidly obese with history of rectal cancer and recent ileostomy reversal, With abdominal wall incisional hernia.Due to his morbid obesity repairing the hernia carries an extremely high-grade of recurrence. I recommend that he lose weight approximately 6-8 pounds before any hernia surgery. He may need bariatric surgery to accomplish this. Also needs a followup colonoscopy even his rectal cancer history. Recommend colonoscopy. Risks and benefits of colonoscopy have been discussed which include but not limited to bleeding, perforation, missing a cancer or polyp occurring 5%.\par

## 2019-12-04 NOTE — HISTORY OF PRESENT ILLNESS
[FreeTextEntry1] : 44-year-old Morbidly obese male with recent reversal of ileostomy for rectal cancer. Having complaints of abdominal Incisional hernia, no pain Denies any fevers or chills nausea or vomiting

## 2019-12-04 NOTE — PHYSICAL EXAM
[Abdomen Masses] : No abdominal masses [Tender] : nontender [Abdomen Tenderness] : ~T No ~M abdominal tenderness [Normal Breath Sounds] : Normal breath sounds [JVD] : no jugular venous distention  [Normal Heart Sounds] : normal heart sounds [Normal Rate and Rhythm] : normal rate and rhythm [Alert] : alert [No Rash or Lesion] : No rash or lesion [Oriented to Person] : oriented to person [Oriented to Time] : oriented to time [Calm] : calm [Oriented to Place] : oriented to place [de-identified] : pupils equal reactive to light normocephalic atraumatic. [de-identified] : Morbidly Obese Well-healed incisions, Right-sided incisional hernia reducible [de-identified] : Looks well in no distress, of stated age. [de-identified] : moves all 4 extremities appropriately with 5 over 5 strength

## 2020-06-17 NOTE — ASU PATIENT PROFILE, ADULT - ACCEPTABLE
0 Pain Refusal Text: I offered to prescribe pain medication but the patient refused to take this medication.

## 2020-12-02 ENCOUNTER — APPOINTMENT (OUTPATIENT)
Dept: COLORECTAL SURGERY | Facility: CLINIC | Age: 45
End: 2020-12-02
Payer: COMMERCIAL

## 2020-12-02 VITALS
WEIGHT: 315 LBS | HEART RATE: 82 BPM | SYSTOLIC BLOOD PRESSURE: 166 MMHG | HEIGHT: 75 IN | BODY MASS INDEX: 39.17 KG/M2 | DIASTOLIC BLOOD PRESSURE: 106 MMHG | TEMPERATURE: 97.2 F | RESPIRATION RATE: 14 BRPM

## 2020-12-02 PROCEDURE — 99214 OFFICE O/P EST MOD 30 MIN: CPT

## 2020-12-02 PROCEDURE — 99072 ADDL SUPL MATRL&STAF TM PHE: CPT

## 2020-12-02 NOTE — HISTORY OF PRESENT ILLNESS
[FreeTextEntry1] : 45-year-old Morbidly obese male with recent reversal of ileostomy for rectal cancer. Overall doing well denies any pain changes in bowel habits or bleeding. Incisional hernia is stable

## 2020-12-02 NOTE — PHYSICAL EXAM
[Abdomen Masses] : No abdominal masses [Abdomen Tenderness] : ~T No ~M abdominal tenderness [Tender] : nontender [JVD] : no jugular venous distention  [Normal Breath Sounds] : Normal breath sounds [Normal Heart Sounds] : normal heart sounds [Normal Rate and Rhythm] : normal rate and rhythm [No Rash or Lesion] : No rash or lesion [Alert] : alert [Oriented to Person] : oriented to person [Oriented to Place] : oriented to place [Oriented to Time] : oriented to time [Calm] : calm [de-identified] : Morbidly Obese Well-healed incisions, Right-sided incisional hernia reducible [de-identified] : Looks well in no distress, of stated age. [de-identified] : pupils equal reactive to light normocephalic atraumatic. [de-identified] : moves all 4 extremities appropriately with 5 over 5 strength

## 2020-12-02 NOTE — ASSESSMENT
[FreeTextEntry1] : 45-year-old male morbidly obese with history of rectal cancer and recent ileostomy reversal, With abdominal wall incisional hernia. Risks and benefits of colonoscopy have been discussed which include but not limited to bleeding, perforation, missing a cancer or polyp occurring 5%.Also recommend EGD\par

## 2020-12-13 DIAGNOSIS — Z01.818 ENCOUNTER FOR OTHER PREPROCEDURAL EXAMINATION: ICD-10-CM

## 2020-12-14 ENCOUNTER — APPOINTMENT (OUTPATIENT)
Dept: DISASTER EMERGENCY | Facility: CLINIC | Age: 45
End: 2020-12-14

## 2020-12-15 LAB — SARS-COV-2 N GENE NPH QL NAA+PROBE: NOT DETECTED

## 2020-12-17 ENCOUNTER — RESULT REVIEW (OUTPATIENT)
Age: 45
End: 2020-12-17

## 2020-12-17 ENCOUNTER — APPOINTMENT (OUTPATIENT)
Dept: GASTROENTEROLOGY | Facility: AMBULATORY MEDICAL SERVICES | Age: 45
End: 2020-12-17
Payer: COMMERCIAL

## 2020-12-17 ENCOUNTER — APPOINTMENT (OUTPATIENT)
Dept: COLORECTAL SURGERY | Facility: CLINIC | Age: 45
End: 2020-12-17
Payer: COMMERCIAL

## 2020-12-17 PROCEDURE — 43239 EGD BIOPSY SINGLE/MULTIPLE: CPT

## 2020-12-17 PROCEDURE — 45378 DIAGNOSTIC COLONOSCOPY: CPT

## 2021-03-31 ENCOUNTER — NON-APPOINTMENT (OUTPATIENT)
Age: 46
End: 2021-03-31

## 2021-03-31 ENCOUNTER — APPOINTMENT (OUTPATIENT)
Dept: OTOLARYNGOLOGY | Facility: CLINIC | Age: 46
End: 2021-03-31
Payer: COMMERCIAL

## 2021-03-31 VITALS
SYSTOLIC BLOOD PRESSURE: 137 MMHG | TEMPERATURE: 97.9 F | DIASTOLIC BLOOD PRESSURE: 82 MMHG | HEIGHT: 75 IN | BODY MASS INDEX: 39.17 KG/M2 | WEIGHT: 315 LBS | HEART RATE: 86 BPM

## 2021-03-31 DIAGNOSIS — H61.21 IMPACTED CERUMEN, RIGHT EAR: ICD-10-CM

## 2021-03-31 PROCEDURE — 99204 OFFICE O/P NEW MOD 45 MIN: CPT | Mod: 25

## 2021-03-31 PROCEDURE — 69210 REMOVE IMPACTED EAR WAX UNI: CPT | Mod: RT

## 2021-03-31 PROCEDURE — 99072 ADDL SUPL MATRL&STAF TM PHE: CPT

## 2021-03-31 RX ORDER — FLUOCINOLONE ACETONIDE 0.25 MG/G
0.03 OINTMENT TOPICAL TWICE DAILY
Qty: 1 | Refills: 2 | Status: ACTIVE | COMMUNITY
Start: 2021-03-31 | End: 1900-01-01

## 2021-03-31 RX ORDER — OFLOXACIN OTIC 3 MG/ML
0.3 SOLUTION AURICULAR (OTIC) TWICE DAILY
Qty: 1 | Refills: 2 | Status: ACTIVE | COMMUNITY
Start: 2021-03-31 | End: 1900-01-01

## 2021-03-31 NOTE — REASON FOR VISIT
[Initial Consultation] : an initial consultation for [FreeTextEntry2] : c/o noise in left ear and headaches

## 2021-03-31 NOTE — PHYSICAL EXAM
[Midline] : trachea located in midline position [Normal] : no rashes [de-identified] : right impacted cerumen; some drainage in eac; left ear drainage - granulation /polyp left eac/ superior tm  - also some eczematiod change of eac bilat  [de-identified] : tube in ear superiorly; some drainage; left ear granulation superior tm

## 2021-03-31 NOTE — PHYSICAL EXAM
[Midline] : trachea located in midline position [Normal] : no rashes [de-identified] : right impacted cerumen; some drainage in eac; left ear drainage - granulation /polyp left eac/ superior tm  - also some eczematiod change of eac bilat  [de-identified] : tube in ear superiorly; some drainage; left ear granulation superior tm

## 2021-03-31 NOTE — ASSESSMENT
[FreeTextEntry1] : Patient with cerumen in left ear and bilat OE with hx of chronic OME.  Ears debrided - tube in right ear - patient states it has been in for over 30years.  Left ear has granulation /polyp superior tm/ear canal - ? cholesteatoma.  Also mild eczema of eac noted bilaterally.\par Recommended strict dry ear precautions and started on ofloxin drops. Also given flucinolone ointment for ear itch.\par When ears clear will get audio and likely CT of TB and patient will likely need otology eval.

## 2021-03-31 NOTE — HISTORY OF PRESENT ILLNESS
[de-identified] : Several mos hx of left ear discomfort.  Told of sinus issues last year.  C/o clogged left ear.  No prior ear problems.  hx of cerumen for years.  \par C/o freq headaches - noted on top of head.  Did have anaphylaxis after second vaccine for COVID.   - got second shot 3 weeks ago - pfizer .

## 2021-03-31 NOTE — REVIEW OF SYSTEMS
[Ear Itch] : ear itch [Hearing Loss] : hearing loss [Ear Drainage] : ear drainage [Negative] : Heme/Lymph

## 2021-03-31 NOTE — HISTORY OF PRESENT ILLNESS
[de-identified] : Several mos hx of left ear discomfort.  Told of sinus issues last year.  C/o clogged left ear.  No prior ear problems.  hx of cerumen for years.  \par C/o freq headaches - noted on top of head.  Did have anaphylaxis after second vaccine for COVID.   - got second shot 3 weeks ago - pfizer .

## 2021-04-13 ENCOUNTER — APPOINTMENT (OUTPATIENT)
Dept: OTOLARYNGOLOGY | Facility: CLINIC | Age: 46
End: 2021-04-13
Payer: COMMERCIAL

## 2021-04-13 VITALS
WEIGHT: 315 LBS | HEART RATE: 86 BPM | SYSTOLIC BLOOD PRESSURE: 137 MMHG | TEMPERATURE: 98.1 F | DIASTOLIC BLOOD PRESSURE: 82 MMHG | HEIGHT: 75 IN | BODY MASS INDEX: 39.17 KG/M2

## 2021-04-13 DIAGNOSIS — E04.1 NONTOXIC SINGLE THYROID NODULE: ICD-10-CM

## 2021-04-13 PROCEDURE — 92567 TYMPANOMETRY: CPT

## 2021-04-13 PROCEDURE — 99214 OFFICE O/P EST MOD 30 MIN: CPT

## 2021-04-13 PROCEDURE — 92557 COMPREHENSIVE HEARING TEST: CPT

## 2021-04-13 PROCEDURE — 99072 ADDL SUPL MATRL&STAF TM PHE: CPT

## 2021-04-13 NOTE — HISTORY OF PRESENT ILLNESS
[de-identified] : Here for follow up of OE.  Also concerned about 8mm thyroid nodule left nodule on prior exam by ZP - had findings of possible cholesteatoma in left ear on 2019 IAC films.   Did see Dr. Vega regarding possible cholesteatoma - had recommended surgery at that time .   (Patient does have hx of colon cancer -doing well)

## 2021-04-13 NOTE — ASSESSMENT
[FreeTextEntry1] : Patient here for follow  up of OE.  Had findings suspicious for left cholesteatoma and prior CT [id number] did show likely cholesteatoma.  Ears clearer now but still granulation tissue in left ear and old tube in right tm.  \par Recommended repeat CT of TB and will have patient follow up with Dr Dye for possible surgery in left ear.\par Also has 8mm thyroid nodule noted on recent PET scan - discussed sono of thyroid however patient is scheduled to see endocrinologist and will have w/u with that physician.

## 2021-04-13 NOTE — DATA REVIEWED
[de-identified] : Large conductive hearing loss left ear.  with some snhl as well   [de-identified] : reviewed CT of IAC from z 2019 - possible left cholesteatoma \par Also reviewed MRI which showed 8mm thyroid nodule done 4/2021

## 2021-04-13 NOTE — PHYSICAL EXAM
[Midline] : trachea located in midline position [Normal] : no rashes [de-identified] : ear improved - sl debris both eac suctioned and ba instiolled  [de-identified] : tube in right ear superiorly; some drainage; left ear granulation superior tm sl improved

## 2021-05-10 ENCOUNTER — APPOINTMENT (OUTPATIENT)
Dept: OTOLARYNGOLOGY | Facility: CLINIC | Age: 46
End: 2021-05-10
Payer: COMMERCIAL

## 2021-05-10 VITALS
SYSTOLIC BLOOD PRESSURE: 121 MMHG | HEART RATE: 86 BPM | DIASTOLIC BLOOD PRESSURE: 78 MMHG | TEMPERATURE: 97.8 F | BODY MASS INDEX: 39.17 KG/M2 | WEIGHT: 315 LBS | HEIGHT: 75 IN

## 2021-05-10 DIAGNOSIS — H60.393 OTHER INFECTIVE OTITIS EXTERNA, BILATERAL: ICD-10-CM

## 2021-05-10 DIAGNOSIS — H71.92 UNSPECIFIED CHOLESTEATOMA, LEFT EAR: ICD-10-CM

## 2021-05-10 DIAGNOSIS — H90.3 SENSORINEURAL HEARING LOSS, BILATERAL: ICD-10-CM

## 2021-05-10 DIAGNOSIS — L92.9 GRANULOMATOUS DISORDER OF THE SKIN AND SUBCUTANEOUS TISSUE, UNSPECIFIED: ICD-10-CM

## 2021-05-10 PROCEDURE — 69210 REMOVE IMPACTED EAR WAX UNI: CPT

## 2021-05-10 PROCEDURE — 99214 OFFICE O/P EST MOD 30 MIN: CPT | Mod: 25

## 2021-05-10 PROCEDURE — 99072 ADDL SUPL MATRL&STAF TM PHE: CPT

## 2021-05-11 NOTE — PROCEDURE
[FreeTextEntry3] : Procedure note:  Bilateral cerumenectomy\par \par May 10, 2021 \par \par Description of Procedure:   Bilateral cerumen impactions were noted requiring debridement under the operating microscope using otologic instrumentation.  The patient tolerated the procedure without complications.\par \par

## 2021-05-11 NOTE — PHYSICAL EXAM
IV Contrast- Discharge Instructions After Your CT Scan      The IV contrast you received today will be filtered from your bloodstream by your kidneys during the next 24 hours and pass from the body in urine.  You will not be aware of this process and your urine will not change in color.  To help this process you should drink at least 4 additional glasses of water or juice today.  This reduces stress on your kidneys.    Most contrast reactions are immediate.  Should you develop symptoms of concern after discharge, contact the department at the number below.  After hours you should contact your personal physician.  If you develop breathing distress or wheezing, call 911.      1.  Has the patient had a previous reaction to IV contrast? n    2.  Does the patient have kidney disease? n    3.  Is the patient on dialysis? n    If YES to any of these questions, exam will be reviewed with a Radiologist before administering contrast.       [Binocular Microscopic Exam] : Binocular microscopic exam was performed [Normal] : no rashes [de-identified] : fariba [de-identified] : retraction pocket with debris

## 2021-05-11 NOTE — HISTORY OF PRESENT ILLNESS
[de-identified] : 46M referred by Dr. Saeed for suspected Left cholesteatoma.  Had previously seen Dr. Villegas, where surgery was recommended but deferred because Pt. had a Dx of colon cancer at that time and needed several surgeries, chemo and RT.  Pt. with hearing loss, but denies otalgia, otorrhea, headaches or dizziness.\par

## 2021-05-25 NOTE — DIETITIAN INITIAL EVALUATION ADULT. - TIME FRAME
Body Location Override (Optional - Billing Will Still Be Based On Selected Body Map Location If Applicable): right posterior shoulder 5mo

## 2021-06-16 ENCOUNTER — APPOINTMENT (OUTPATIENT)
Dept: OPHTHALMOLOGY | Facility: CLINIC | Age: 46
End: 2021-06-16

## 2021-06-17 ENCOUNTER — APPOINTMENT (OUTPATIENT)
Dept: OPHTHALMOLOGY | Facility: CLINIC | Age: 46
End: 2021-06-17

## 2021-07-07 ENCOUNTER — APPOINTMENT (OUTPATIENT)
Dept: COLORECTAL SURGERY | Facility: CLINIC | Age: 46
End: 2021-07-07
Payer: COMMERCIAL

## 2021-07-07 VITALS
DIASTOLIC BLOOD PRESSURE: 94 MMHG | HEIGHT: 75 IN | RESPIRATION RATE: 16 BRPM | BODY MASS INDEX: 39.17 KG/M2 | SYSTOLIC BLOOD PRESSURE: 135 MMHG | WEIGHT: 315 LBS | HEART RATE: 89 BPM | TEMPERATURE: 97.2 F

## 2021-07-07 DIAGNOSIS — K43.2 INCISIONAL HERNIA W/OUT OBSTRUCTION OR GANGRENE: ICD-10-CM

## 2021-07-07 PROCEDURE — 99214 OFFICE O/P EST MOD 30 MIN: CPT

## 2021-07-08 PROBLEM — K43.2 INCISIONAL HERNIA: Status: ACTIVE | Noted: 2019-12-04

## 2021-07-19 ENCOUNTER — APPOINTMENT (OUTPATIENT)
Dept: OTOLARYNGOLOGY | Facility: CLINIC | Age: 46
End: 2021-07-19
Payer: COMMERCIAL

## 2021-07-19 VITALS
DIASTOLIC BLOOD PRESSURE: 94 MMHG | TEMPERATURE: 98 F | WEIGHT: 315 LBS | SYSTOLIC BLOOD PRESSURE: 135 MMHG | HEART RATE: 89 BPM | BODY MASS INDEX: 39.17 KG/M2 | HEIGHT: 75 IN

## 2021-07-19 DIAGNOSIS — H71.91 UNSPECIFIED CHOLESTEATOMA, RIGHT EAR: ICD-10-CM

## 2021-07-19 DIAGNOSIS — H66.90 OTITIS MEDIA, UNSPECIFIED, UNSPECIFIED EAR: ICD-10-CM

## 2021-07-19 DIAGNOSIS — H60.543 ACUTE ECZEMATOID OTITIS EXTERNA, BILATERAL: ICD-10-CM

## 2021-07-19 DIAGNOSIS — H61.23 IMPACTED CERUMEN, BILATERAL: ICD-10-CM

## 2021-07-19 PROCEDURE — 99214 OFFICE O/P EST MOD 30 MIN: CPT | Mod: 25

## 2021-07-19 PROCEDURE — 69210 REMOVE IMPACTED EAR WAX UNI: CPT

## 2021-07-19 RX ORDER — FLUOCINOLONE ACETONIDE 0.25 MG/G
0.03 OINTMENT TOPICAL TWICE DAILY
Qty: 1 | Refills: 1 | Status: ACTIVE | COMMUNITY
Start: 2021-07-19 | End: 1900-01-01

## 2021-07-19 NOTE — PHYSICAL EXAM
[de-identified] : cerumen removed bilat  [de-identified] : retraction of left tm ; right ear normal  [Midline] : trachea located in midline position [Normal] : no rashes

## 2021-07-19 NOTE — HISTORY OF PRESENT ILLNESS
[de-identified] : c/o itchy ears - and feels ears clogged.  Ears feel clogged at time but now improved.  No complaints now.

## 2021-07-19 NOTE — ASSESSMENT
[FreeTextEntry1] : Patient s/p prior ear surgery with itch in ears.  Has had surgery in right ear.  Bilateral cerumen removed and patietn does have eczema of eac .  No infection at this time.  Recommended flucinolone ointment for ear itch and follow up in 4-6 mos and as necessary

## 2021-08-27 ENCOUNTER — APPOINTMENT (OUTPATIENT)
Dept: OTOLARYNGOLOGY | Facility: CLINIC | Age: 46
End: 2021-08-27
Payer: COMMERCIAL

## 2021-08-27 VITALS
HEIGHT: 75 IN | DIASTOLIC BLOOD PRESSURE: 97 MMHG | HEART RATE: 91 BPM | WEIGHT: 315 LBS | BODY MASS INDEX: 39.17 KG/M2 | SYSTOLIC BLOOD PRESSURE: 148 MMHG

## 2021-08-27 DIAGNOSIS — H60.90 UNSPECIFIED OTITIS EXTERNA, UNSPECIFIED EAR: ICD-10-CM

## 2021-08-27 DIAGNOSIS — H66.90 OTITIS MEDIA, UNSPECIFIED, UNSPECIFIED EAR: ICD-10-CM

## 2021-08-27 DIAGNOSIS — J31.0 CHRONIC RHINITIS: ICD-10-CM

## 2021-08-27 PROCEDURE — 99213 OFFICE O/P EST LOW 20 MIN: CPT

## 2021-08-27 RX ORDER — AZITHROMYCIN 250 MG/1
250 TABLET, FILM COATED ORAL
Qty: 1 | Refills: 2 | Status: ACTIVE | COMMUNITY
Start: 2021-08-27 | End: 1900-01-01

## 2021-08-27 RX ORDER — CIPROFLOXACIN AND DEXAMETHASONE 3; 1 MG/ML; MG/ML
0.3-0.1 SUSPENSION/ DROPS AURICULAR (OTIC)
Qty: 1 | Refills: 2 | Status: ACTIVE | COMMUNITY
Start: 2021-08-27 | End: 1900-01-01

## 2021-08-27 RX ORDER — COLISTIN SULFATE, NEOMYCIN SULFATE, THONZONIUM BROMIDE AND HYDROCORTISONE ACETATE 3; 3.3; .5; 1 MG/ML; MG/ML; MG/ML; MG/ML
3.3-3-10-0.5 SUSPENSION AURICULAR (OTIC) 4 TIMES DAILY
Qty: 1 | Refills: 1 | Status: ACTIVE | COMMUNITY
Start: 2021-08-27 | End: 1900-01-01

## 2021-08-27 NOTE — HISTORY OF PRESENT ILLNESS
[de-identified] : BOTH EAR DICOMFORT FOLLOWING RECENT NASAL CONGESTION\par SIMILAR EPISODES IN THE PAST\par OTOLOGIC HX REVIEWED\par COVID VACCINATION UP TO DATE\par ON CPAP\par OBESITY

## 2021-08-27 NOTE — REASON FOR VISIT
[Subsequent Evaluation] : a subsequent evaluation for [FreeTextEntry2] : Patient here to check on ear

## 2021-08-27 NOTE — PHYSICAL EXAM
[FreeTextEntry1] : OBESE [de-identified] : RM CANAL IRRITATION WITH MORE DISCHARGE ON THE RIGHT [de-identified] : LEFT TM RETRACTION/ RIGHT TM THICKENED [Normal] : mucosa is normal [Midline] : trachea located in midline position

## 2021-08-30 RX ORDER — OFLOXACIN OTIC 3 MG/ML
0.3 SOLUTION AURICULAR (OTIC) TWICE DAILY
Qty: 1 | Refills: 0 | Status: ACTIVE | COMMUNITY
Start: 2021-08-30 | End: 1900-01-01

## 2021-09-13 ENCOUNTER — APPOINTMENT (OUTPATIENT)
Dept: DISASTER EMERGENCY | Facility: CLINIC | Age: 46
End: 2021-09-13

## 2021-09-13 ENCOUNTER — APPOINTMENT (OUTPATIENT)
Dept: OTOLARYNGOLOGY | Facility: CLINIC | Age: 46
End: 2021-09-13
Payer: COMMERCIAL

## 2021-09-13 VITALS
HEIGHT: 75 IN | HEART RATE: 91 BPM | BODY MASS INDEX: 39.17 KG/M2 | WEIGHT: 315 LBS | SYSTOLIC BLOOD PRESSURE: 140 MMHG | TEMPERATURE: 98 F | DIASTOLIC BLOOD PRESSURE: 89 MMHG

## 2021-09-13 DIAGNOSIS — H61.22 IMPACTED CERUMEN, LEFT EAR: ICD-10-CM

## 2021-09-13 DIAGNOSIS — H66.91 OTITIS MEDIA, UNSPECIFIED, RIGHT EAR: ICD-10-CM

## 2021-09-13 PROCEDURE — 99213 OFFICE O/P EST LOW 20 MIN: CPT | Mod: 25

## 2021-09-13 PROCEDURE — G0268 REMOVAL OF IMPACTED WAX MD: CPT

## 2021-09-13 RX ORDER — CEPHALEXIN 500 MG/1
500 TABLET ORAL
Qty: 28 | Refills: 0 | Status: ACTIVE | COMMUNITY
Start: 2021-04-25

## 2021-09-13 RX ORDER — MELOXICAM 7.5 MG/1
7.5 TABLET ORAL
Qty: 30 | Refills: 0 | Status: ACTIVE | COMMUNITY
Start: 2021-07-13

## 2021-09-13 RX ORDER — SULFAMETHOXAZOLE AND TRIMETHOPRIM 800; 160 MG/1; MG/1
800-160 TABLET ORAL
Qty: 20 | Refills: 0 | Status: ACTIVE | COMMUNITY
Start: 2021-05-27

## 2021-09-13 RX ORDER — ERGOCALCIFEROL 1.25 MG/1
1.25 MG CAPSULE, LIQUID FILLED ORAL
Qty: 8 | Refills: 0 | Status: ACTIVE | COMMUNITY
Start: 2021-04-28

## 2021-09-13 RX ORDER — LISINOPRIL AND HYDROCHLOROTHIAZIDE TABLETS 20; 25 MG/1; MG/1
20-25 TABLET ORAL
Qty: 90 | Refills: 0 | Status: ACTIVE | COMMUNITY
Start: 2021-09-02

## 2021-09-13 RX ORDER — METHYLPREDNISOLONE 4 MG/1
4 TABLET ORAL
Qty: 21 | Refills: 0 | Status: ACTIVE | COMMUNITY
Start: 2021-07-13

## 2021-09-13 RX ORDER — INDOMETHACIN 50 MG/1
50 CAPSULE ORAL
Qty: 12 | Refills: 0 | Status: ACTIVE | COMMUNITY
Start: 2021-08-15

## 2021-09-13 NOTE — ASSESSMENT
[FreeTextEntry1] : Right ear infection improved.  Slight inflammation around tube in right ear. left ear clear today except for cerumen.  Recommended continue ofloxin drops for 3-4 more days and continue with dry ear precautions.  Follow up in 3 mos and as necessary.

## 2021-09-13 NOTE — PHYSICAL EXAM
[de-identified] : cerumen removed left ear  [de-identified] : tube in anterior right tm - sl inflammation noted - ear suctioned  [Midline] : trachea located in midline position [Normal] : no rashes

## 2021-09-13 NOTE — HISTORY OF PRESENT ILLNESS
[de-identified] : Was seen by Dr Ruiz several weeks ago. Here for recheck of riight ear.  Feels iomproved at thist time. Has tube in right ear.

## 2021-09-14 LAB — SARS-COV-2 N GENE NPH QL NAA+PROBE: NOT DETECTED

## 2021-09-16 ENCOUNTER — APPOINTMENT (OUTPATIENT)
Dept: COLORECTAL SURGERY | Facility: AMBULATORY MEDICAL SERVICES | Age: 46
End: 2021-09-16
Payer: COMMERCIAL

## 2021-09-16 PROCEDURE — 45378 DIAGNOSTIC COLONOSCOPY: CPT

## 2021-09-23 ENCOUNTER — RESULT REVIEW (OUTPATIENT)
Age: 46
End: 2021-09-23

## 2021-09-23 ENCOUNTER — OUTPATIENT (OUTPATIENT)
Dept: OUTPATIENT SERVICES | Facility: HOSPITAL | Age: 46
LOS: 1 days | End: 2021-09-23
Payer: COMMERCIAL

## 2021-09-23 VITALS
SYSTOLIC BLOOD PRESSURE: 136 MMHG | DIASTOLIC BLOOD PRESSURE: 79 MMHG | WEIGHT: 315 LBS | HEIGHT: 75 IN | HEART RATE: 68 BPM | OXYGEN SATURATION: 100 % | RESPIRATION RATE: 18 BRPM | TEMPERATURE: 99 F

## 2021-09-23 DIAGNOSIS — Z98.890 OTHER SPECIFIED POSTPROCEDURAL STATES: Chronic | ICD-10-CM

## 2021-09-23 DIAGNOSIS — Z41.9 ENCOUNTER FOR PROCEDURE FOR PURPOSES OTHER THAN REMEDYING HEALTH STATE, UNSPECIFIED: Chronic | ICD-10-CM

## 2021-09-23 DIAGNOSIS — K43.6 OTHER AND UNSPECIFIED VENTRAL HERNIA WITH OBSTRUCTION, WITHOUT GANGRENE: ICD-10-CM

## 2021-09-23 DIAGNOSIS — Z29.9 ENCOUNTER FOR PROPHYLACTIC MEASURES, UNSPECIFIED: ICD-10-CM

## 2021-09-23 DIAGNOSIS — Z90.89 ACQUIRED ABSENCE OF OTHER ORGANS: Chronic | ICD-10-CM

## 2021-09-23 DIAGNOSIS — Z01.818 ENCOUNTER FOR OTHER PREPROCEDURAL EXAMINATION: ICD-10-CM

## 2021-09-23 DIAGNOSIS — Z90.49 ACQUIRED ABSENCE OF OTHER SPECIFIED PARTS OF DIGESTIVE TRACT: Chronic | ICD-10-CM

## 2021-09-23 LAB
ANION GAP SERPL CALC-SCNC: 6 MMOL/L — SIGNIFICANT CHANGE UP (ref 5–17)
BASOPHILS # BLD AUTO: 0.04 K/UL — SIGNIFICANT CHANGE UP (ref 0–0.2)
BASOPHILS NFR BLD AUTO: 0.6 % — SIGNIFICANT CHANGE UP (ref 0–2)
BUN SERPL-MCNC: 11 MG/DL — SIGNIFICANT CHANGE UP (ref 7–23)
CALCIUM SERPL-MCNC: 9.3 MG/DL — SIGNIFICANT CHANGE UP (ref 8.5–10.1)
CHLORIDE SERPL-SCNC: 107 MMOL/L — SIGNIFICANT CHANGE UP (ref 96–108)
CO2 SERPL-SCNC: 26 MMOL/L — SIGNIFICANT CHANGE UP (ref 22–31)
CREAT SERPL-MCNC: 0.8 MG/DL — SIGNIFICANT CHANGE UP (ref 0.5–1.3)
EOSINOPHIL # BLD AUTO: 0.15 K/UL — SIGNIFICANT CHANGE UP (ref 0–0.5)
EOSINOPHIL NFR BLD AUTO: 2.3 % — SIGNIFICANT CHANGE UP (ref 0–6)
GLUCOSE SERPL-MCNC: 109 MG/DL — HIGH (ref 70–99)
HCT VFR BLD CALC: 41.3 % — SIGNIFICANT CHANGE UP (ref 39–50)
HGB BLD-MCNC: 14.4 G/DL — SIGNIFICANT CHANGE UP (ref 13–17)
IMM GRANULOCYTES NFR BLD AUTO: 1.1 % — SIGNIFICANT CHANGE UP (ref 0–1.5)
LYMPHOCYTES # BLD AUTO: 1.14 K/UL — SIGNIFICANT CHANGE UP (ref 1–3.3)
LYMPHOCYTES # BLD AUTO: 17.4 % — SIGNIFICANT CHANGE UP (ref 13–44)
MCHC RBC-ENTMCNC: 29.1 PG — SIGNIFICANT CHANGE UP (ref 27–34)
MCHC RBC-ENTMCNC: 34.9 GM/DL — SIGNIFICANT CHANGE UP (ref 32–36)
MCV RBC AUTO: 83.6 FL — SIGNIFICANT CHANGE UP (ref 80–100)
MONOCYTES # BLD AUTO: 0.36 K/UL — SIGNIFICANT CHANGE UP (ref 0–0.9)
MONOCYTES NFR BLD AUTO: 5.5 % — SIGNIFICANT CHANGE UP (ref 2–14)
NEUTROPHILS # BLD AUTO: 4.81 K/UL — SIGNIFICANT CHANGE UP (ref 1.8–7.4)
NEUTROPHILS NFR BLD AUTO: 73.1 % — SIGNIFICANT CHANGE UP (ref 43–77)
PLATELET # BLD AUTO: 180 K/UL — SIGNIFICANT CHANGE UP (ref 150–400)
POTASSIUM SERPL-MCNC: 3.9 MMOL/L — SIGNIFICANT CHANGE UP (ref 3.5–5.3)
POTASSIUM SERPL-SCNC: 3.9 MMOL/L — SIGNIFICANT CHANGE UP (ref 3.5–5.3)
RBC # BLD: 4.94 M/UL — SIGNIFICANT CHANGE UP (ref 4.2–5.8)
RBC # FLD: 12.3 % — SIGNIFICANT CHANGE UP (ref 10.3–14.5)
SODIUM SERPL-SCNC: 139 MMOL/L — SIGNIFICANT CHANGE UP (ref 135–145)
WBC # BLD: 6.57 K/UL — SIGNIFICANT CHANGE UP (ref 3.8–10.5)
WBC # FLD AUTO: 6.57 K/UL — SIGNIFICANT CHANGE UP (ref 3.8–10.5)

## 2021-09-23 PROCEDURE — 36415 COLL VENOUS BLD VENIPUNCTURE: CPT

## 2021-09-23 PROCEDURE — 93005 ELECTROCARDIOGRAM TRACING: CPT

## 2021-09-23 PROCEDURE — 87640 STAPH A DNA AMP PROBE: CPT

## 2021-09-23 PROCEDURE — 71046 X-RAY EXAM CHEST 2 VIEWS: CPT

## 2021-09-23 PROCEDURE — 85025 COMPLETE CBC W/AUTO DIFF WBC: CPT

## 2021-09-23 PROCEDURE — 83036 HEMOGLOBIN GLYCOSYLATED A1C: CPT

## 2021-09-23 PROCEDURE — G0463: CPT | Mod: 25

## 2021-09-23 PROCEDURE — 86901 BLOOD TYPING SEROLOGIC RH(D): CPT

## 2021-09-23 PROCEDURE — 71046 X-RAY EXAM CHEST 2 VIEWS: CPT | Mod: 26

## 2021-09-23 PROCEDURE — 86900 BLOOD TYPING SEROLOGIC ABO: CPT

## 2021-09-23 PROCEDURE — 86850 RBC ANTIBODY SCREEN: CPT

## 2021-09-23 PROCEDURE — 93010 ELECTROCARDIOGRAM REPORT: CPT

## 2021-09-23 PROCEDURE — 80048 BASIC METABOLIC PNL TOTAL CA: CPT

## 2021-09-23 PROCEDURE — 87641 MR-STAPH DNA AMP PROBE: CPT

## 2021-09-23 NOTE — H&P PST ADULT - NSICDXPASTSURGICALHX_GEN_ALL_CORE_FT
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 tube in right ear presently    History of other surgery port cath insertion 08/2018    S/P colonoscopy     S/P T&A (status post tonsillectomy and adenoidectomy)     Status post proctocolectomy with temporary Ileostomy- reversed 2019

## 2021-09-23 NOTE — H&P PST ADULT - NSICDXPASTMEDICALHX_GEN_ALL_CORE_FT
PAST MEDICAL HISTORY:  Anemia     COVID-19 vaccine series completed Pfizer- March    Essential hypertension     Foot drop, left     Hearing loss b/l    Iron deficiency anemia due to chronic blood loss     Lumbar herniated disc     Mild intermittent asthma without complication     Morbid obesity     Obstructive sleep apnea syndrome not using machine    Port-A-Cath in place right chest wall inserted 2018    Rectal cancer completed chemotherapy    Sarcoma     Seasonal allergies      PAST MEDICAL HISTORY:  Anemia history of anemia    COVID-19 vaccine series completed Pfizer- March    Essential hypertension     Foot drop, left     Hearing loss b/l    History of colon surgery for rectal cancer- ileostomy creatio and reversal in 2019    Iron deficiency anemia due to chronic blood loss     Lumbar herniated disc     Mild intermittent asthma without complication     Morbid obesity     Obstructive sleep apnea syndrome non compliant with CPAP    Peripheral neuropathy chemotherpay induced peripheral neuropathy    Port-A-Cath in place right chest wall inserted 2018    Rectal cancer completed chemotherapy    Sarcoma     Seasonal allergies

## 2021-09-23 NOTE — H&P PST ADULT - ASSESSMENT
44 years old male present to PST prior to laparoscopic, possible open reversal of ileostomy, repair of parastomal hernia with Dr. Velarde.    Plan   1. NPO after midnight  2. Use Flonase  3. Use E-Z sponge as directed  4. Use Mupirocin as directed.  5. Drink a quart of extra  fluids the day before your surgery.  6 Medical clearance with Dr. Calixto and cardiac clearance with Dr. Flores  7. CBC, BMP, LFT,  PT/PTT/INR, CEA, HGB AIC, Type and Screen sent to lab  8. EKG done  9. Chest x-ray on chart    CAPRINI SCORE [CLOT]    AGE RELATED RISK FACTORS                                                       MOBILITY RELATED FACTORS  [ x] Age 41-60 years                                            (1 Point)                  [ ] Bed rest                                                        (1 Point)  [ ] Age: 61-74 years                                           (2 Points)                 [ ] Plaster cast                                                   (2 Points)  [ ] Age= 75 years                                              (3 Points)                 [ ] Bed bound for more than 72 hours                 (2 Points)    DISEASE RELATED RISK FACTORS                                               GENDER SPECIFIC FACTORS  [ ] Edema in the lower extremities                       (1 Point)                  [ ] Pregnancy                                                     (1 Point)  [ ] Varicose veins                                               (1 Point)                  [ ] Post-partum < 6 weeks                                   (1 Point)             [x ] BMI > 25 Kg/m2                                            (1 Point)                  [ ] Hormonal therapy  or oral contraception          (1 Point)                 [ ] Sepsis (in the previous month)                        (1 Point)                  [ ] History of pregnancy complications                 (1 point)  [ ] Pneumonia or serious lung disease                                               [ ] Unexplained or recurrent                     (1 Point)           (in the previous month)                               (1 Point)  [ ] Abnormal pulmonary function test                     (1 Point)                 SURGERY RELATED RISK FACTORS  [ ] Acute myocardial infarction                              (1 Point)                 [ ]  Section                                             (1 Point)  [ ] Congestive heart failure (in the previous month)  (1 Point)               [ ] Minor surgery                                                  (1 Point)   [ ] Inflammatory bowel disease                             (1 Point)                 [ ] Arthroscopic surgery                                        (2 Points)  [ ] Central venous access                                      (2 Points)                [x ] General surgery lasting more than 45 minutes   (2 Points)       [ ] Stroke (in the previous month)                         (5 Points)               [ ] Elective arthroplasty                                         (5 Points)                                                                                                                                               HEMATOLOGY RELATED FACTORS                                                 TRAUMA RELATED RISK FACTORS  [ ] Prior episodes of VTE                                     (3 Points)                 [ ] Fracture of the hip, pelvis, or leg                       (5 Points)  [ ] Positive family history for VTE                         (3 Points)                 [ ] Acute spinal cord injury (in the previous month)  (5 Points)  [ ] Prothrombin 62554 A                                     (3 Points)                 [ ] Paralysis  (less than 1 month)                             (5 Points)  [ ] Factor V Leiden                                             (3 Points)                  [ ] Multiple Trauma within 1 month                        (5 Points)  [ ] Lupus anticoagulants                                     (3 Points)                                                           [ ] Anticardiolipin antibodies                               (3 Points)                                                       [ ] High homocysteine in the blood                      (3 Points)                                             [ ] Other congenital or acquired thrombophilia      (3 Points)                                                [ ] Heparin induced thrombocytopenia                  (3 Points)    ( x ) malignancy                                                   (2 points)      Total Score [      6    ] 46 years old male with a history of rectal cancer. Pt reports he completed chemotherapy and radiation therapy presents to [facility] for preprocedure exam. Patient is for planned laparoscopic possible open ventral hernia repair with Dr Velarde  on .           Plan:  - PST instructions given ; NPO status instructions to be given by ASU .  - Pt instructed to take following meds with sip of water : lisinopril   - Pt instructed to take routine evening medications unless indicated .  -  Stop NSAIDS ( Aspirin Alev Motrin Mobic Diclofenac), herbal supplements , MVI , Vitamin fish oil 7 days prior to surgery  unless   directed by surgeon or cardiologist;   - Medical Optimization  with Dr. Calixto   - EZ wash instructions given & mupirocin instructions given. Instructed to use Mupirocin on ly if he gets a phone call from PST staff   - Labs EKG CXR as per surgeon request.   -  Pt instructed to self quarantine .  - Covid Testing scheduled       CAPRINI SCORE [CLOT]    AGE RELATED RISK FACTORS                                                       MOBILITY RELATED FACTORS  [ x] Age 41-60 years                                            (1 Point)                  [ ] Bed rest                                                        (1 Point)  [ ] Age: 61-74 years                                           (2 Points)                 [ ] Plaster cast                                                   (2 Points)  [ ] Age= 75 years                                              (3 Points)                 [ ] Bed bound for more than 72 hours                 (2 Points)    DISEASE RELATED RISK FACTORS                                               GENDER SPECIFIC FACTORS  [x ] Edema in the lower extremities                       (1 Point)                  [ ] Pregnancy                                                     (1 Point)  [ ] Varicose veins                                               (1 Point)                  [ ] Post-partum < 6 weeks                                   (1 Point)             [x ] BMI > 25 Kg/m2                                            (1 Point)                  [ ] Hormonal therapy  or oral contraception          (1 Point)                 [ ] Sepsis (in the previous month)                        (1 Point)                  [ ] History of pregnancy complications                 (1 point)  [ ] Pneumonia or serious lung disease                                               [ ] Unexplained or recurrent                     (1 Point)           (in the previous month)                               (1 Point)  [ ] Abnormal pulmonary function test                     (1 Point)                 SURGERY RELATED RISK FACTORS  [ ] Acute myocardial infarction                              (1 Point)                 [ ]  Section                                             (1 Point)  [ ] Congestive heart failure (in the previous month)  (1 Point)               [ ] Minor surgery                                                  (1 Point)   [ ] Inflammatory bowel disease                             (1 Point)                 [ ] Arthroscopic surgery                                        (2 Points)  [ ] Central venous access                                      (2 Points)                [x ] General surgery lasting more than 45 minutes   (2 Points)       [ ] Stroke (in the previous month)                         (5 Points)               [ ] Elective arthroplasty                                         (5 Points)                                                                                                                                               HEMATOLOGY RELATED FACTORS                                                 TRAUMA RELATED RISK FACTORS  [ ] Prior episodes of VTE                                     (3 Points)                 [ ] Fracture of the hip, pelvis, or leg                       (5 Points)  [ ] Positive family history for VTE                         (3 Points)                 [ ] Acute spinal cord injury (in the previous month)  (5 Points)  [ ] Prothrombin 60166 A                                     (3 Points)                 [ ] Paralysis  (less than 1 month)                             (5 Points)  [ ] Factor V Leiden                                             (3 Points)                  [ ] Multiple Trauma within 1 month                        (5 Points)  [ ] Lupus anticoagulants                                     (3 Points)                                                           [ ] Anticardiolipin antibodies                               (3 Points)                                                       [ ] High homocysteine in the blood                      (3 Points)                                             [ ] Other congenital or acquired thrombophilia      (3 Points)                                                [ ] Heparin induced thrombocytopenia                  (3 Points)    ( x ) malignancy                                                   (2 points)      Total Score [  7  ]

## 2021-09-24 DIAGNOSIS — Z01.818 ENCOUNTER FOR OTHER PREPROCEDURAL EXAMINATION: ICD-10-CM

## 2021-09-24 DIAGNOSIS — K43.6 OTHER AND UNSPECIFIED VENTRAL HERNIA WITH OBSTRUCTION, WITHOUT GANGRENE: ICD-10-CM

## 2021-09-24 LAB
A1C WITH ESTIMATED AVERAGE GLUCOSE RESULT: 5.3 % — SIGNIFICANT CHANGE UP (ref 4–5.6)
ESTIMATED AVERAGE GLUCOSE: 105 MG/DL — SIGNIFICANT CHANGE UP (ref 68–114)
MRSA PCR RESULT.: SIGNIFICANT CHANGE UP
S AUREUS DNA NOSE QL NAA+PROBE: SIGNIFICANT CHANGE UP

## 2021-09-26 ENCOUNTER — APPOINTMENT (OUTPATIENT)
Dept: DISASTER EMERGENCY | Facility: CLINIC | Age: 46
End: 2021-09-26

## 2021-09-27 LAB — SARS-COV-2 N GENE NPH QL NAA+PROBE: NOT DETECTED

## 2021-09-27 NOTE — PHYSICAL EXAM
[Normal Breath Sounds] : Normal breath sounds [Normal Heart Sounds] : normal heart sounds [Normal Rate and Rhythm] : normal rate and rhythm [No Rash or Lesion] : No rash or lesion [Alert] : alert [Oriented to Person] : oriented to person [Oriented to Place] : oriented to place [Oriented to Time] : oriented to time [Calm] : calm [Abdomen Masses] : No abdominal masses [Abdomen Tenderness] : ~T No ~M abdominal tenderness [Tender] : nontender [JVD] : no jugular venous distention  [de-identified] : Morbidly Obese Well-healed incisions, Right-sided incisional hernia reducible [de-identified] : Looks well in no distress, of stated age. [de-identified] : pupils equal reactive to light normocephalic atraumatic. [de-identified] : moves all 4 extremities appropriately with 5 over 5 strength

## 2021-09-27 NOTE — ADDENDUM
[FreeTextEntry1] : 46 year old male with ventral hernia scheduled for laparoscopic possible open repair of incisional hernia with mesh. (CPT code 33913) at [facility]; general anesthesia (2 hours). Will need medical clearance. PST (including BW, ekg, etc) to be done at .  will forward the results to PCP.

## 2021-09-27 NOTE — HISTORY OF PRESENT ILLNESS
[FreeTextEntry1] : 46-year-old Morbidly obese male with rectal cancer and recent admission to [facility] for abdominal pain and incarcerated ventral incisional hernia that was reduced.. Overall doing well denies any pain changes in bowel habits or bleeding. Incisional hernia is larger

## 2021-09-27 NOTE — REVIEW OF SYSTEMS
[As Noted in HPI] : as noted in HPI [Negative] : Heme/Lymph [Abdominal Pain] : no abdominal pain [Constipation] : no constipation [FreeTextEntry7] : larger hernia defect

## 2021-09-27 NOTE — ASSESSMENT
[FreeTextEntry1] : 46-year-old male morbidly obese with history of rectal cancer and recent ileostomy reversal, With abdominal wall incisional hernia. recommend laparoscopic possible open repair of incisional hernia with mesh. given his current weight the chance of recurrence is very high. recommend 20-30lb weight loss before repair.

## 2021-09-28 RX ORDER — MEPERIDINE HYDROCHLORIDE 50 MG/ML
12.5 INJECTION INTRAMUSCULAR; INTRAVENOUS; SUBCUTANEOUS
Refills: 0 | Status: DISCONTINUED | OUTPATIENT
Start: 2021-09-29 | End: 2021-09-29

## 2021-09-28 RX ORDER — ONDANSETRON 8 MG/1
4 TABLET, FILM COATED ORAL ONCE
Refills: 0 | Status: DISCONTINUED | OUTPATIENT
Start: 2021-09-29 | End: 2021-09-29

## 2021-09-28 RX ORDER — FENTANYL CITRATE 50 UG/ML
50 INJECTION INTRAVENOUS
Refills: 0 | Status: DISCONTINUED | OUTPATIENT
Start: 2021-09-29 | End: 2021-09-29

## 2021-09-28 RX ORDER — SODIUM CHLORIDE 9 MG/ML
1000 INJECTION, SOLUTION INTRAVENOUS
Refills: 0 | Status: DISCONTINUED | OUTPATIENT
Start: 2021-09-29 | End: 2021-09-29

## 2021-09-29 ENCOUNTER — OUTPATIENT (OUTPATIENT)
Dept: INPATIENT UNIT | Facility: HOSPITAL | Age: 46
LOS: 1 days | Discharge: HOME | End: 2021-09-29
Payer: COMMERCIAL

## 2021-09-29 ENCOUNTER — RESULT REVIEW (OUTPATIENT)
Age: 46
End: 2021-09-29

## 2021-09-29 ENCOUNTER — APPOINTMENT (OUTPATIENT)
Dept: COLORECTAL SURGERY | Facility: HOSPITAL | Age: 46
End: 2021-09-29
Payer: COMMERCIAL

## 2021-09-29 VITALS
HEART RATE: 81 BPM | HEIGHT: 75 IN | TEMPERATURE: 98 F | DIASTOLIC BLOOD PRESSURE: 93 MMHG | RESPIRATION RATE: 16 BRPM | SYSTOLIC BLOOD PRESSURE: 138 MMHG | WEIGHT: 315 LBS | OXYGEN SATURATION: 96 %

## 2021-09-29 VITALS
TEMPERATURE: 98 F | OXYGEN SATURATION: 98 % | HEART RATE: 77 BPM | RESPIRATION RATE: 16 BRPM | DIASTOLIC BLOOD PRESSURE: 82 MMHG | SYSTOLIC BLOOD PRESSURE: 120 MMHG

## 2021-09-29 DIAGNOSIS — Z98.890 OTHER SPECIFIED POSTPROCEDURAL STATES: Chronic | ICD-10-CM

## 2021-09-29 DIAGNOSIS — Z90.89 ACQUIRED ABSENCE OF OTHER ORGANS: Chronic | ICD-10-CM

## 2021-09-29 DIAGNOSIS — Z41.9 ENCOUNTER FOR PROCEDURE FOR PURPOSES OTHER THAN REMEDYING HEALTH STATE, UNSPECIFIED: Chronic | ICD-10-CM

## 2021-09-29 DIAGNOSIS — Z90.49 ACQUIRED ABSENCE OF OTHER SPECIFIED PARTS OF DIGESTIVE TRACT: Chronic | ICD-10-CM

## 2021-09-29 DIAGNOSIS — K43.6 OTHER AND UNSPECIFIED VENTRAL HERNIA WITH OBSTRUCTION, WITHOUT GANGRENE: ICD-10-CM

## 2021-09-29 PROCEDURE — C9399: CPT

## 2021-09-29 PROCEDURE — 88302 TISSUE EXAM BY PATHOLOGIST: CPT

## 2021-09-29 PROCEDURE — C1781: CPT

## 2021-09-29 PROCEDURE — 49655: CPT | Mod: AS

## 2021-09-29 PROCEDURE — 88302 TISSUE EXAM BY PATHOLOGIST: CPT | Mod: 26

## 2021-09-29 PROCEDURE — 49655: CPT

## 2021-09-29 RX ORDER — OXYCODONE HYDROCHLORIDE 5 MG/1
5 TABLET ORAL ONCE
Refills: 0 | Status: DISCONTINUED | OUTPATIENT
Start: 2021-09-29 | End: 2021-09-29

## 2021-09-29 RX ADMIN — OXYCODONE HYDROCHLORIDE 5 MILLIGRAM(S): 5 TABLET ORAL at 11:31

## 2021-09-29 RX ADMIN — FENTANYL CITRATE 50 MICROGRAM(S): 50 INJECTION INTRAVENOUS at 11:30

## 2021-09-29 NOTE — ASU PATIENT PROFILE, ADULT - NSICDXPASTMEDICALHX_GEN_ALL_CORE_FT
PAST MEDICAL HISTORY:  Anemia history of anemia    COVID-19 vaccine series completed Pfizer- March    Essential hypertension     Foot drop, left     Hearing loss b/l    History of colon surgery for rectal cancer- ileostomy creatio and reversal in 2019    Iron deficiency anemia due to chronic blood loss     Lumbar herniated disc     Mild intermittent asthma without complication     Morbid obesity     Obstructive sleep apnea syndrome non compliant with CPAP    Peripheral neuropathy chemotherpay induced peripheral neuropathy    Port-A-Cath in place right chest wall inserted 2018    Rectal cancer completed chemotherapy    Sarcoma     Seasonal allergies

## 2021-09-29 NOTE — BRIEF OPERATIVE NOTE - NSICDXBRIEFPROCEDURE_GEN_ALL_CORE_FT
PROCEDURES:  Laparoscopic repair, incisional hernia 29-Sep-2021 10:53:21  Nahomy Graham  Lysis of iris adhesions 29-Sep-2021 10:53:32  Nahomy Graham  Block, transversus abdominis plane, bilateral 29-Sep-2021 10:54:30  Nahomy Graham

## 2021-09-29 NOTE — ASU PATIENT PROFILE, ADULT - NSICDXPASTSURGICALHX_GEN_ALL_CORE_FT
PAST SURGICAL HISTORY:  Elective surgery muscle removed from rleft foot due to sarcoma 2005    H/O myringotomy bilateral. tube in right ear presently    History of other surgery port cath insertion 08/2018    S/P colonoscopy     S/P T&A (status post tonsillectomy and adenoidectomy)     Status post proctocolectomy with temporary Ileostomy- reversed 2019

## 2021-09-29 NOTE — ASU DISCHARGE PLAN (ADULT/PEDIATRIC) - ASU DC SPECIAL INSTRUCTIONSFT
PAIN: You may continue to take Acetaminophen (Tylenol) and Ibuprofen (Advil, Motrin) over the counter for pain. If the over counter medication is not strong enough then stronger medication will sent but please only take for uncontrolled pain  WOUND CARE: Keep abdominal binder in place. You do not have any stitches that need to be removed. You have a wound vac dressing that can be removed on Friday 10/1. You have skin glue in place. You will be allowed to take a full shower in 2 days. You should allow warm soapy water to run down the wound in the shower. You do not need to scrub the area.   BATHING: Please do not soak or submerge the wound in water (bath, swimming) for 14 days after your surgery.  ACTIVITY: No heavy lifting, straining, or vigorous activity until your follow-up appointment in 1-2 weeks.   NOTIFY US IF: You have any bleeding that does not stop, any pus draining from your wound(s), any fever (over 100.4 F) or chills, persistent nausea/vomiting, persistent diarrhea, or if your pain is not controlled on your discharge pain medications.  FOLLOW-UP: Please call the office and make an appointment to follow up with Dr Mooney in 1-2 weeks.  Please follow up with your primary care physician in 1-2 weeks regarding your hospitalization. PAIN: You may continue to take Acetaminophen (Tylenol) and Ibuprofen (Advil, Motrin) over the counter for pain. If the over counter medication is not strong enough then stronger medication will sent but please only take for uncontrolled pain  WOUND CARE: Keep abdominal binder in place. You do not have any stitches that need to be removed. You have a wound vac dressing that can be removed on Friday 10/1. You have skin glue in place. You will be allowed to take a full shower in 2 days. You should allow warm soapy water to run down the wound in the shower. You do not need to scrub the area.   BATHING: Please do not soak or submerge the wound in water (bath, swimming) for 14 days after your surgery.  ACTIVITY: No heavy lifting, straining, or vigorous activity until your follow-up appointment in 2-3 weeks.   NOTIFY US IF: You have any bleeding that does not stop, any pus draining from your wound(s), any fever (over 100.4 F) or chills, persistent nausea/vomiting, persistent diarrhea, or if your pain is not controlled on your discharge pain medications.  FOLLOW-UP: Please call the office and make an appointment to follow up with Dr Velarde's office in 2-3 weeks.

## 2021-09-29 NOTE — BRIEF OPERATIVE NOTE - NSICDXBRIEFPOSTOP_GEN_ALL_CORE_FT
POST-OP DIAGNOSIS:  Incisional hernia 29-Sep-2021 10:55:22  Nahomy Graham  Umbilical hernia 29-Sep-2021 10:55:48  Nahomy Graham

## 2021-09-29 NOTE — BRIEF OPERATIVE NOTE - OPERATION/FINDINGS
Obese abdomen, incisional hernia sac excised in entirety, repair of incisional and umbilical hernia with 20x25 elliptical mesh.

## 2021-09-29 NOTE — ASU DISCHARGE PLAN (ADULT/PEDIATRIC) - CARE PROVIDER_API CALL
Nehemias Velarde)  ColonRectal Surgery; [place]  [address] [address]  Phone: [phone]  Fax: [phone]  Follow Up Time:    Nehemias Velarde)  ColonRectal Surgery; Surgery  [address] [place] [address]  Phone: [phone]  Fax: [phone]  Follow Up Time: 2 weeks

## 2021-10-05 DIAGNOSIS — K21.9 GASTRO-ESOPHAGEAL REFLUX DISEASE WITHOUT ESOPHAGITIS: ICD-10-CM

## 2021-10-05 DIAGNOSIS — K43.0 INCISIONAL HERNIA WITH OBSTRUCTION, WITHOUT GANGRENE: ICD-10-CM

## 2021-10-05 DIAGNOSIS — J45.909 UNSPECIFIED ASTHMA, UNCOMPLICATED: ICD-10-CM

## 2021-10-05 DIAGNOSIS — I10 ESSENTIAL (PRIMARY) HYPERTENSION: ICD-10-CM

## 2021-10-05 DIAGNOSIS — Z85.831 PERSONAL HISTORY OF MALIGNANT NEOPLASM OF SOFT TISSUE: ICD-10-CM

## 2021-10-05 DIAGNOSIS — E66.01 MORBID (SEVERE) OBESITY DUE TO EXCESS CALORIES: ICD-10-CM

## 2021-10-05 DIAGNOSIS — Z85.048 PERSONAL HISTORY OF OTHER MALIGNANT NEOPLASM OF RECTUM, RECTOSIGMOID JUNCTION, AND ANUS: ICD-10-CM

## 2021-10-05 DIAGNOSIS — K66.0 PERITONEAL ADHESIONS (POSTPROCEDURAL) (POSTINFECTION): ICD-10-CM

## 2021-10-05 DIAGNOSIS — G47.33 OBSTRUCTIVE SLEEP APNEA (ADULT) (PEDIATRIC): ICD-10-CM

## 2021-10-05 DIAGNOSIS — Z91.041 RADIOGRAPHIC DYE ALLERGY STATUS: ICD-10-CM

## 2021-10-05 DIAGNOSIS — R73.03 PREDIABETES: ICD-10-CM

## 2021-10-13 PROBLEM — Z92.29 PERSONAL HISTORY OF OTHER DRUG THERAPY: Chronic | Status: ACTIVE | Noted: 2021-09-23

## 2021-10-13 PROBLEM — D64.9 ANEMIA, UNSPECIFIED: Chronic | Status: ACTIVE | Noted: 2018-11-14

## 2021-10-13 PROBLEM — C20 MALIGNANT NEOPLASM OF RECTUM: Chronic | Status: ACTIVE | Noted: 2018-11-14

## 2021-10-13 PROBLEM — Z95.828 PRESENCE OF OTHER VASCULAR IMPLANTS AND GRAFTS: Chronic | Status: ACTIVE | Noted: 2018-11-14

## 2021-10-13 PROBLEM — G62.9 POLYNEUROPATHY, UNSPECIFIED: Chronic | Status: ACTIVE | Noted: 2021-09-23

## 2021-10-15 ENCOUNTER — APPOINTMENT (OUTPATIENT)
Dept: COLORECTAL SURGERY | Facility: CLINIC | Age: 46
End: 2021-10-15
Payer: COMMERCIAL

## 2021-10-15 VITALS
BODY MASS INDEX: 38.54 KG/M2 | WEIGHT: 310 LBS | RESPIRATION RATE: 16 BRPM | DIASTOLIC BLOOD PRESSURE: 82 MMHG | TEMPERATURE: 98.1 F | HEART RATE: 115 BPM | SYSTOLIC BLOOD PRESSURE: 125 MMHG | HEIGHT: 75 IN

## 2021-10-15 DIAGNOSIS — R10.9 UNSPECIFIED ABDOMINAL PAIN: ICD-10-CM

## 2021-10-15 PROCEDURE — 99024 POSTOP FOLLOW-UP VISIT: CPT

## 2021-10-15 NOTE — HISTORY OF PRESENT ILLNESS
[FreeTextEntry1] : 46-year-old male with a history of rectal cancer status post low anterior resection with diverting ileostomy he developed an incisional hernia.  He had a laparoscopic ventral hernia repair on September 29 and was recovering well.  Over the last 24 to 48 hours, he has developed a painful lump in his left upper abdomen somewhat remote from the surgical site.  He has no associated fevers, chills, nausea or vomiting.  He is having bowel movements without difficulty.

## 2021-10-15 NOTE — PHYSICAL EXAM
[Respiratory Effort] : normal respiratory effort [Calm] : calm [de-identified] : Soft, obese, nondistended.  Well-healing surgical incisions.  8 cm fullness in the left mid to upper abdomen.  There is not feel like a hernia.  No overlying erythema or fluctuance.  Tender to palpation. [de-identified] : Well-appearing, in no distress [de-identified] : Normocephalic, atraumatic [de-identified] : Moves extremities without difficulty [de-identified] : Warm and dry [de-identified] : Alert and oriented x3

## 2021-10-19 ENCOUNTER — APPOINTMENT (OUTPATIENT)
Dept: ULTRASOUND IMAGING | Facility: CLINIC | Age: 46
End: 2021-10-19
Payer: COMMERCIAL

## 2021-10-19 ENCOUNTER — NON-APPOINTMENT (OUTPATIENT)
Age: 46
End: 2021-10-19

## 2021-10-19 ENCOUNTER — OUTPATIENT (OUTPATIENT)
Dept: OUTPATIENT SERVICES | Facility: HOSPITAL | Age: 46
LOS: 1 days | End: 2021-10-19
Payer: COMMERCIAL

## 2021-10-19 DIAGNOSIS — Z90.89 ACQUIRED ABSENCE OF OTHER ORGANS: Chronic | ICD-10-CM

## 2021-10-19 DIAGNOSIS — Z41.9 ENCOUNTER FOR PROCEDURE FOR PURPOSES OTHER THAN REMEDYING HEALTH STATE, UNSPECIFIED: Chronic | ICD-10-CM

## 2021-10-19 DIAGNOSIS — Z90.49 ACQUIRED ABSENCE OF OTHER SPECIFIED PARTS OF DIGESTIVE TRACT: Chronic | ICD-10-CM

## 2021-10-19 DIAGNOSIS — R10.9 UNSPECIFIED ABDOMINAL PAIN: ICD-10-CM

## 2021-10-19 DIAGNOSIS — Z98.890 OTHER SPECIFIED POSTPROCEDURAL STATES: Chronic | ICD-10-CM

## 2021-10-19 PROCEDURE — 76700 US EXAM ABDOM COMPLETE: CPT

## 2021-10-19 PROCEDURE — 76700 US EXAM ABDOM COMPLETE: CPT | Mod: 26

## 2021-10-24 ENCOUNTER — APPOINTMENT (OUTPATIENT)
Dept: DISASTER EMERGENCY | Facility: CLINIC | Age: 46
End: 2021-10-24

## 2021-10-25 LAB — SARS-COV-2 N GENE NPH QL NAA+PROBE: NOT DETECTED

## 2021-10-26 ENCOUNTER — APPOINTMENT (OUTPATIENT)
Dept: COLORECTAL SURGERY | Facility: CLINIC | Age: 46
End: 2021-10-26

## 2021-10-27 ENCOUNTER — OUTPATIENT (OUTPATIENT)
Dept: INPATIENT UNIT | Facility: HOSPITAL | Age: 46
LOS: 1 days | Discharge: HOME | End: 2021-10-27
Payer: COMMERCIAL

## 2021-10-27 ENCOUNTER — RESULT REVIEW (OUTPATIENT)
Age: 46
End: 2021-10-27

## 2021-10-27 VITALS
SYSTOLIC BLOOD PRESSURE: 120 MMHG | HEIGHT: 75 IN | DIASTOLIC BLOOD PRESSURE: 76 MMHG | HEART RATE: 81 BPM | TEMPERATURE: 97 F | WEIGHT: 310.63 LBS | OXYGEN SATURATION: 98 % | RESPIRATION RATE: 16 BRPM

## 2021-10-27 VITALS
RESPIRATION RATE: 16 BRPM | DIASTOLIC BLOOD PRESSURE: 62 MMHG | HEART RATE: 74 BPM | SYSTOLIC BLOOD PRESSURE: 101 MMHG | TEMPERATURE: 97 F | OXYGEN SATURATION: 100 %

## 2021-10-27 DIAGNOSIS — Z90.49 ACQUIRED ABSENCE OF OTHER SPECIFIED PARTS OF DIGESTIVE TRACT: Chronic | ICD-10-CM

## 2021-10-27 DIAGNOSIS — Z41.9 ENCOUNTER FOR PROCEDURE FOR PURPOSES OTHER THAN REMEDYING HEALTH STATE, UNSPECIFIED: Chronic | ICD-10-CM

## 2021-10-27 DIAGNOSIS — Z98.890 OTHER SPECIFIED POSTPROCEDURAL STATES: Chronic | ICD-10-CM

## 2021-10-27 DIAGNOSIS — J45.909 UNSPECIFIED ASTHMA, UNCOMPLICATED: ICD-10-CM

## 2021-10-27 DIAGNOSIS — Z90.89 ACQUIRED ABSENCE OF OTHER ORGANS: Chronic | ICD-10-CM

## 2021-10-27 DIAGNOSIS — D64.9 ANEMIA, UNSPECIFIED: ICD-10-CM

## 2021-10-27 DIAGNOSIS — R18.8 OTHER ASCITES: ICD-10-CM

## 2021-10-27 DIAGNOSIS — C20 MALIGNANT NEOPLASM OF RECTUM: ICD-10-CM

## 2021-10-27 DIAGNOSIS — I10 ESSENTIAL (PRIMARY) HYPERTENSION: ICD-10-CM

## 2021-10-27 DIAGNOSIS — E66.01 MORBID (SEVERE) OBESITY DUE TO EXCESS CALORIES: ICD-10-CM

## 2021-10-27 LAB
ANION GAP SERPL CALC-SCNC: 4 MMOL/L — LOW (ref 5–17)
BUN SERPL-MCNC: 20 MG/DL — SIGNIFICANT CHANGE UP (ref 7–23)
CALCIUM SERPL-MCNC: 9.7 MG/DL — SIGNIFICANT CHANGE UP (ref 8.5–10.1)
CHLORIDE SERPL-SCNC: 107 MMOL/L — SIGNIFICANT CHANGE UP (ref 96–108)
CO2 SERPL-SCNC: 29 MMOL/L — SIGNIFICANT CHANGE UP (ref 22–31)
CREAT SERPL-MCNC: 0.79 MG/DL — SIGNIFICANT CHANGE UP (ref 0.5–1.3)
GLUCOSE SERPL-MCNC: 99 MG/DL — SIGNIFICANT CHANGE UP (ref 70–99)
HCT VFR BLD CALC: 39.4 % — SIGNIFICANT CHANGE UP (ref 39–50)
HGB BLD-MCNC: 12.8 G/DL — LOW (ref 13–17)
INR BLD: 1.17 RATIO — HIGH (ref 0.88–1.16)
MCHC RBC-ENTMCNC: 27.8 PG — SIGNIFICANT CHANGE UP (ref 27–34)
MCHC RBC-ENTMCNC: 32.5 GM/DL — SIGNIFICANT CHANGE UP (ref 32–36)
MCV RBC AUTO: 85.7 FL — SIGNIFICANT CHANGE UP (ref 80–100)
NIGHT BLUE STAIN TISS: SIGNIFICANT CHANGE UP
NIGHT BLUE STAIN TISS: SIGNIFICANT CHANGE UP
PLATELET # BLD AUTO: 206 K/UL — SIGNIFICANT CHANGE UP (ref 150–400)
POTASSIUM SERPL-MCNC: 4.1 MMOL/L — SIGNIFICANT CHANGE UP (ref 3.5–5.3)
POTASSIUM SERPL-SCNC: 4.1 MMOL/L — SIGNIFICANT CHANGE UP (ref 3.5–5.3)
PROTHROM AB SERPL-ACNC: 13.6 SEC — SIGNIFICANT CHANGE UP (ref 10.6–13.6)
RBC # BLD: 4.6 M/UL — SIGNIFICANT CHANGE UP (ref 4.2–5.8)
RBC # FLD: 11.7 % — SIGNIFICANT CHANGE UP (ref 10.3–14.5)
SODIUM SERPL-SCNC: 140 MMOL/L — SIGNIFICANT CHANGE UP (ref 135–145)
SPECIMEN SOURCE: SIGNIFICANT CHANGE UP
SPECIMEN SOURCE: SIGNIFICANT CHANGE UP
WBC # BLD: 5.73 K/UL — SIGNIFICANT CHANGE UP (ref 3.8–10.5)
WBC # FLD AUTO: 5.73 K/UL — SIGNIFICANT CHANGE UP (ref 3.8–10.5)

## 2021-10-27 PROCEDURE — 77012 CT SCAN FOR NEEDLE BIOPSY: CPT | Mod: 59

## 2021-10-27 PROCEDURE — 77012 CT SCAN FOR NEEDLE BIOPSY: CPT | Mod: 26,59

## 2021-10-27 PROCEDURE — 87015 SPECIMEN INFECT AGNT CONCNTJ: CPT

## 2021-10-27 PROCEDURE — 10030 IMG GID FLU COLL DRG SFT TIS: CPT | Mod: 59

## 2021-10-27 PROCEDURE — 10160 PNXR ASPIR ABSC HMTMA BULLA: CPT | Mod: 59

## 2021-10-27 PROCEDURE — 87075 CULTR BACTERIA EXCEPT BLOOD: CPT

## 2021-10-27 PROCEDURE — 87116 MYCOBACTERIA CULTURE: CPT

## 2021-10-27 PROCEDURE — 87206 SMEAR FLUORESCENT/ACID STAI: CPT

## 2021-10-27 PROCEDURE — 87070 CULTURE OTHR SPECIMN AEROBIC: CPT

## 2021-10-27 PROCEDURE — 85027 COMPLETE CBC AUTOMATED: CPT

## 2021-10-27 PROCEDURE — C1769: CPT

## 2021-10-27 PROCEDURE — 87102 FUNGUS ISOLATION CULTURE: CPT

## 2021-10-27 PROCEDURE — 80048 BASIC METABOLIC PNL TOTAL CA: CPT

## 2021-10-27 PROCEDURE — C1729: CPT

## 2021-10-27 PROCEDURE — 85610 PROTHROMBIN TIME: CPT

## 2021-10-27 PROCEDURE — 36415 COLL VENOUS BLD VENIPUNCTURE: CPT

## 2021-10-27 RX ORDER — OXYCODONE HYDROCHLORIDE 5 MG/1
5 TABLET ORAL ONCE
Refills: 0 | Status: DISCONTINUED | OUTPATIENT
Start: 2021-10-27 | End: 2021-10-27

## 2021-10-27 RX ORDER — ACETAMINOPHEN 500 MG
1000 TABLET ORAL ONCE
Refills: 0 | Status: DISCONTINUED | OUTPATIENT
Start: 2021-10-27 | End: 2021-10-27

## 2021-10-27 RX ORDER — FENTANYL CITRATE 50 UG/ML
50 INJECTION INTRAVENOUS
Refills: 0 | Status: DISCONTINUED | OUTPATIENT
Start: 2021-10-27 | End: 2021-10-27

## 2021-10-27 RX ORDER — SULFAMETHOXAZOLE AND TRIMETHOPRIM 800; 160 MG/1; MG/1
800-160 TABLET ORAL TWICE DAILY
Qty: 20 | Refills: 0 | Status: ACTIVE | COMMUNITY
Start: 2021-10-27 | End: 1900-01-01

## 2021-10-27 RX ORDER — FLUTICASONE PROPIONATE 50 MCG
2 SPRAY, SUSPENSION NASAL
Qty: 0 | Refills: 0 | DISCHARGE

## 2021-10-27 RX ORDER — SODIUM CHLORIDE 9 MG/ML
1000 INJECTION, SOLUTION INTRAVENOUS
Refills: 0 | Status: DISCONTINUED | OUTPATIENT
Start: 2021-10-27 | End: 2021-10-27

## 2021-10-27 NOTE — ASU DISCHARGE PLAN (ADULT/PEDIATRIC) - ASU DC SPECIAL INSTRUCTIONSFT
Follow with Dr. Velarde's office by telephone tomorrow regarding appointment in office.     Flush drain with 10cc NS QD. IR PA to educate before discharge.     Monitor and record output QD.

## 2021-11-04 RX ORDER — SODIUM CHLORIDE 0.9 % (FLUSH) 0.9 %
0.9 SYRINGE (ML) INJECTION
Qty: 1 | Refills: 0 | Status: ACTIVE | COMMUNITY
Start: 2021-11-03 | End: 1900-01-01

## 2021-11-10 ENCOUNTER — APPOINTMENT (OUTPATIENT)
Dept: OTOLARYNGOLOGY | Facility: CLINIC | Age: 46
End: 2021-11-10

## 2021-11-10 ENCOUNTER — APPOINTMENT (OUTPATIENT)
Age: 46
End: 2021-11-10
Payer: COMMERCIAL

## 2021-11-10 VITALS — WEIGHT: 305 LBS | BODY MASS INDEX: 37.92 KG/M2 | RESPIRATION RATE: 16 BRPM | TEMPERATURE: 98.6 F | HEIGHT: 75 IN

## 2021-11-10 DIAGNOSIS — R18.8 OTHER ASCITES: ICD-10-CM

## 2021-11-10 PROCEDURE — 99024 POSTOP FOLLOW-UP VISIT: CPT

## 2021-11-10 NOTE — HISTORY OF PRESENT ILLNESS
[FreeTextEntry1] : 46-year-old male with a history of rectal cancer status post low anterior resection with diverting ileostomy.  He developed an incisional hernia and underwent laparoscopic ventral hernia repair on September 29 he developed a large postoperative seroma was in the subcutaneous tissue and underwent IR drainage of fluid collections.  Cultures had no growth to date.  He is having very scant output from both and is here to have them removed.  He had a CT scan done yesterday and although has the disc available, I am unable to open it on any other computers.

## 2021-11-10 NOTE — PHYSICAL EXAM
[Respiratory Effort] : normal respiratory effort [Calm] : calm [de-identified] : Soft, obese, nondistended.  Well-healing surgical incisions.  Drains with small amount of serous output.  Both removed. [de-identified] : Well-appearing, in no distress [de-identified] : Normocephalic, atraumatic [de-identified] : Moves extremities without difficulty [de-identified] : Warm and dry [de-identified] : Alert and oriented x3

## 2021-11-24 LAB
CULTURE RESULTS: SIGNIFICANT CHANGE UP
CULTURE RESULTS: SIGNIFICANT CHANGE UP
SPECIMEN SOURCE: SIGNIFICANT CHANGE UP
SPECIMEN SOURCE: SIGNIFICANT CHANGE UP

## 2021-11-29 ENCOUNTER — APPOINTMENT (OUTPATIENT)
Dept: OTOLARYNGOLOGY | Facility: CLINIC | Age: 46
End: 2021-11-29

## 2022-04-12 ENCOUNTER — RESULT REVIEW (OUTPATIENT)
Age: 47
End: 2022-04-12

## 2022-05-18 NOTE — ASU PREOP CHECKLIST - HEART RATE (BEATS/MIN)
1. Caller Name: Mom                           Call Back Number: [phone] (home)         How would the patient prefer to be contacted with a response: Phone call do NOT leave a detailed message      Mom had called and stated that she had not received any phone call for Rylee's dermatology referral.    I had called mom back and informed her that it had gotten sent to [facility] and that [facility] is able to provide transportation, and if she would like for us to fill out the forms for that, mom stated 'yes'.    Informed her to first make an appointment and to call us back once an appointment is made in order for the forms to be filled out, mom understood.  
89

## 2022-06-06 ENCOUNTER — FORM ENCOUNTER (OUTPATIENT)
Age: 47
End: 2022-06-06

## 2022-07-07 ENCOUNTER — APPOINTMENT (OUTPATIENT)
Dept: NEUROSURGERY | Facility: CLINIC | Age: 47
End: 2022-07-07

## 2022-07-07 VITALS
DIASTOLIC BLOOD PRESSURE: 104 MMHG | BODY MASS INDEX: 38.54 KG/M2 | WEIGHT: 310 LBS | HEART RATE: 84 BPM | OXYGEN SATURATION: 97 % | TEMPERATURE: 97.3 F | SYSTOLIC BLOOD PRESSURE: 153 MMHG | HEIGHT: 75 IN

## 2022-07-07 PROCEDURE — 99204 OFFICE O/P NEW MOD 45 MIN: CPT

## 2022-07-07 NOTE — CONSULT LETTER
[Dear  ___] : Dear  [unfilled], [Courtesy Letter:] : I had the pleasure of seeing your patient, [unfilled], in my office today. [Sincerely,] : Sincerely, [FreeTextEntry2] : Ly Calixto MD\par [address]  [address]\par [place] [id number] [FreeTextEntry1] : Mr. Ortez is a very pleasant 47-year-old male patient who has an extensive surgical and medical history who was seen today in regards to bilateral lower extremity numbness and weakness.\par \par The patient endorses a longstanding history of left-sided dorsiflexion weakness at the ankle secondary to a surgical "muscle resection" in the left leg.  However, the patient is now developing right-sided symptoms and worsening his dorsiflexion weakness with associated atrophy in the left leg.  The patient endorses an approximate 18-month history of intermittent swelling in the left leg ultimately leading to muscle atrophy and near complete loss of ankle dorsiflexion.  The patient does not recall any specific inciting event leading up to the development of the symptoms but does have an extensive history of radiation and chemotherapy for rectal cancer.  The patient's last chemotherapy treatment was approximately 4 years ago.  More recently, the patient has started experiencing symptoms in the right leg with numbness below the knee and weakness with plantar flexion.  At this time, the patient has an AFO on the left foot and uses a cane to ambulate.  The patient denies any upper extremity symptoms or significant back pain.  The patient does complain of tightness in the lower and mid back.\par \par The patient's past medical history is significant for rectal cancer with chemotherapy and radiation therapy.  The patient has a hernia repair performed in 2021.  The patient also had a soft tissue resection on the left calf for a possible tumor in 2002.  The patient's past medical history includes hypertension, and obstructive sleep apnea.  The patient recently was diagnosed with renal carcinoma which is scheduled to be treated.  The patient has allergies to iodine which causes hives.  The patient's current medical regimen includes allopurinol and lisinopril.\par \par On examination, the patient is alert, oriented, and compliant with the exam.  The patient demonstrates full strength in the upper extremities bilaterally. The patient demonstrates 5/5 strength with hip flexion and knee extension bilaterally.  In the left foot, the patient demonstrates 4/5 plantar flexion of the ankle and 0/5 dorsiflexion of the ankle.  There is also significant atrophy in the calf compared to his right.  In the right lower extremity, there is mild pitting edema at the ankle.  At the right ankle, plantar flexion is rated at a 2/5 and dorsiflexion is rated at a 4/5.  There are no appreciable reflexes in the lower extremities at the patellar or the Achilles tendons.  There is no ankle clonus.  The patient has significant difficulty walking and uses a cane for ambulation.\par \par The patient is accompanied with an MRI scan of the lumbar spine dated April 12, 2022.  These images demonstrate mild to moderate degenerative changes with disc bulges most prominently at L3-L5.  The disc bulge at L4/5 is slightly eccentric to the right causing foraminal stenosis without overt nerve root compression.  There is a possibility of lateral recess stenosis on the right at this level.  There is slight widening of the facet joints bilaterally throughout the lumbar spine but this may be physiologic.\par \par Taken together, the patient has a clinical history and radiographic findings most consistent with a peripheral neuropathy.  However, the patient does not yet have a formal diagnosis.  I have requested EMG/nerve conduction studies to confirm or disprove this diagnosis.  The patient apparently had EMG/nerve conduction studies in January 2022 and was told that his problem was "coming from the back".  I explained to the patient that he has only degenerative findings in the lumbar spine consistent with aging and, without any nerve root compression, surgical intervention is very unlikely to help with his symptoms.  Given the history of cancer treatment, a chemotherapy-induced peripheral neuropathy is also on the differential.  The patient's left leg may be more sensitive secondary to his previous operations which is why symptoms develop their first.  However, his symptoms appeared almost 3 years after his most recent chemotherapy treatment which would be unusual.  Given the history of intermittent swelling in the lower extremities I have recommended a vascular surgeon evaluation and have provided a prescription for arterial Dopplers to rule out the possibility of peripheral arterial disease.  Finally, I have recommended an MRI scan of the thoracic spine to rule out a structural lesion there.  The patient will follow up with us once his studies are complete so that we can review them together and hopefully come up with a definitive diagnosis. [FreeTextEntry3] : Adam Marks MD, PhD, FRCSC, FAANS\par Attending Neurosurgeon\par  of Neurosurgery\par [facility] [employer] [facility]\par [facility]\par [address],\[place] [address]\par Office: [phone]\par Fax: [phone]\par

## 2022-07-18 ENCOUNTER — NON-APPOINTMENT (OUTPATIENT)
Age: 47
End: 2022-07-18

## 2022-07-30 NOTE — ASU PREOP CHECKLIST - TEMPERATURE IN CELSIUS (DEGREES C)
36.1 Objective:    Vitals:  T(C): 36.8 (07-30-22 @ 04:57), Max: 37 (07-29-22 @ 17:24)  HR: 90 (07-30-22 @ 04:57) (74 - 90)  BP: 104/64 (07-30-22 @ 04:57) (104/64 - 125/67)  RR: 18 (07-30-22 @ 04:57) (18 - 18)  SpO2: 99% (07-30-22 @ 04:57) (97% - 100%)    Physical Exam:  General: comfortable, no acute distress, well nourished  HEENT: Atraumatic, no LAD, trachea midline, PERRLA  Cardiovascular: normal s1s2, no murmurs, gallops or fricition rubs  Pulmonary: clear to ausculation Bilaterally, no wheezing , rhonchi  Gastrointestinal: soft non tender non distended, no masses felt, no organomegally  Muscloskeletal: no lower extremity edema, intact bilateral lower extremity pulses  Neurological: CN II-12 intact. No focal weakness  Psychiatrical: normal mood, cooperative  SKIN: no rash, lesions or ulcers

## 2022-08-01 ENCOUNTER — EMERGENCY (EMERGENCY)
Facility: HOSPITAL | Age: 47
LOS: 1 days | Discharge: OTHER INSTITUTION | End: 2022-08-01
Attending: EMERGENCY MEDICINE
Payer: COMMERCIAL

## 2022-08-01 ENCOUNTER — EMERGENCY (EMERGENCY)
Facility: HOSPITAL | Age: 47
LOS: 0 days | Discharge: ANOTHER HOSPITAL | End: 2022-08-01
Attending: EMERGENCY MEDICINE
Payer: COMMERCIAL

## 2022-08-01 VITALS
RESPIRATION RATE: 18 BRPM | SYSTOLIC BLOOD PRESSURE: 141 MMHG | HEART RATE: 71 BPM | OXYGEN SATURATION: 97 % | WEIGHT: 309.97 LBS | HEIGHT: 75 IN | TEMPERATURE: 98 F | DIASTOLIC BLOOD PRESSURE: 107 MMHG

## 2022-08-01 VITALS
HEART RATE: 76 BPM | TEMPERATURE: 97 F | RESPIRATION RATE: 18 BRPM | OXYGEN SATURATION: 100 % | SYSTOLIC BLOOD PRESSURE: 150 MMHG | DIASTOLIC BLOOD PRESSURE: 98 MMHG

## 2022-08-01 VITALS — WEIGHT: 309.97 LBS | HEIGHT: 75 IN

## 2022-08-01 DIAGNOSIS — R26.2 DIFFICULTY IN WALKING, NOT ELSEWHERE CLASSIFIED: ICD-10-CM

## 2022-08-01 DIAGNOSIS — Z92.21 PERSONAL HISTORY OF ANTINEOPLASTIC CHEMOTHERAPY: ICD-10-CM

## 2022-08-01 DIAGNOSIS — Z90.89 ACQUIRED ABSENCE OF OTHER ORGANS: Chronic | ICD-10-CM

## 2022-08-01 DIAGNOSIS — H91.93 UNSPECIFIED HEARING LOSS, BILATERAL: ICD-10-CM

## 2022-08-01 DIAGNOSIS — R22.41 LOCALIZED SWELLING, MASS AND LUMP, RIGHT LOWER LIMB: ICD-10-CM

## 2022-08-01 DIAGNOSIS — R29.898 OTHER SYMPTOMS AND SIGNS INVOLVING THE MUSCULOSKELETAL SYSTEM: ICD-10-CM

## 2022-08-01 DIAGNOSIS — Z20.822 CONTACT WITH AND (SUSPECTED) EXPOSURE TO COVID-19: ICD-10-CM

## 2022-08-01 DIAGNOSIS — D50.9 IRON DEFICIENCY ANEMIA, UNSPECIFIED: ICD-10-CM

## 2022-08-01 DIAGNOSIS — J30.2 OTHER SEASONAL ALLERGIC RHINITIS: ICD-10-CM

## 2022-08-01 DIAGNOSIS — M21.372 FOOT DROP, LEFT FOOT: ICD-10-CM

## 2022-08-01 DIAGNOSIS — Z98.890 OTHER SPECIFIED POSTPROCEDURAL STATES: Chronic | ICD-10-CM

## 2022-08-01 DIAGNOSIS — J45.20 MILD INTERMITTENT ASTHMA, UNCOMPLICATED: ICD-10-CM

## 2022-08-01 DIAGNOSIS — Z41.9 ENCOUNTER FOR PROCEDURE FOR PURPOSES OTHER THAN REMEDYING HEALTH STATE, UNSPECIFIED: Chronic | ICD-10-CM

## 2022-08-01 DIAGNOSIS — E66.01 MORBID (SEVERE) OBESITY DUE TO EXCESS CALORIES: ICD-10-CM

## 2022-08-01 DIAGNOSIS — M51.16 INTERVERTEBRAL DISC DISORDERS WITH RADICULOPATHY, LUMBAR REGION: ICD-10-CM

## 2022-08-01 DIAGNOSIS — Z90.49 ACQUIRED ABSENCE OF OTHER SPECIFIED PARTS OF DIGESTIVE TRACT: ICD-10-CM

## 2022-08-01 DIAGNOSIS — C49.9 MALIGNANT NEOPLASM OF CONNECTIVE AND SOFT TISSUE, UNSPECIFIED: ICD-10-CM

## 2022-08-01 DIAGNOSIS — Z90.49 ACQUIRED ABSENCE OF OTHER SPECIFIED PARTS OF DIGESTIVE TRACT: Chronic | ICD-10-CM

## 2022-08-01 DIAGNOSIS — Z95.828 PRESENCE OF OTHER VASCULAR IMPLANTS AND GRAFTS: ICD-10-CM

## 2022-08-01 DIAGNOSIS — Z85.038 PERSONAL HISTORY OF OTHER MALIGNANT NEOPLASM OF LARGE INTESTINE: ICD-10-CM

## 2022-08-01 DIAGNOSIS — I10 ESSENTIAL (PRIMARY) HYPERTENSION: ICD-10-CM

## 2022-08-01 DIAGNOSIS — G47.33 OBSTRUCTIVE SLEEP APNEA (ADULT) (PEDIATRIC): ICD-10-CM

## 2022-08-01 DIAGNOSIS — Z91.041 RADIOGRAPHIC DYE ALLERGY STATUS: ICD-10-CM

## 2022-08-01 DIAGNOSIS — G62.9 POLYNEUROPATHY, UNSPECIFIED: ICD-10-CM

## 2022-08-01 LAB
ALBUMIN SERPL ELPH-MCNC: 3.6 G/DL — SIGNIFICANT CHANGE UP (ref 3.3–5)
ALP SERPL-CCNC: 65 U/L — SIGNIFICANT CHANGE UP (ref 40–120)
ALT FLD-CCNC: 33 U/L — SIGNIFICANT CHANGE UP (ref 12–78)
ANION GAP SERPL CALC-SCNC: 3 MMOL/L — LOW (ref 5–17)
APPEARANCE UR: CLEAR — SIGNIFICANT CHANGE UP
AST SERPL-CCNC: 26 U/L — SIGNIFICANT CHANGE UP (ref 15–37)
BASOPHILS # BLD AUTO: 0.04 K/UL — SIGNIFICANT CHANGE UP (ref 0–0.2)
BASOPHILS NFR BLD AUTO: 0.7 % — SIGNIFICANT CHANGE UP (ref 0–2)
BILIRUB SERPL-MCNC: 0.9 MG/DL — SIGNIFICANT CHANGE UP (ref 0.2–1.2)
BILIRUB UR-MCNC: NEGATIVE — SIGNIFICANT CHANGE UP
BUN SERPL-MCNC: 12 MG/DL — SIGNIFICANT CHANGE UP (ref 7–23)
CALCIUM SERPL-MCNC: 9.1 MG/DL — SIGNIFICANT CHANGE UP (ref 8.5–10.1)
CHLORIDE SERPL-SCNC: 110 MMOL/L — HIGH (ref 96–108)
CK SERPL-CCNC: 295 U/L — SIGNIFICANT CHANGE UP (ref 26–308)
CO2 SERPL-SCNC: 28 MMOL/L — SIGNIFICANT CHANGE UP (ref 22–31)
COLOR SPEC: YELLOW — SIGNIFICANT CHANGE UP
CREAT SERPL-MCNC: 0.83 MG/DL — SIGNIFICANT CHANGE UP (ref 0.5–1.3)
DIFF PNL FLD: ABNORMAL
EGFR: 107 ML/MIN/1.73M2 — SIGNIFICANT CHANGE UP
EOSINOPHIL # BLD AUTO: 0.08 K/UL — SIGNIFICANT CHANGE UP (ref 0–0.5)
EOSINOPHIL NFR BLD AUTO: 1.5 % — SIGNIFICANT CHANGE UP (ref 0–6)
FLUAV AG NPH QL: SIGNIFICANT CHANGE UP
FLUBV AG NPH QL: SIGNIFICANT CHANGE UP
GLUCOSE SERPL-MCNC: 119 MG/DL — HIGH (ref 70–99)
GLUCOSE UR QL: NEGATIVE — SIGNIFICANT CHANGE UP
HCT VFR BLD CALC: 42.3 % — SIGNIFICANT CHANGE UP (ref 39–50)
HGB BLD-MCNC: 14.1 G/DL — SIGNIFICANT CHANGE UP (ref 13–17)
IMM GRANULOCYTES NFR BLD AUTO: 0.4 % — SIGNIFICANT CHANGE UP (ref 0–1.5)
KETONES UR-MCNC: NEGATIVE — SIGNIFICANT CHANGE UP
LEUKOCYTE ESTERASE UR-ACNC: NEGATIVE — SIGNIFICANT CHANGE UP
LYMPHOCYTES # BLD AUTO: 1.01 K/UL — SIGNIFICANT CHANGE UP (ref 1–3.3)
LYMPHOCYTES # BLD AUTO: 18.8 % — SIGNIFICANT CHANGE UP (ref 13–44)
MAGNESIUM SERPL-MCNC: 2 MG/DL — SIGNIFICANT CHANGE UP (ref 1.6–2.6)
MCHC RBC-ENTMCNC: 29 PG — SIGNIFICANT CHANGE UP (ref 27–34)
MCHC RBC-ENTMCNC: 33.3 GM/DL — SIGNIFICANT CHANGE UP (ref 32–36)
MCV RBC AUTO: 86.9 FL — SIGNIFICANT CHANGE UP (ref 80–100)
MONOCYTES # BLD AUTO: 0.31 K/UL — SIGNIFICANT CHANGE UP (ref 0–0.9)
MONOCYTES NFR BLD AUTO: 5.8 % — SIGNIFICANT CHANGE UP (ref 2–14)
NEUTROPHILS # BLD AUTO: 3.92 K/UL — SIGNIFICANT CHANGE UP (ref 1.8–7.4)
NEUTROPHILS NFR BLD AUTO: 72.8 % — SIGNIFICANT CHANGE UP (ref 43–77)
NITRITE UR-MCNC: NEGATIVE — SIGNIFICANT CHANGE UP
PH UR: 6 — SIGNIFICANT CHANGE UP (ref 5–8)
PLATELET # BLD AUTO: 138 K/UL — LOW (ref 150–400)
POTASSIUM SERPL-MCNC: 4.1 MMOL/L — SIGNIFICANT CHANGE UP (ref 3.5–5.3)
POTASSIUM SERPL-SCNC: 4.1 MMOL/L — SIGNIFICANT CHANGE UP (ref 3.5–5.3)
PROT SERPL-MCNC: 6.9 GM/DL — SIGNIFICANT CHANGE UP (ref 6–8.3)
PROT UR-MCNC: NEGATIVE — SIGNIFICANT CHANGE UP
RBC # BLD: 4.87 M/UL — SIGNIFICANT CHANGE UP (ref 4.2–5.8)
RBC # FLD: 12.9 % — SIGNIFICANT CHANGE UP (ref 10.3–14.5)
RSV RNA NPH QL NAA+NON-PROBE: SIGNIFICANT CHANGE UP
SARS-COV-2 RNA SPEC QL NAA+PROBE: SIGNIFICANT CHANGE UP
SODIUM SERPL-SCNC: 141 MMOL/L — SIGNIFICANT CHANGE UP (ref 135–145)
SP GR SPEC: 1.02 — SIGNIFICANT CHANGE UP (ref 1.01–1.02)
UROBILINOGEN FLD QL: NEGATIVE — SIGNIFICANT CHANGE UP
WBC # BLD: 5.38 K/UL — SIGNIFICANT CHANGE UP (ref 3.8–10.5)
WBC # FLD AUTO: 5.38 K/UL — SIGNIFICANT CHANGE UP (ref 3.8–10.5)

## 2022-08-01 PROCEDURE — 72148 MRI LUMBAR SPINE W/O DYE: CPT | Mod: 26,MA

## 2022-08-01 PROCEDURE — 99285 EMERGENCY DEPT VISIT HI MDM: CPT | Mod: 25

## 2022-08-01 PROCEDURE — 86618 LYME DISEASE ANTIBODY: CPT

## 2022-08-01 PROCEDURE — 99283 EMERGENCY DEPT VISIT LOW MDM: CPT

## 2022-08-01 PROCEDURE — 86666 EHRLICHIA ANTIBODY: CPT

## 2022-08-01 PROCEDURE — 93010 ELECTROCARDIOGRAM REPORT: CPT

## 2022-08-01 PROCEDURE — 36415 COLL VENOUS BLD VENIPUNCTURE: CPT

## 2022-08-01 PROCEDURE — 87476 LYME DIS DNA AMP PROBE: CPT

## 2022-08-01 PROCEDURE — 99285 EMERGENCY DEPT VISIT HI MDM: CPT

## 2022-08-01 PROCEDURE — 0241U: CPT

## 2022-08-01 PROCEDURE — 83735 ASSAY OF MAGNESIUM: CPT

## 2022-08-01 PROCEDURE — 80053 COMPREHEN METABOLIC PANEL: CPT

## 2022-08-01 PROCEDURE — 72146 MRI CHEST SPINE W/O DYE: CPT | Mod: MA

## 2022-08-01 PROCEDURE — 82550 ASSAY OF CK (CPK): CPT

## 2022-08-01 PROCEDURE — 71045 X-RAY EXAM CHEST 1 VIEW: CPT

## 2022-08-01 PROCEDURE — 93005 ELECTROCARDIOGRAM TRACING: CPT

## 2022-08-01 PROCEDURE — 72146 MRI CHEST SPINE W/O DYE: CPT | Mod: 26,MA

## 2022-08-01 PROCEDURE — 93970 EXTREMITY STUDY: CPT

## 2022-08-01 PROCEDURE — 85025 COMPLETE CBC W/AUTO DIFF WBC: CPT

## 2022-08-01 PROCEDURE — 72148 MRI LUMBAR SPINE W/O DYE: CPT | Mod: MA

## 2022-08-01 PROCEDURE — 86753 PROTOZOA ANTIBODY NOS: CPT

## 2022-08-01 PROCEDURE — 87086 URINE CULTURE/COLONY COUNT: CPT

## 2022-08-01 PROCEDURE — 93970 EXTREMITY STUDY: CPT | Mod: 26

## 2022-08-01 PROCEDURE — 99284 EMERGENCY DEPT VISIT MOD MDM: CPT

## 2022-08-01 PROCEDURE — 71045 X-RAY EXAM CHEST 1 VIEW: CPT | Mod: 26

## 2022-08-01 PROCEDURE — 81001 URINALYSIS AUTO W/SCOPE: CPT

## 2022-08-01 RX ORDER — POLYETHYLENE GLYCOL 3350 17 G/17G
17 POWDER, FOR SOLUTION ORAL ONCE
Refills: 0 | Status: COMPLETED | OUTPATIENT
Start: 2022-08-01 | End: 2022-08-01

## 2022-08-01 RX ORDER — AZITHROMYCIN 500 MG/1
500 TABLET, FILM COATED ORAL ONCE
Refills: 0 | Status: COMPLETED | OUTPATIENT
Start: 2022-08-01 | End: 2022-08-01

## 2022-08-01 RX ORDER — PSYLLIUM SEED (WITH DEXTROSE)
1 POWDER (GRAM) ORAL ONCE
Refills: 0 | Status: COMPLETED | OUTPATIENT
Start: 2022-08-01 | End: 2022-08-01

## 2022-08-01 RX ADMIN — Medication 1 PACKET(S): at 22:35

## 2022-08-01 RX ADMIN — POLYETHYLENE GLYCOL 3350 17 GRAM(S): 17 POWDER, FOR SOLUTION ORAL at 22:34

## 2022-08-01 RX ADMIN — AZITHROMYCIN 500 MILLIGRAM(S): 500 TABLET, FILM COATED ORAL at 08:51

## 2022-08-01 NOTE — ED PROVIDER NOTE - OBJECTIVE STATEMENT
The patient is a 47 year old male presents with bilateral leg weakness and numbness and transferred from [facility] for bigger MRI  The patient is currently being treated for babesiosis  No CP, No SOB, No abd pain

## 2022-08-01 NOTE — CONSULT NOTE ADULT - SUBJECTIVE AND OBJECTIVE BOX
Patient is a 47y old  Male who presents with a chief complaint of worsening b/l lower ext weakness     HPI: Pt is a very pleasant 47 year old obese man with a PMH of colon cancer s/p resection and chemotherapy 4 years ago, HTN, peripheral neuropathy, and chronic left foot drop. He presents to the ED c/o worsening numbness in his RLE, urinary retention and one episode of bowl incontinence on Saturday night. He states he now has worsening numbness in his right leg up to his ankle and he feels the weakness is also worse. He states he has numbness in his buttock but denies saddle anesthesia. On exam he describes his left foot as having "pins and needles" up to the mid calf and true numbness in his right foot up to the ankle. His left foot is 4/5 with plantar flexion and 1/5 and his RLE is  with dorsi flexion which is consistent with Dr. Marks's exam back in June. Pt was scheduled to have an out patient MRI. He was sent to the MR scanner here at [facility] but his shoulder width did not fit in the machine. He denies any pain. He states he also has a cyst on his kidney which is scheduled for surgical removal.     PAST MEDICAL & SURGICAL HISTORY:  Essential hypertension  Seasonal allergies  Mild intermittent asthma without complication  Iron deficiency anemia due to chronic blood loss  Lumbar herniated disc  Obstructive sleep apnea syndrome, non compliant with CPAP  Sarcoma  Foot drop, left  Morbid obesity  Port-A-Cath in place, right chest wall inserted 2018  Hearing loss, b/l  Rectal cancer, completed chemotherapy, s/p colon resection, colostomy and reversal   Peripheral neuropathy, chemotherpay induced peripheral neuropathy  S/P colon surgery, for rectal cancer- ileostomy creatio and reversal in 2019  S/P myringotomy, bilateral. 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      Vital Signs Last 24 Hrs  T(C): 36.4 (01 Aug 2022 11:00), Max: 36.6 (01 Aug 2022 05:47)  T(F): 97.6 (01 Aug 2022 11:00), Max: 97.9 (01 Aug 2022 05:47)  HR: 67 (01 Aug 2022 11:00) (67 - 74)  BP: 145/84 (01 Aug 2022 11:00) (145/84 - 160/101)  BP(mean): 100 (01 Aug 2022 11:00) (100 - 100)  RR: 19 (01 Aug 2022 11:00) (18 - 19)  SpO2: 97% (01 Aug 2022 11:00) (97% - 97%)    Parameters below as of 01 Aug 2022 11:00  Patient On (Oxygen Delivery Method): room air    Physical Exam:  Constitutional: Awake / alert, NAD, resting comfortably in bed  Eyes: anicteric sclera moist conjunctivae PEARRLA  HENT: atraumatic, oropharynx clear with moist mucous  membranes no mucosal ulcerations  Neck: trachea midline, FROM, supple, no thyromegaly or lymphadenopathy  Respiratory: Breath sounds are clear bilaterally, normal respiratory effort no intercostal retractions   Cardiovascular: S1 and S2, regular rhythm  Gastrointestinal: Soft, NT/ND, +BS, obese   Extremities: left calf smaller than right, +redness to b/l feet   Skin: No rashes  Psych: appropriate affect, alert, and oriented to person, place and time    Neurological Exam:  HF: A x O x 3, appropriately interactive, normal affect, speech fluent, no aphasia or paraphasic errors. Naming/repetition intact   CN: PERRL, EOMI, VFF, facial sensation normal, no NLFD, tongue midline  Motor: 5/5 b/l upper ext, 5/5 proximal b/l lower, LEFT foot 4/5 plantar flexion 1/5 dorsi flection, RIGHT foot 4/5 dorsi and plantar flexion   Sens: decreased to light touch in the left leg up to the mid calf and on the right foot and ankle, +b/l buttock numbness   Reflexes: Symmetric and normal, downgoing toes b/l  Coord:  No FNFA, dysmetria, NAUN intact   Gait/Balance: Cannot test    Labs:                        14.1   5.38  )-----------( 138      ( 01 Aug 2022 07:08 )             42.3     08-01    141  |  110<H>  |  12  ----------------------------<  119<H>  4.1   |  28  |  0.83    Ca    9.1      01 Aug 2022 07:08  Mg     2.0     08-01    TPro  6.9  /  Alb  3.6  /  TBili  0.9  /  DBili  x   /  AST  26  /  ALT  33  /  AlkPhos  65  08-01    RADIOLOGY:  UNABLE TO OBTAIN MRI
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
## 2022-08-01 NOTE — CONSULT NOTE ADULT - ASSESSMENT
I reviewed the MRI with no significant spinal stenosis caregivers at [facility] are transferring him back to [place] I think this is reasonable with no emergent findings at this time.

## 2022-08-01 NOTE — CONSULT NOTE ADULT - ASSESSMENT
Pt is a very pleasant 47 year old obese man with a PMH of colon cancer s/p resection and chemotherapy 4 years ago, HTN, peripheral neuropathy, and chronic left foot drop. He presents to the ED c/o worsening numbness in his RLE, urinary retention and one episode of bowl incontinence on Saturday night. He states he now has worsening numbness in his right leg up to his ankle and he feels the weakness is also worse. He states he has numbness in his buttock but denies saddle anesthesia. On exam he describes his left foot as having "pins and needles" up to the mid calf and true numbness in his right foot up to the ankle. His left foot is 4/5 with plantar flexion and 1/5 and his RLE is  with dorsi flexion which is consistent with Dr. Marks's exam back in June. Pt was scheduled to have an out patient MRI. He was sent to the MR scanner here at [facility] but his shoulder width did not fit in the machine.    -Pt had an outpt MRI back in April of 2022 which did not show any lesion witch would explain his current symptoms  -Dr. Marks ordered an MR of the thoracic spine for completeness   -Pt was also recently diagnosis with babesiosis after a tick bite one year ago - never treated   -Unable to obtain MRI here at [place] and recommend transfer to [facility] where the MR scanner is 10cm larger    Dr. Marks will review MRI once completed at [facility]     Plan for transfer discussed with patient, Dr. Adam Marks, Dr. Tabares and Dr. Tim Marks in the ED   65 minutes spent coordinating care, examining patient, and reviewing past medical history.

## 2022-08-01 NOTE — ED ADULT NURSE NOTE - CHIEF COMPLAINT QUOTE
Pt BIBA transfer from [facility] for bigger size MRI machine, pt experiencing numbness/weakness to left lower leg, pt thinks its because of tick bite, recently diagnosed with babeosis, ambulates w/ cane

## 2022-08-01 NOTE — ED PROVIDER NOTE - PROGRESS NOTE DETAILS
Jeb Joe for attending Dr. Tabares: Pt can not fit in the MRI machine. Neurosurgery paged for consult. pt seen by neurosurgery states that patient needs MRI - states that patient should be transferred to [facility] as they have a larger MRI. I spoke with transfer center - will do ED- ED transfer, pt to get neurosurgery consult at [facility] and MRI. Nehemias Tabares M.D., Attending Physician

## 2022-08-01 NOTE — ED PROVIDER NOTE - NS_ ATTENDINGSCRIBEDETAILS _ED_A_ED_FT
I, Nehemias Tabares MD,  performed the initial face to face bedside interview with this patient regarding history of present illness, review of symptoms and relevant past medical, social and family history.  I completed an independent physical examination.  I was the initial provider who evaluated this patient.   I personally saw the patient and performed a substantive portion of the visit including all aspects of the medical decision making.  The history, relevant review of systems, past medical and surgical history, medical decision making, and physical examination was documented by the scribe in my presence and I attest to the accuracy of the documentation.

## 2022-08-01 NOTE — CONSULT NOTE ADULT - SUBJECTIVE AND OBJECTIVE BOX
Pt Name: YANCY AVILA    MRN: [id number]      Patient is a 47y Male h/o left foot drop x 1 year presenting to the emergency department with a chief complaint of worsening RLE paresthesias/weakness x 3 weeks, with associated saddle anesthesia x 4 days. Pt states that he has been dealing with left foot swelling/numbness and a left foot drop x 1 year of unknown origin. Pt states that he has seen many neuro and ortho spine specialists that have continually told him that "his back is fine and no surgery recommended." Pt recently follows up with Dr. Marks (neurosurgery from [facility]) , who has requested that patient be transferred to [facility] to obtain MRIs. Of note, patient states that he was recently found to be positive for Babesiosis and has received one dose of abx, he is unsure how long he has been positive. Pt otherwise denies back/neck pain, fever/chills, c/p, sob, abd pain, n/v/c/d, dysuria, numbness/tingling/weakness and has no other complaints at this time. Pt admits to one episode of bowel incontinence and otherwise denies urinary incontinence but states that "it is hard to push when peeing." Pt has been ambulating with the use of a cane since his foot drop for approx 1 year.      HEALTH ISSUES - PROBLEM Dx:      .      REVIEW OF SYSTEMS      General: Alert, responsive, in NAD    Skin/Breast: No rashes, no pruritis   	  Ophthalmologic: No visual changes. No redness.   	  ENMT:	No discharge. No swelling.    Respiratory and Thorax: No difficulty breathing. No cough.  	   Cardiovascular:	No chest pain. No palpitations.    Gastrointestinal:	 No abdominal pain. No diarrhea.     Genitourinary: No dysuria. No bleeding.     Musculoskeletal: SEE HPI.    Neurological: + b/l LE paresthesias/numbness    Psychiatric: No anxiety or depression.    Hematology/Lymphatics: No swelling.    Endocrine: No Hx of diabetes.    ROS is otherwise negative.    PAST MEDICAL & SURGICAL HISTORY:  PAST MEDICAL & SURGICAL HISTORY:  Essential hypertension      Seasonal allergies      Mild intermittent asthma without complication      Iron deficiency anemia due to chronic blood loss      Lumbar herniated disc      Obstructive sleep apnea syndrome  non compliant with CPAP      Sarcoma      Foot drop, left      Morbid obesity      Port-A-Cath in place  right chest wall inserted 2018      Hearing loss  b/l      Anemia  history of anemia      Rectal cancer  completed chemotherapy      COVID-19 vaccine series completed  [employer]- March      Peripheral neuropathy  chemotherpay induced peripheral neuropathy      History of colon surgery  for rectal cancer- ileostomy creatio and reversal in 2019      H/O myringotomy  bilateral. 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      S/P colonoscopy      History of other surgery  port cath insertion 08/2018      Status post proctocolectomy  with temporary Ileostomy- reversed 2019          Allergies: iodinated radiocontrast agents (Rash; Urticaria; Flushing; Hives)      Medications:     FAMILY HISTORY:  Family history of hypertension (Father)    Family history of renal disease (Father)    : non-contributory    Social History: Denies ETOH abuse    Ambulation: Walking independently [ ] With Cane [ x ] With Walker [ ]  Bed / wheelchairbound [ ]                           14.1   5.38  )-----------( 138      ( 01 Aug 2022 07:08 )             42.3     08-01    141  |  110<H>  |  12  ----------------------------<  119<H>  4.1   |  28  |  0.83    Ca    9.1      01 Aug 2022 07:08  Mg     2.0     08-01    TPro  6.9  /  Alb  3.6  /  TBili  0.9  /  DBili  x   /  AST  26  /  ALT  33  /  AlkPhos  65  08-01      PHYSICAL EXAM:    Vital Signs Last 24 Hrs  T(C): 36.8 (01 Aug 2022 16:27), Max: 36.8 (01 Aug 2022 16:27)  T(F): 98.2 (01 Aug 2022 16:27), Max: 98.2 (01 Aug 2022 16:27)  HR: 91 (01 Aug 2022 18:54) (67 - 91)  BP: 158/90 (01 Aug 2022 18:54) (141/107 - 160/101)  BP(mean): 100 (01 Aug 2022 11:00) (100 - 100)  RR: 18 (01 Aug 2022 18:54) (18 - 19)  SpO2: 96% (01 Aug 2022 18:54) (96% - 100%)    Parameters below as of 01 Aug 2022 18:54  Patient On (Oxygen Delivery Method): room air      Daily Height in cm: 190.5 (01 Aug 2022 16:27)    Daily     Appearance: Alert, responsive, in no acute distress.    Skin: no rash on visible skin. Skin is clean, dry and intact. No bleeding. No abrasions. No ulcerations.    Vascular: 2+ distal pulses. Cap refill < 2 sec. No signs of venous insuffiency or stasis. No extremity ulcerations. No cyanosis.    Musculoskeletal:         Back: no midline C/T/L spine TTP.       Left Upper Extremity: Atraumatic with normal alignment NROM. No crepitus. No bony tenderness.        Right Upper Extremity: Atraumatic with normal alignment NROM. No crepitus. No bony tenderness.        Left Lower Extremity: Atraumatic with normal alignment NROM. No crepitus. No bony tenderness.        Right Lower Extremity: Atraumatic with normal alignment NROM. No crepitus. No bony tenderness.     Neurological: Sensation is grossly intact to light touch. No focal deficits or weaknesses found.    Pathologic reflexes: negative babinski , negative clonus           Motor exam: [  ]          [ ] Upper extremity                   Bi(c5)  WE(c6)  EE(c7)   FF(c8)                                                R         5/5        5/5        5/5       5/5                                               L          5/5        5/5        5/5       5/5         [ ] Lower extremeity                  HF(l2)   KE(l3)    TA(l4)   EHL(l5)  GS(s1)                                                 R        5/5        5/5        5/5       0/5         5/5                                               L         5/5        5/5       0/5       0/5          5/5       Imaging Studies: Pending MRIs.    A/P:  Pt is a  47y Male with h/o left foot drop, (following with neurosurgery Dr. Marks), recent diagnosis of babesiosis, transferred from [facility] for MRIs secondary to RLE weakness/paresthesias and saddle anesthesia.     PLAN d/w Dr. Hooper:  * Patient pending MRIs  * Patient follows with Dr. Marks (Neurosurgery- [facility]) and has made it very clear that he would like to be transferred back to [facility] to continue his care with his known doctors after the MRI is complete  * Call ortho phone for any further concerns or change in transfer planning   Pt Name: YANCY AVILA    MRN: [id number]      Patient is a 47y Male h/o left foot drop x 1 year presenting to the emergency department with a chief complaint of worsening RLE paresthesias/weakness x 3 weeks, with associated saddle anesthesia x 4 days. Pt states that he has been dealing with left foot swelling/numbness and a left foot drop x 1 year of unknown origin. Pt states that he has seen many neuro and ortho spine specialists that have continually told him that "his back is fine and no surgery recommended." Pt recently follows up with Dr. Marks (neurosurgery from [facility]) , who has requested that patient be transferred to [facility] to obtain MRIs. Of note, patient states that he was recently found to be positive for Babesiosis and has received one dose of abx, he is unsure how long he has been positive. Pt otherwise denies back/neck pain, fever/chills, c/p, sob, abd pain, n/v/c/d, dysuria, numbness/tingling/weakness and has no other complaints at this time. Pt admits to one episode of bowel incontinence and otherwise denies urinary incontinence but states that "it is hard to push when peeing." Pt has been ambulating with the use of a cane since his foot drop for approx 1 year.      HEALTH ISSUES - PROBLEM Dx:      .      REVIEW OF SYSTEMS      General: Alert, responsive, in NAD    Skin/Breast: No rashes, no pruritis   	  Ophthalmologic: No visual changes. No redness.   	  ENMT:	No discharge. No swelling.    Respiratory and Thorax: No difficulty breathing. No cough.  	   Cardiovascular:	No chest pain. No palpitations.    Gastrointestinal:	 No abdominal pain. No diarrhea.     Genitourinary: No dysuria. No bleeding.     Musculoskeletal: SEE HPI.    Neurological: + b/l LE paresthesias/numbness    Psychiatric: No anxiety or depression.    Hematology/Lymphatics: No swelling.    Endocrine: No Hx of diabetes.    ROS is otherwise negative.    PAST MEDICAL & SURGICAL HISTORY:  PAST MEDICAL & SURGICAL HISTORY:  Essential hypertension      Seasonal allergies      Mild intermittent asthma without complication      Iron deficiency anemia due to chronic blood loss      Lumbar herniated disc      Obstructive sleep apnea syndrome  non compliant with CPAP      Sarcoma      Foot drop, left      Morbid obesity      Port-A-Cath in place  right chest wall inserted 2018      Hearing loss  b/l      Anemia  history of anemia      Rectal cancer  completed chemotherapy      COVID-19 vaccine series completed  [employer]- March      Peripheral neuropathy  chemotherpay induced peripheral neuropathy      History of colon surgery  for rectal cancer- ileostomy creatio and reversal in 2019      H/O myringotomy  bilateral. 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      S/P colonoscopy      History of other surgery  port cath insertion 08/2018      Status post proctocolectomy  with temporary Ileostomy- reversed 2019          Allergies: iodinated radiocontrast agents (Rash; Urticaria; Flushing; Hives)      Medications:     FAMILY HISTORY:  Family history of hypertension (Father)    Family history of renal disease (Father)    : non-contributory    Social History: Denies ETOH abuse    Ambulation: Walking independently [ ] With Cane [ x ] With Walker [ ]  Bed / wheelchairbound [ ]                           14.1   5.38  )-----------( 138      ( 01 Aug 2022 07:08 )             42.3     08-01    141  |  110<H>  |  12  ----------------------------<  119<H>  4.1   |  28  |  0.83    Ca    9.1      01 Aug 2022 07:08  Mg     2.0     08-01    TPro  6.9  /  Alb  3.6  /  TBili  0.9  /  DBili  x   /  AST  26  /  ALT  33  /  AlkPhos  65  08-01      PHYSICAL EXAM:    Vital Signs Last 24 Hrs  T(C): 36.8 (01 Aug 2022 16:27), Max: 36.8 (01 Aug 2022 16:27)  T(F): 98.2 (01 Aug 2022 16:27), Max: 98.2 (01 Aug 2022 16:27)  HR: 91 (01 Aug 2022 18:54) (67 - 91)  BP: 158/90 (01 Aug 2022 18:54) (141/107 - 160/101)  BP(mean): 100 (01 Aug 2022 11:00) (100 - 100)  RR: 18 (01 Aug 2022 18:54) (18 - 19)  SpO2: 96% (01 Aug 2022 18:54) (96% - 100%)    Parameters below as of 01 Aug 2022 18:54  Patient On (Oxygen Delivery Method): room air      Daily Height in cm: 190.5 (01 Aug 2022 16:27)    Daily     Appearance: Alert, responsive, in no acute distress.    Skin: no rash on visible skin. Skin is clean, dry and intact. No bleeding. No abrasions. No ulcerations.    Vascular: 2+ distal pulses. Cap refill < 2 sec. No signs of venous insuffiency or stasis. No extremity ulcerations. No cyanosis.    Musculoskeletal:         Back: no midline C/T/L spine TTP.       Left Upper Extremity: Atraumatic with normal alignment NROM. No crepitus. No bony tenderness.        Right Upper Extremity: Atraumatic with normal alignment NROM. No crepitus. No bony tenderness.        Left Lower Extremity: Atraumatic with normal alignment NROM. No crepitus. No bony tenderness.        Right Lower Extremity: Atraumatic with normal alignment NROM. No crepitus. No bony tenderness.     Neurological: Sensation is grossly intact to light touch. No focal deficits or weaknesses found.    Pathologic reflexes: negative babinski , negative clonus           Motor exam: [  ]          [ ] Upper extremity                   Bi(c5)  WE(c6)  EE(c7)   FF(c8)                                                R         5/5        5/5        5/5       5/5                                               L          5/5        5/5        5/5       5/5         [ ] Lower extremeity                  HF(l2)   KE(l3)    TA(l4)   EHL(l5)  GS(s1)                                                 R        5/5        5/5        5/5       0/5         5/5                                               L         5/5        5/5       0/5       0/5          5/5       Imaging Studies: Pending MRIs.    A/P:  Pt is a  47y Male with h/o left foot drop, (following with neurosurgery Dr. Marks), recent diagnosis of babesiosis, transferred from [facility] for MRIs secondary to RLE weakness/paresthesias and saddle anesthesia.     PLAN d/w Dr. Hooper:  * Patient pending MRIs  * Patient follows with Dr. Marks (Neurosurgery- [facility]) and has made it very clear that he would like to be transferred back to [facility] to continue his care with his known doctors after the MRI is complete  * Call ortho phone for any further concerns or change in transfer planning    Addendum: MRI performed/resulted. D/w Dr. Bernabe- pt states that he would like his care to be continued primarily by Dr. Marks. Dr. Bernabe to discuss MRI findings with Dr. Marks for further plan -- transfer vs. outpt f/u.

## 2022-08-01 NOTE — ED PROVIDER NOTE - PHYSICAL EXAMINATION
Foot drop of the left foot with inability to dorsiflex.  Intact plantar and dorsiflexion of the right foot.  Nonpitting edema of the right foot and ankle

## 2022-08-01 NOTE — ED ADULT TRIAGE NOTE - CHIEF COMPLAINT QUOTE
Pt BIBA transfer from [facility] for bigger size MRI machine, pt experiencing numbness/weakness to left lower leg, pt thinks its because of tick bite, recently diagnosed with babeosis, ambulates w/ cane

## 2022-08-01 NOTE — ED PROVIDER NOTE - OBJECTIVE STATEMENT
47-year-old male with history of rectal cancer status post resection, chemotherapy, radiation therapy 4 years ago currently in remission presents with complaints of generalized weakness, weakness of the left foot for several months and now weakness of the right foot for the past week and a half.  Patient notes that a few days ago he had an episode of fecal incontinence and he has been having urinary retention over the past few days.  Patient was evaluated by his primary care doctor for general malaise and had a tickborne panel that was positive for babesiosis.  The result came back 5 days ago and he was not treated.  He was referred to infectious disease but has not been started on any medication for this infection.  Patient currently denies any fever/chills, chest pain, shortness of breath, abdominal pain or back pain.  Patient notes that he has been having progressively increasing difficulty walking due to the left foot drop and weakness in the bilateral lower extremity as well as progressively increased swelling of the right leg.  Patient has followed up with Dr. Marks of neurosurgery and had an MRI of his lumbar spine in April 2022 which showed some herniated disc but no other abnormalities.

## 2022-08-01 NOTE — ED ADULT TRIAGE NOTE - CHIEF COMPLAINT QUOTE
Pt presents to the ED c/o numbness. Pt reports developing numbness in his left leg 1 year ago, right leg 1.5 weeks ago, and groin/back/buttock 2 days ago. Pt states he had an episode of incontinence 2 nights ago which is new. Pt sees neurologist Dr. Marks. ALMA negative.

## 2022-08-01 NOTE — ED ADULT NURSE NOTE - OBJECTIVE STATEMENT
c/o numbness to right foot started last week, c/o right foot swelling started 1 month ago, c/o left foot numbness started 1 year ago, patient states he was diagnosed with babesiosis last week, patient states he has history of colon cancer and renal cancer, report incontinent episode of stool yesterday, loose bowel movement, has not had bowel movement since.

## 2022-08-01 NOTE — ED PROVIDER NOTE - CLINICAL SUMMARY MEDICAL DECISION MAKING FREE TEXT BOX
spoviralke with dr forrest no neurosurgical intervention indicated . pt adamantly requesting to be transferred back to  for neurology involvement in his case spoke with transfer center and jose rafael agree wo this plan o care

## 2022-08-02 ENCOUNTER — RESULT REVIEW (OUTPATIENT)
Age: 47
End: 2022-08-02

## 2022-08-02 ENCOUNTER — INPATIENT (INPATIENT)
Facility: HOSPITAL | Age: 47
LOS: 5 days | Discharge: HOME | DRG: 98 | End: 2022-08-08
Attending: FAMILY MEDICINE | Admitting: HOSPITALIST
Payer: COMMERCIAL

## 2022-08-02 VITALS
HEART RATE: 77 BPM | DIASTOLIC BLOOD PRESSURE: 93 MMHG | HEIGHT: 75 IN | RESPIRATION RATE: 18 BRPM | TEMPERATURE: 98 F | OXYGEN SATURATION: 96 % | WEIGHT: 309.97 LBS | SYSTOLIC BLOOD PRESSURE: 153 MMHG

## 2022-08-02 VITALS
TEMPERATURE: 98 F | OXYGEN SATURATION: 100 % | HEART RATE: 75 BPM | DIASTOLIC BLOOD PRESSURE: 89 MMHG | SYSTOLIC BLOOD PRESSURE: 145 MMHG

## 2022-08-02 DIAGNOSIS — Z91.041 RADIOGRAPHIC DYE ALLERGY STATUS: ICD-10-CM

## 2022-08-02 DIAGNOSIS — G62.0 DRUG-INDUCED POLYNEUROPATHY: ICD-10-CM

## 2022-08-02 DIAGNOSIS — Z20.822 CONTACT WITH AND (SUSPECTED) EXPOSURE TO COVID-19: ICD-10-CM

## 2022-08-02 DIAGNOSIS — G37.3 ACUTE TRANSVERSE MYELITIS IN DEMYELINATING DISEASE OF CENTRAL NERVOUS SYSTEM: ICD-10-CM

## 2022-08-02 DIAGNOSIS — Z98.890 OTHER SPECIFIED POSTPROCEDURAL STATES: Chronic | ICD-10-CM

## 2022-08-02 DIAGNOSIS — Z90.49 ACQUIRED ABSENCE OF OTHER SPECIFIED PARTS OF DIGESTIVE TRACT: Chronic | ICD-10-CM

## 2022-08-02 DIAGNOSIS — H91.93 UNSPECIFIED HEARING LOSS, BILATERAL: ICD-10-CM

## 2022-08-02 DIAGNOSIS — Z41.9 ENCOUNTER FOR PROCEDURE FOR PURPOSES OTHER THAN REMEDYING HEALTH STATE, UNSPECIFIED: Chronic | ICD-10-CM

## 2022-08-02 DIAGNOSIS — Z90.89 ACQUIRED ABSENCE OF OTHER ORGANS: Chronic | ICD-10-CM

## 2022-08-02 DIAGNOSIS — G04.91 MYELITIS, UNSPECIFIED: ICD-10-CM

## 2022-08-02 LAB
ALBUMIN SERPL ELPH-MCNC: 3.8 G/DL — SIGNIFICANT CHANGE UP (ref 3.3–5)
ALP SERPL-CCNC: 67 U/L — SIGNIFICANT CHANGE UP (ref 40–120)
ALT FLD-CCNC: 42 U/L — SIGNIFICANT CHANGE UP (ref 12–78)
ANION GAP SERPL CALC-SCNC: 4 MMOL/L — LOW (ref 5–17)
AST SERPL-CCNC: 33 U/L — SIGNIFICANT CHANGE UP (ref 15–37)
B BURGDOR C6 AB SER-ACNC: NEGATIVE — SIGNIFICANT CHANGE UP
B BURGDOR IGG+IGM SER-ACNC: 0.17 INDEX — SIGNIFICANT CHANGE UP (ref 0.01–0.89)
BASOPHILS # BLD AUTO: 0.03 K/UL — SIGNIFICANT CHANGE UP (ref 0–0.2)
BASOPHILS NFR BLD AUTO: 0.6 % — SIGNIFICANT CHANGE UP (ref 0–2)
BILIRUB SERPL-MCNC: 1.1 MG/DL — SIGNIFICANT CHANGE UP (ref 0.2–1.2)
BUN SERPL-MCNC: 14 MG/DL — SIGNIFICANT CHANGE UP (ref 7–23)
CALCIUM SERPL-MCNC: 9 MG/DL — SIGNIFICANT CHANGE UP (ref 8.5–10.1)
CHLORIDE SERPL-SCNC: 109 MMOL/L — HIGH (ref 96–108)
CO2 SERPL-SCNC: 26 MMOL/L — SIGNIFICANT CHANGE UP (ref 22–31)
CREAT SERPL-MCNC: 0.85 MG/DL — SIGNIFICANT CHANGE UP (ref 0.5–1.3)
CULTURE RESULTS: SIGNIFICANT CHANGE UP
CULTURE RESULTS: SIGNIFICANT CHANGE UP
EGFR: 107 ML/MIN/1.73M2 — SIGNIFICANT CHANGE UP
EOSINOPHIL # BLD AUTO: 0.08 K/UL — SIGNIFICANT CHANGE UP (ref 0–0.5)
EOSINOPHIL NFR BLD AUTO: 1.5 % — SIGNIFICANT CHANGE UP (ref 0–6)
GLUCOSE SERPL-MCNC: 119 MG/DL — HIGH (ref 70–99)
HCT VFR BLD CALC: 42.8 % — SIGNIFICANT CHANGE UP (ref 39–50)
HGB BLD-MCNC: 14.4 G/DL — SIGNIFICANT CHANGE UP (ref 13–17)
IMM GRANULOCYTES NFR BLD AUTO: 0.4 % — SIGNIFICANT CHANGE UP (ref 0–1.5)
LYMPHOCYTES # BLD AUTO: 0.83 K/UL — LOW (ref 1–3.3)
LYMPHOCYTES # BLD AUTO: 15.8 % — SIGNIFICANT CHANGE UP (ref 13–44)
MCHC RBC-ENTMCNC: 28.7 PG — SIGNIFICANT CHANGE UP (ref 27–34)
MCHC RBC-ENTMCNC: 33.6 GM/DL — SIGNIFICANT CHANGE UP (ref 32–36)
MCV RBC AUTO: 85.4 FL — SIGNIFICANT CHANGE UP (ref 80–100)
MONOCYTES # BLD AUTO: 0.29 K/UL — SIGNIFICANT CHANGE UP (ref 0–0.9)
MONOCYTES NFR BLD AUTO: 5.5 % — SIGNIFICANT CHANGE UP (ref 2–14)
NEUTROPHILS # BLD AUTO: 4 K/UL — SIGNIFICANT CHANGE UP (ref 1.8–7.4)
NEUTROPHILS NFR BLD AUTO: 76.2 % — SIGNIFICANT CHANGE UP (ref 43–77)
PLATELET # BLD AUTO: 137 K/UL — LOW (ref 150–400)
POTASSIUM SERPL-MCNC: 4 MMOL/L — SIGNIFICANT CHANGE UP (ref 3.5–5.3)
POTASSIUM SERPL-SCNC: 4 MMOL/L — SIGNIFICANT CHANGE UP (ref 3.5–5.3)
PROT SERPL-MCNC: 7.5 GM/DL — SIGNIFICANT CHANGE UP (ref 6–8.3)
RBC # BLD: 5.01 M/UL — SIGNIFICANT CHANGE UP (ref 4.2–5.8)
RBC # FLD: 12.7 % — SIGNIFICANT CHANGE UP (ref 10.3–14.5)
SARS-COV-2 RNA SPEC QL NAA+PROBE: SIGNIFICANT CHANGE UP
SODIUM SERPL-SCNC: 139 MMOL/L — SIGNIFICANT CHANGE UP (ref 135–145)
SPECIMEN SOURCE: SIGNIFICANT CHANGE UP
SPECIMEN SOURCE: SIGNIFICANT CHANGE UP
WBC # BLD: 5.25 K/UL — SIGNIFICANT CHANGE UP (ref 3.8–10.5)
WBC # FLD AUTO: 5.25 K/UL — SIGNIFICANT CHANGE UP (ref 3.8–10.5)

## 2022-08-02 PROCEDURE — 82525 ASSAY OF COPPER: CPT

## 2022-08-02 PROCEDURE — 62328 DX LMBR SPI PNXR W/FLUOR/CT: CPT

## 2022-08-02 PROCEDURE — 84157 ASSAY OF PROTEIN OTHER: CPT

## 2022-08-02 PROCEDURE — 99223 1ST HOSP IP/OBS HIGH 75: CPT

## 2022-08-02 PROCEDURE — 86039 ANTINUCLEAR ANTIBODIES (ANA): CPT

## 2022-08-02 PROCEDURE — 84181 WESTERN BLOT TEST: CPT

## 2022-08-02 PROCEDURE — 87070 CULTURE OTHR SPECIMN AEROBIC: CPT

## 2022-08-02 PROCEDURE — 84166 PROTEIN E-PHORESIS/URINE/CSF: CPT

## 2022-08-02 PROCEDURE — 36415 COLL VENOUS BLD VENIPUNCTURE: CPT

## 2022-08-02 PROCEDURE — U0005: CPT

## 2022-08-02 PROCEDURE — 88108 CYTOPATH CONCENTRATE TECH: CPT

## 2022-08-02 PROCEDURE — U0003: CPT

## 2022-08-02 PROCEDURE — 86780 TREPONEMA PALLIDUM: CPT

## 2022-08-02 PROCEDURE — 93010 ELECTROCARDIOGRAM REPORT: CPT

## 2022-08-02 PROCEDURE — 83516 IMMUNOASSAY NONANTIBODY: CPT

## 2022-08-02 PROCEDURE — 83520 IMMUNOASSAY QUANT NOS NONAB: CPT

## 2022-08-02 PROCEDURE — 86592 SYPHILIS TEST NON-TREP QUAL: CPT

## 2022-08-02 PROCEDURE — 88108 CYTOPATH CONCENTRATE TECH: CPT | Mod: 26

## 2022-08-02 PROCEDURE — 83916 OLIGOCLONAL BANDS: CPT

## 2022-08-02 PROCEDURE — 82945 GLUCOSE OTHER FLUID: CPT

## 2022-08-02 PROCEDURE — 86789 WEST NILE VIRUS ANTIBODY: CPT

## 2022-08-02 PROCEDURE — 86256 FLUORESCENT ANTIBODY TITER: CPT

## 2022-08-02 PROCEDURE — 99222 1ST HOSP IP/OBS MODERATE 55: CPT

## 2022-08-02 PROCEDURE — 87483 CNS DNA AMP PROBE TYPE 12-25: CPT

## 2022-08-02 PROCEDURE — 85652 RBC SED RATE AUTOMATED: CPT

## 2022-08-02 PROCEDURE — 86225 DNA ANTIBODY NATIVE: CPT

## 2022-08-02 PROCEDURE — 86140 C-REACTIVE PROTEIN: CPT

## 2022-08-02 PROCEDURE — 83090 ASSAY OF HOMOCYSTEINE: CPT

## 2022-08-02 PROCEDURE — 84443 ASSAY THYROID STIM HORMONE: CPT

## 2022-08-02 PROCEDURE — 94660 CPAP INITIATION&MGMT: CPT

## 2022-08-02 PROCEDURE — 86334 IMMUNOFIX E-PHORESIS SERUM: CPT

## 2022-08-02 PROCEDURE — 86788 WEST NILE VIRUS AB IGM: CPT

## 2022-08-02 PROCEDURE — 99285 EMERGENCY DEPT VISIT HI MDM: CPT

## 2022-08-02 PROCEDURE — 82607 VITAMIN B-12: CPT

## 2022-08-02 PROCEDURE — 89051 BODY FLUID CELL COUNT: CPT

## 2022-08-02 PROCEDURE — 87798 DETECT AGENT NOS DNA AMP: CPT

## 2022-08-02 PROCEDURE — 85730 THROMBOPLASTIN TIME PARTIAL: CPT

## 2022-08-02 PROCEDURE — 62328 DX LMBR SPI PNXR W/FLUOR/CT: CPT | Mod: 26

## 2022-08-02 PROCEDURE — 82164 ANGIOTENSIN I ENZYME TEST: CPT

## 2022-08-02 PROCEDURE — 83873 ASSAY OF CSF PROTEIN: CPT

## 2022-08-02 PROCEDURE — 85610 PROTHROMBIN TIME: CPT

## 2022-08-02 PROCEDURE — 99221 1ST HOSP IP/OBS SF/LOW 40: CPT

## 2022-08-02 PROCEDURE — 86431 RHEUMATOID FACTOR QUANT: CPT

## 2022-08-02 PROCEDURE — 86255 FLUORESCENT ANTIBODY SCREEN: CPT

## 2022-08-02 PROCEDURE — 86618 LYME DISEASE ANTIBODY: CPT

## 2022-08-02 RX ORDER — POLYETHYLENE GLYCOL 3350 17 G/17G
17 POWDER, FOR SOLUTION ORAL
Refills: 0 | Status: DISCONTINUED | OUTPATIENT
Start: 2022-08-02 | End: 2022-08-08

## 2022-08-02 RX ORDER — LISINOPRIL 2.5 MG/1
20 TABLET ORAL DAILY
Refills: 0 | Status: DISCONTINUED | OUTPATIENT
Start: 2022-08-02 | End: 2022-08-08

## 2022-08-02 RX ORDER — ACETAMINOPHEN 500 MG
650 TABLET ORAL EVERY 6 HOURS
Refills: 0 | Status: DISCONTINUED | OUTPATIENT
Start: 2022-08-02 | End: 2022-08-08

## 2022-08-02 RX ORDER — PSYLLIUM SEED (WITH DEXTROSE)
1 POWDER (GRAM) ORAL
Qty: 0 | Refills: 0 | DISCHARGE

## 2022-08-02 RX ORDER — ONDANSETRON 8 MG/1
4 TABLET, FILM COATED ORAL EVERY 8 HOURS
Refills: 0 | Status: DISCONTINUED | OUTPATIENT
Start: 2022-08-02 | End: 2022-08-08

## 2022-08-02 RX ORDER — LANOLIN ALCOHOL/MO/W.PET/CERES
3 CREAM (GRAM) TOPICAL AT BEDTIME
Refills: 0 | Status: DISCONTINUED | OUTPATIENT
Start: 2022-08-02 | End: 2022-08-08

## 2022-08-02 RX ORDER — POLYETHYLENE GLYCOL 3350 17 G/17G
1 POWDER, FOR SOLUTION ORAL
Qty: 0 | Refills: 0 | DISCHARGE

## 2022-08-02 RX ORDER — LISINOPRIL 2.5 MG/1
1 TABLET ORAL
Qty: 0 | Refills: 0 | DISCHARGE

## 2022-08-02 RX ADMIN — LISINOPRIL 20 MILLIGRAM(S): 2.5 TABLET ORAL at 13:54

## 2022-08-02 RX ADMIN — POLYETHYLENE GLYCOL 3350 17 GRAM(S): 17 POWDER, FOR SOLUTION ORAL at 21:35

## 2022-08-02 RX ADMIN — Medication 3 MILLIGRAM(S): at 21:35

## 2022-08-02 NOTE — CONSULT NOTE ADULT - SUBJECTIVE AND OBJECTIVE BOX
Patient is a 47y old  Male who presents with a chief complaint of   HPI:  47 year old obese man with a PMHx of rectal cancer s/p resection and chemotherapy 4 years ago, peripheral neuropathy, chronic left foot drop for more than 1yr, Htn,  kidney cyst recent diagnosis of renal cancer, left lower extremity sarcoma s/p muscle resection from lower extremity, hypertension, rocio, admitted on  for evaluation of bilateral leg weakness and numbness for preceding months to a year and recently developed edema of legs, greater in the left leg limited to the lower parts of the legs; walks with a cane, also noted with worsening vision over last few weeks. Denies fever or chills, has been in the woods, but denies ticks or bugs bites; travel just to [place]. Has dog, works in finance, 2 young children ages 9 and 11 years old; was COVID-19 vaccinated times 2. 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 650 milliGRAM(s) Oral every 6 hours PRN Temp greater or equal to 38C (100.4F), Mild Pain (1 - 3)  aluminum hydroxide/magnesium hydroxide/simethicone Suspension 30 milliLiter(s) Oral every 4 hours PRN Dyspepsia  melatonin 3 milliGRAM(s) Oral at bedtime PRN Insomnia  ondansetron Injectable 4 milliGRAM(s) IV Push every 8 hours PRN Nausea and/or Vomiting    Allergies    iodinated radiocontrast agents (Rash; Urticaria; Flushing; Hives)    Intolerances      Social: no smoking, no alcohol, no illegal drugs; no recent travel, no exposure to TB  FAMILY HISTORY:  Family history of hypertension (Father)    Family history of renal disease (Father)         ROS: the patient denies fever, no chills, no HA, no dizziness, no sore throat, no blurry vision, no CP, no palpitations, no SOB, no cough, no abdominal pain, no diarrhea, no N/V, no dysuria, no leg pain, no claudication, no rash, no joint aches, no rectal pain or bleeding, no night sweats  All other systems reviewed and are negative    Vital Signs Last 24 Hrs  T(C): 36.4 (02 Aug 2022 14:52), Max: 37.1 (02 Aug 2022 10:24)  T(F): 97.5 (02 Aug 2022 14:52), Max: 98.7 (02 Aug 2022 10:24)  HR: 76 (02 Aug 2022 14:) (71 - 91)  BP: 134/87 (02 Aug 2022 14:) (132/92 - 158/90)  BP(mean): --  RR: 18 (02 Aug 2022 14:) (18 - 20)  SpO2: 98% (02 Aug 2022 14:) (96% - 100%)    Parameters below as of 02 Aug 2022 14:  Patient On (Oxygen Delivery Method): room air      Daily Height in cm: 190.5 (02 Aug 2022 14:52)    Daily Weight in k.7 (02 Aug 2022 14:52)    PE:    Constitutional: frail looking  HEENT: NC/AT, EOMI, PERRLA, conjunctivae clear; ears and nose atraumatic; pharynx clear  Neck: supple; thyroid not palpable  Back: no tenderness  Respiratory: respiratory effort normal; clear to auscultation  Cardiovascular: S1S2 regular, no murmurs  Abdomen: soft, not tender, not distended, positive BS; no liver or spleen organomegaly  Genitourinary: no suprapubic tenderness  Musculoskeletal: no muscle tenderness, mild peripheral lower extremity edema  Neurological/ Psychiatric: AxOx3, judgement and insight normal;  moving all extremities  Skin: no rashes; no palpable lesions    Labs: all available labs reviewed                        14.   5.25  )-----------( 137      ( 02 Aug 2022 07:17 )             42.8     08-02    139  |  109<H>  |  14  ----------------------------<  119<H>  4.0   |  26  |  0.85    Ca    9.0      02 Aug 2022 07:17  Mg     2.0     08-    TPro  7.5  /  Alb  3.8  /  TBili  1.1  /  DBili  x   /  AST  33  /  ALT  42  /  AlkPhos  67  08-02     LIVER FUNCTIONS - ( 02 Aug 2022 07:17 )  Alb: 3.8 g/dL / Pro: 7.5 gm/dL / ALK PHOS: 67 U/L / ALT: 42 U/L / AST: 33 U/L / GGT: x           Urinalysis Basic - ( 01 Aug 2022 09:08 )    Color: Yellow / Appearance: Clear / S.020 / pH: x  Gluc: x / Ketone: Negative  / Bili: Negative / Urobili: Negative   Blood: x / Protein: Negative / Nitrite: Negative   Leuk Esterase: Negative / RBC: 0-2 /HPF / WBC 0-2   Sq Epi: x / Non Sq Epi: Few / Bacteria: Occasional      Borrelia burgdorferi IgG/IgM Antibodies (22 @ 23:55)    LYME IgG/IgM Antibodies Result: 0.17 Index    Lyme C6 Interpretation: Negative: A negative result may occur in patients recently (< 14 days) infected  with Borrelia burgdorferi. If early Lyme disease is suspected, consider  collecting a new sample 2 – 4 weeks later for repeat testing.  Reference Range: (expressed as Index values)  < 0.90             Negative  0.90 - 1.09      Equivocal  >= 1.10           Positive  CDC/ASTPHLD Guidelines recommend that all samples judged equivocal or  positive be re-tested by confirmatory immunoassays.  Method: Chemiluminescent Immunoassay        < from: MR Lumbar Spine No Cont (22 @ 20:55) >    IMPRESSION:  1. Poorly marginated STIR and T2 hyperintensity involving the distal cord   and  conus could reflect cord edema versus myelomalacia/gliosis and   correlation with  clinical data is requested.  2. Broad-based posterior disc bulges are seen atL3-L4 and L4-L5 with the  central canal adequate at these and all remaining lumbar levels.  Right  foraminal disc protrusion at L4-L5 results in right foraminal narrowing   with  neural foramina adequate at remaining lumbar levels.    Final report: Correlation with thoracic spine obtained concurrently   demonstrates increased signal in the cord the conus which is nonspecific   but may represent infection inflammation or demyelination. Recommend   repeat thoracic spine with contrast.    < end of copied text >          < from: MR Thoracic Spine No Cont (22 @ 20:39) >    IMPRESSION:  1. Poorly marginated STIR and T2 hyperintensity is seen involving the   distal  cord and conus with a suggestion of possible cord expansion and this could  reflect cord edema versus myelomalacia/gliosis.  No intramedullary signal  abnormalities are detected at remaining thoracic levels and there is no  evidence of thoracic cord compression seen at any level.  2. Posterior central disc protrusions/disc bulging seen between T7-8 and   T10-11  with no canal stenosis or cord compression detected at these or remaining  thoracic levels.    Final report:  Agree with preliminary report.  Increased signal intensity in the conus with cord expansion, differential   diagnosis includes infectious inflammatory demyelinating, recommend   correlation with postcontrast scan.    < end of copied text >                  Radiology: all available radiological tests reviewed    Advanced directives addressed: full resuscitation

## 2022-08-02 NOTE — CONSULT NOTE ADULT - SUBJECTIVE AND OBJECTIVE BOX
Neurosurgery:     47y Male h/o left foot drop x 1 year presenting to the emergency department with a chief complaint of worsening RLE paresthesias/weakness x 3 weeks, with associated saddle anesthesia x 4 days.  Pt was transferred to [facility] for MRI study of Lspine from ER to ER and returns to [place] for further medical management and care.      PAST MEDICAL & SURGICAL HISTORY:  Essential hypertension  Seasonal allergies  Mild intermittent asthma without complication  Iron deficiency anemia due to chronic blood loss  Lumbar herniated disc  Obstructive sleep apnea syndrome : non compliant with CPAP  Sarcoma  Foot drop, left  Morbid obesity  Port-A-Cath in place : right chest wall inserted 2018  Hearing loss b/l  Anemia  Rectal cancer : completed chemotherapy  COVID-19 vaccine series completed - Pfizer- March  Peripheral Neuropathy - Chemotherapy induced peripheral neuropathy  History of colon surgery for rectal cancer- ileostomy creation and reversal in 2019      H/O myringotomy  S/P ( tonsillectomy and adenoidectomy)  Elective surgery : 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   Review of Symptoms: as per HPI full review of ROS    Vital Signs Last 24 Hrs  T(C): 36.6 (02 Aug 2022 06:08), Max: 36.9 (02 Aug 2022 03:57)  T(F): 97.8 (02 Aug 2022 06:08), Max: 98.5 (02 Aug 2022 03:57)  HR: 77 (02 Aug 2022 06:08) (67 - 91)  BP: 153/93 (02 Aug 2022 06:08) (141/107 - 158/90)  BP(mean): 100 (01 Aug 2022 11:00) (100 - 100)  RR: 18 (02 Aug 2022 06:08) (18 - 20)  SpO2: 96% (02 Aug 2022 06:08) (96% - 100%)    Parameters below as of 02 Aug 2022 06:08  Patient On (Oxygen Delivery Method): room air        Labs:                        14.4   5.25  )-----------( 137      ( 02 Aug 2022 07:17 )             42.8     08-01    141  |  110<H>  |  12  ----------------------------<  119<H>  4.1   |  28  |  0.83    Ca    9.1      01 Aug 2022 07:08  Mg     2.0     08-01    TPro  6.9  /  Alb  3.6  /  TBili  0.9  /  DBili  x   /  AST  26  /  ALT  33  /  AlkPhos  65  08-01    Physical Exam:  Constitutional: Awake / alert  HEENT: PERRLA, EOMI  Neck: Supple  Respiratory: Breath sounds are clear bilaterally  Cardiovascular: S1 and S2, regular rhythm  Gastrointestinal: Soft, NT/ND  Extremities:  no edema  Vascular: No carotid Bruit  Musculoskeletal: no joint swelling/tenderness, no abnormal movements  Skin: No rashes    Neurological Exam:  HF: A x O x 3, appropriately interactive, normal affect, speech fluent, no aphasia or paraphasic errors. Naming /repetition intact   CN: PERRL, EOMI, VFF, facial sensation normal, no NLFD, tongue midline  Motor: LE global weakness 4/5.  + reports of incontinence.  Sensate intact to LT/PP throughout  DTRs / Coord intact.    A/P:  47y Male h/o left foot drop x 1 year presenting to the emergency department with a chief complaint of worsening RLE paresthesias/weakness x 3 weeks, with associated saddle anesthesia x 4 days.    - Pt has conus signal on flair MRI studies must r/o transverse myelitis etc  - Neurology consult and eval  - no neurosurgical compressive phenomena, more intrinsic cord signal changes r/o pathologic / viral process  - d/w and films reveiwed by Dr. Marks - neurosurgeon accordingly  - No urgent / emergent intervention warranted  - continue medical work up and therapy  - will defer steroid admin to neurology team  - d/w and all films / pts status reviewed by Dr. Marks accordingly   Neurosurgery:     47y Male h/o left foot drop x 1 year presenting to the emergency department with a chief complaint of worsening RLE paresthesias/weakness x 3 weeks, with associated saddle anesthesia x 4 days.  Pt was transferred to [facility] for MRI study of Lspine from ER to ER and returns to [place] for further medical management and care.      PAST MEDICAL & SURGICAL HISTORY:  Essential hypertension  Seasonal allergies  Mild intermittent asthma without complication  Iron deficiency anemia due to chronic blood loss  Lumbar herniated disc  Obstructive sleep apnea syndrome : non compliant with CPAP  Sarcoma  Foot drop, left  Morbid obesity  Port-A-Cath in place : right chest wall inserted 2018  Hearing loss b/l  Anemia  Rectal cancer : completed chemotherapy  COVID-19 vaccine series completed - Pfizer- March  Peripheral Neuropathy - Chemotherapy induced peripheral neuropathy  History of colon surgery for rectal cancer- ileostomy creation and reversal in 2019      H/O myringotomy  S/P ( tonsillectomy and adenoidectomy)  Elective surgery : 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   Review of Symptoms: as per HPI full review of ROS    Vital Signs Last 24 Hrs  T(C): 36.6 (02 Aug 2022 06:08), Max: 36.9 (02 Aug 2022 03:57)  T(F): 97.8 (02 Aug 2022 06:08), Max: 98.5 (02 Aug 2022 03:57)  HR: 77 (02 Aug 2022 06:08) (67 - 91)  BP: 153/93 (02 Aug 2022 06:08) (141/107 - 158/90)  BP(mean): 100 (01 Aug 2022 11:00) (100 - 100)  RR: 18 (02 Aug 2022 06:08) (18 - 20)  SpO2: 96% (02 Aug 2022 06:08) (96% - 100%)    Parameters below as of 02 Aug 2022 06:08  Patient On (Oxygen Delivery Method): room air        Labs:                        14.4   5.25  )-----------( 137      ( 02 Aug 2022 07:17 )             42.8     08-01    141  |  110<H>  |  12  ----------------------------<  119<H>  4.1   |  28  |  0.83    Ca    9.1      01 Aug 2022 07:08  Mg     2.0     08-01    TPro  6.9  /  Alb  3.6  /  TBili  0.9  /  DBili  x   /  AST  26  /  ALT  33  /  AlkPhos  65  08-01    Physical Exam:  Constitutional: Awake / alert  HEENT: PERRLA, EOMI  Neck: Supple  Respiratory: Breath sounds are clear bilaterally  Cardiovascular: S1 and S2, regular rhythm  Gastrointestinal: Soft, NT/ND  Extremities:  no edema  Vascular: No carotid Bruit  Musculoskeletal: no joint swelling/tenderness, no abnormal movements  Skin: No rashes    Neurological Exam:  HF: A x O x 3, appropriately interactive, normal affect, speech fluent, no aphasia or paraphasic errors. Naming /repetition intact   CN: PERRL, EOMI, VFF, facial sensation normal, no NLFD, tongue midline  Motor: LE global weakness 4/5.  + reports of incontinence.  Sensate intact to LT/PP throughout  DTRs / Coord intact.    A/P:  47y Male h/o left foot drop x 1 year presenting to the emergency department with a chief complaint of worsening RLE paresthesias/weakness x 3 weeks, with associated saddle anesthesia x 4 days.    - Pt has conus signal on flair MRI studies must r/o transverse myelitis etc possible enhancing conus mass?  - mri with contrast may be of benefit to eval if any enhancement with colon CA / RT treatment / which would mean txr to [facility]  - Neurology consult and eval  - no neurosurgical compressive phenomena, more intrinsic cord signal changes r/o pathologic / viral process  - d/w and films reveiwed by Dr. Marks - neurosurgeon accordingly  - No urgent / emergent intervention warranted  - continue medical work up and therapy  - will defer steroid admin to neurology team  - d/w and all films / pts status reviewed by Dr. Marks accordingly   Neurosurgery:     47y Male h/o left foot drop x 1 year presenting to the emergency department with a chief complaint of worsening RLE paresthesias/weakness x 3 weeks, with associated saddle anesthesia x 4 days.  Pt was transferred to [facility] for MRI study of Lspine from ER to ER and returns to [place] for further medical management and care.      PAST MEDICAL & SURGICAL HISTORY:  Essential hypertension  Seasonal allergies  Mild intermittent asthma without complication  Iron deficiency anemia due to chronic blood loss  Lumbar herniated disc  Obstructive sleep apnea syndrome : non compliant with CPAP  Sarcoma  Foot drop, left  Morbid obesity  Port-A-Cath in place : right chest wall inserted 2018  Hearing loss b/l  Anemia  Rectal cancer : completed chemotherapy  COVID-19 vaccine series completed - Pfizer- March  Peripheral Neuropathy - Chemotherapy induced peripheral neuropathy  History of colon surgery for rectal cancer- ileostomy creation and reversal in 2019      H/O myringotomy  S/P ( tonsillectomy and adenoidectomy)  Elective surgery : 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   Review of Symptoms: as per HPI full review of ROS    Vital Signs Last 24 Hrs  T(C): 36.6 (02 Aug 2022 06:08), Max: 36.9 (02 Aug 2022 03:57)  T(F): 97.8 (02 Aug 2022 06:08), Max: 98.5 (02 Aug 2022 03:57)  HR: 77 (02 Aug 2022 06:08) (67 - 91)  BP: 153/93 (02 Aug 2022 06:08) (141/107 - 158/90)  BP(mean): 100 (01 Aug 2022 11:00) (100 - 100)  RR: 18 (02 Aug 2022 06:08) (18 - 20)  SpO2: 96% (02 Aug 2022 06:08) (96% - 100%)    Parameters below as of 02 Aug 2022 06:08  Patient On (Oxygen Delivery Method): room air        Labs:                        14.4   5.25  )-----------( 137      ( 02 Aug 2022 07:17 )             42.8     08-01    141  |  110<H>  |  12  ----------------------------<  119<H>  4.1   |  28  |  0.83    Ca    9.1      01 Aug 2022 07:08  Mg     2.0     08-01    TPro  6.9  /  Alb  3.6  /  TBili  0.9  /  DBili  x   /  AST  26  /  ALT  33  /  AlkPhos  65  08-01    Physical Exam:  Constitutional: Awake / alert  HEENT: PERRLA, EOMI  Neck: Supple  Respiratory: Breath sounds are clear bilaterally  Cardiovascular: S1 and S2, regular rhythm  Gastrointestinal: Soft, NT/ND  Extremities:  no edema  Vascular: No carotid Bruit  Musculoskeletal: no joint swelling/tenderness, no abnormal movements  Skin: No rashes    Neurological Exam:  HF: A x O x 3, appropriately interactive, normal affect, speech fluent, no aphasia or paraphasic errors. Naming /repetition intact   CN: PERRL, EOMI, VFF, facial sensation normal, no NLFD, tongue midline  Motor: LE global weakness 4/5.  + reports of incontinence.  Sensate intact to LT/PP throughout  DTRs / Coord intact.    A/P:  47y Male h/o left foot drop x 1 year presenting to the emergency department with a chief complaint of worsening RLE paresthesias/weakness x 3 weeks, with associated saddle anesthesia x 4 days.    - Pt has conus signal on flair MRI studies must r/o transverse myelitis etc possible enhancing conus mass?  - neurology / medical work up is priority at this time  - Outpt mri with contrast may be of benefit to eval if any enhancement regarding conus edema / ? lesion after full medical / neurology work up is completed.  - no neurosurgical compressive phenomena, more intrinsic cord signal changes r/o pathologic / viral process  - d/w and films reveiwed by Dr. Marks - neurosurgeon accordingly  - No urgent / emergent intervention warranted  - continue medical work up and therapy  - will defer steroid admin to neurology team  - d/w and all films / pts status reviewed by Dr. Marks accordingly

## 2022-08-02 NOTE — PATIENT PROFILE ADULT - FUNCTIONAL SCREEN CURRENT LEVEL: SWALLOWING (IF SCORE 2 OR MORE FOR ANY ITEM, CONSULT REHAB SERVICES), MLM)
Pt remains on DVC status at this time. Ambulated with walker; gait remains unsteady.Pt is wearing C-collar and is maintained on falls precautions. Calm and cooperative. Hyperverbal and rambling at times but pleasant upon approach. Denies SI/HI. Denies AH but admitted that he saw a mouse run out of his room this morning and acknowledged that it was not a real mouse. Pt was able to verbally contract for safety. Complained of neck pain and was given hydrocodone PO PRN- see MAR. Pt was educated about the importance of checking blood glucose at home and maintaining a carbohydrate consistent diet. Signs and symptoms of hypoglycemia also reviewed. Pt verbalized understanding. Appearance is disheveled and malodorous. NAD observed. Will cont to monitor.   0 = swallows foods/liquids without difficulty

## 2022-08-02 NOTE — ED ADULT NURSE NOTE - CHIEF COMPLAINT QUOTE
Pt transferred from [facility] for progressive weakness and paresthesias in BLE and back. Dr Marks neurosurgery to see pt in .

## 2022-08-02 NOTE — ED PROVIDER NOTE - CLINICAL SUMMARY MEDICAL DECISION MAKING FREE TEXT BOX
46 yo m with t2 myelitis on MRI done a [facility] t/f here for preexisting relationship with dr forrest.  D/w jennifer avila who will be down to see pt.

## 2022-08-02 NOTE — ED ADULT NURSE NOTE - NSIMPLEMENTINTERV_GEN_ALL_ED
Implemented All Fall with Harm Risk Interventions:  [place] to call system. Call bell, personal items and telephone within reach. Instruct patient to call for assistance. Room bathroom lighting operational. Non-slip footwear when patient is off stretcher. Physically safe environment: no spills, clutter or unnecessary equipment. Stretcher in lowest position, wheels locked, appropriate side rails in place. Provide visual cue, wrist band, yellow gown, etc. Monitor gait and stability. Monitor for mental status changes and reorient to person, place, and time. Review medications for side effects contributing to fall risk. Reinforce activity limits and safety measures with patient and family. Provide visual clues: red socks.

## 2022-08-02 NOTE — CHART NOTE - NSCHARTNOTEFT_GEN_A_CORE
Case d/w ED and neurology. Pt cannot fit in our MRI machine. Neurology recc MRI be done emergently. [facility] currently on diversion and patient refusing to be transferred to [facility]. He understands that this is delaying his care and may pose further risk to his health. Discussion was witnessed by ED provider.   PT will NOT be admitted at this time. ED provider will have patient transported to MRI at [facility] and patient can then be rendered the proper services here at  and admitted thereafter.  ED provider made aware.  Case management also providing assistance to for full [place] transportation via EMS.

## 2022-08-02 NOTE — ED PROVIDER NOTE - NS ED ROS FT
Constitutional: No fever or chills  Eyes: No visual changes  HEENT: No throat pain  CV: No chest pain  Resp: No SOB no cough  GI: No abd pain, nausea or vomiting  : No dysuria  MSK: No musculoskeletal pain, weakness lower ext  Skin: No rash  Neuro: No headache

## 2022-08-02 NOTE — H&P ADULT - HISTORY OF PRESENT ILLNESS
47 year old obese man with a PMHx of colorectal cancer s/p resection and chemotherapy 4 years ago, peripheral neuropathy, chronic left foot drop for more than 1yr, Htn,  kidney cyst presented to  ED  for evaluation of B/L leg paresthesias and weakness. Pt initially presented to  ED yesterday, neurosurgery had seen pt and recommended MRI T-spine, but pt was unable to fit into MRI machine so was transferred to [facility]. At [place] after his MRI was completed, pt requested to be transferred back to .    Upon questioning, pt explains that sx began about one yr ago in his left leg, his lower left leg and foot became swollen, with numbness and tingling. He notes that at that time, he was in the woods a lot, and just recently he had blood work done which came back positive for Lyme or Babesiosis.  For the past few weeks  he has noted swelling and progressive numbness in his Left leg as well and also he reports some saddle numbness for the past few days.  Pt admits to one episode of bowel incontinence this past Saturday. Left leg paresthesias still feel worse than sx in right leg, but he's never had sx in right leg previously. Paresthesias in both legs do not travel up past the knees. Admits difficulty ambulating d/t weakness in both legs, needs to use a cane. He states he's had worsening vision over the past few weeks.   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  Rectal cancer, completed chemotherapy  Status post proctocolectomy  with temporary Ileostomy- reversed 2019  Peripheral neuropathy  port cath insertion 08/2018      FAMILY HISTORY:  Father: Htn, Valve replacements, renal transplant     Social History:   Nonsmoker, no drug or alcohol use            REVIEW OF SYSTEMS:    CONSTITUTIONAL: No weakness, No fevers or chills  ENT: No ear ache, No sorethroat  NECK: No pain, No stiffness  RESPIRATORY: No cough, No wheezing, No hemoptysis; No dyspnea  CARDIOVASCULAR: No chest pain, No palpitations  GASTROINTESTINAL: No abd pain, No nausea, No vomiting, No hematemesis, No diarrhea or constipation. No melena, No hematochezia.  GENITOURINARY: No dysuria, No  hematuria  NEUROLOGICAL: No diplopia, bilat leg/foot paresthesia, +L foot drop  MUSCULOSKELETAL: No arthralgia, No myalgia  SKIN: No rashes, or lesions   PSYCH: no anxiety, no suicidal ideation    All other review of systems is negative unless indicated above    Vital Signs Last 24 Hrs  T(C): 36.4 (02 Aug 2022 14:52), Max: 37.1 (02 Aug 2022 10:24)  T(F): 97.5 (02 Aug 2022 14:52), Max: 98.7 (02 Aug 2022 10:24)  HR: 76 (02 Aug 2022 14:52) (71 - 91)  BP: 134/87 (02 Aug 2022 14:52) (132/92 - 158/90)  BP(mean): --  RR: 18 (02 Aug 2022 14:52) (18 - 20)  SpO2: 98% (02 Aug 2022 14:52) (96% - 100%)    Parameters below as of 02 Aug 2022 14:52  Patient On (Oxygen Delivery Method): room air        PHYSICAL EXAM:    GENERAL: NAD  HEENT:  NC/AT, EOMI, PERRLA, No scleral icterus, Moist mucous membranes  NECK: Supple, No JVD  CNS:  Alert & Oriented X3, Motor Strength 5/5 B/L upper and lower extremities; L foot drop, Rt foot no motor deficits , reports bilateral paresthesias in the legs below the knee   LUNG: Normal Breath sounds, Clear to auscultation bilaterally, No rales, No rhonchi, No wheezing  HEART: RRR; No murmurs, No rubs  ABDOMEN: +BS, ST/ND/NT  GENITOURINARY: Voiding, Bladder not distended  EXTREMITIES:  2+ Peripheral Pulses, No clubbing, No cyanosis, No tibial edema  MUSCULOSKELTAL: Joints normal ROM, No TTP, No effusion  SKIN: no rashes  RECTAL: deferred, not indicated  BREAST: deferred                          14.4   5.25  )-----------( 137      ( 02 Aug 2022 07:17 )             42.8     08-02    139  |  109<H>  |  14  ----------------------------<  119<H>  4.0   |  26  |  0.85    Ca    9.0      02 Aug 2022 07:17  Mg     2.0     08-01    TPro  7.5  /  Alb  3.8  /  TBili  1.1  /  DBili  x   /  AST  33  /  ALT  42  /  AlkPhos  67  08-02    Vancomycin levels:   Cultures:     MEDICATIONS  (STANDING):  lisinopril 20 milliGRAM(s) Oral daily  polyethylene glycol 3350 17 Gram(s) Oral two times a day    MEDICATIONS  (PRN):  acetaminophen     Tablet .. 650 milliGRAM(s) Oral every 6 hours PRN Temp greater or equal to 38C (100.4F), Mild Pain (1 - 3)  aluminum hydroxide/magnesium hydroxide/simethicone Suspension 30 milliLiter(s) Oral every 4 hours PRN Dyspepsia  melatonin 3 milliGRAM(s) Oral at bedtime PRN Insomnia  ondansetron Injectable 4 milliGRAM(s) IV Push every 8 hours PRN Nausea and/or Vomiting      all labs reviewed  all imaging reviewed      a/p:    1. RLE and saddle paresthesia:  r/o TM, r/o other demyelinating process  MRI lumbar and thoracic spine noted  LP ordered by neurology   MRI lumbar spine with contrast ordered by neurology but unable to perform at  due to patients size  If signs of inflammation on LP, will start Steroids    2. Htn:   c/w ACEinh    3. H/o chemotherapy induced peripheral neuropathy    4. DVT prophy: Lovenox

## 2022-08-02 NOTE — ED PROVIDER NOTE - OBJECTIVE STATEMENT
48 y/o male w/ a PMHx of peripheral neuropathy, rectal cancer, colon surgery, anemia, obesity, hearing loss, SHANNAN, sarcoma, herniated disc, and HTN presents to the ED from [facility] for evaluation. Pt reports having an MRI yesterday of lumbar spine and was transferred back to ED for myelitis. Pt is a pt of Dr. Marks. Pt returns to day for treatment of myelitis. Pt c/o trouble w/ left leg. Denies CP or SOB. No other complaints at this time.

## 2022-08-02 NOTE — ED ADULT TRIAGE NOTE - CHIEF COMPLAINT QUOTE
Pt transferred from [facility] for progressive weakness and paresthesias in BLE and back. Dr Marks neurosurgery to see pt in .

## 2022-08-02 NOTE — ED ADULT TRIAGE NOTE - DOMESTIC TRAVEL HIGH RISK QUESTION
Spoke with pt, informed her that we received a message from the triage nurse to contact her regarding appointment for high blood pressure. Pt scheduled appointment with Dr. Hall on 9/30/19 at 9 am.   No

## 2022-08-02 NOTE — ED PROVIDER NOTE - PROGRESS NOTE DETAILS
d/w neurosurgery who will be down to see pt. Discussed with neurology Dr. Perez who agrees with admission

## 2022-08-02 NOTE — ED ADULT NURSE NOTE - OBJECTIVE STATEMENT
Pt present to ED from [facility] for admission. Pt presents with weakness, and bilateral leg numbness. Myelitis seen on CT yest at [facility].

## 2022-08-02 NOTE — PATIENT PROFILE ADULT - FALL HARM RISK - HARM RISK INTERVENTIONS

## 2022-08-02 NOTE — ED ADULT NURSE REASSESSMENT NOTE - NS ED NURSE REASSESS COMMENT FT1
report given to CHRIS RN, all questions answered. Pt is stable with only complaints of LE numbness at this time.

## 2022-08-02 NOTE — CONSULT NOTE ADULT - NS ATTEND AMEND GEN_ALL_CORE FT
Pt seen and examined  Agree with above plan    I had a detailed d/w Pt and his parents who were present regarding his condition, further tests needed to arrive at a diagnosis.

## 2022-08-02 NOTE — ED ADULT TRIAGE NOTE - [facility]
Infectious Diseases   Consult Note      Reason for Consult:  Sepsis    Requesting Physician:  Dr. Leigh Davis       Date of Admission: 7/29/2020  Subjective:   CHIEF COMPLAINT:  None given       HPI:   Therese Kathleen is a 58yoF with history of HTN, obesity    Admitted 7/29/20 with sepsis, R flank pain. Obstructing R ureteral stone. UA suggesting cystitis. Worsening sepsis picture despite cefepime prompting ID consult    OR 7/30/20 for cysto, stent placement  BC and UC Are positive for Citrobacter  Today she is doing much better. MARISA and leukocytosis improving. She is afebrile     No prior history of UTI or nephrolithiasis      Current abx:  Meropenem 1g q12       Past Surgical History:       Diagnosis Date    Cancer ([facility] [address]     thyroid    Hypertension     Thyroid disease     S/P thyroidectomy due to cancer         Procedure Laterality Date    CYSTOSCOPY Right 7/30/2020    CYSTOSCOPY, RIGHT STENT PLACEMENT performed by Sagar Schwartz MD at [place]  10/8/2013    hysteroscopy  endometrial  ablation    KNEE ARTHROSCOPY Left     THYROIDECTOMY, COMPLETION  2000    TONSILLECTOMY         Social History:    TOBACCO:   reports that she quit smoking about 24 years ago. She has never used smokeless tobacco.  ETOH:   reports no history of alcohol use. There is no history of illicit drug use or other significant epidemiologic exposures.       Family History:       Problem Relation Age of Onset    Heart Disease Father 48        MI       Current Medications:    Current Facility-Administered Medications: [START ON 8/1/2020] enoxaparin (LOVENOX) injection 30 mg, 30 mg, Subcutaneous, Daily  calcitRIOL (ROCALTROL) capsule 0.5 mcg, 0.5 mcg, Oral, Daily  meropenem (MERREM) 1 g in sodium chloride 0.9 % 50 mL IVPB (mini-bag), 1 g, Intravenous, Q12H  levothyroxine (SYNTHROID) tablet 150 mcg, 150 mcg, Oral, Daily  sodium chloride flush 0.9 % injection 10 mL, 10 mL, Intravenous, 2 times per day  sodium chloride flush 0.9 % injection 10 mL, 10 mL, Intravenous, PRN  acetaminophen (TYLENOL) tablet 650 mg, 650 mg, Oral, Q6H PRN **OR** acetaminophen (TYLENOL) suppository 650 mg, 650 mg, Rectal, Q6H PRN  polyethylene glycol (GLYCOLAX) packet 17 g, 17 g, Oral, Daily PRN  promethazine (PHENERGAN) tablet 12.5 mg, 12.5 mg, Oral, Q6H PRN **OR** ondansetron (ZOFRAN) injection 4 mg, 4 mg, Intravenous, Q6H PRN  0.9 % sodium chloride infusion, , Intravenous, Continuous  ketorolac (TORADOL) injection 30 mg, 30 mg, Intravenous, Q6H PRN      Allergies   Allergen Reactions    Penicillins         REVIEW OF SYSTEMS:    CONSTITUTIONAL:  Per HPI  EYES:  negative for blurred vision, eye discharge, visual disturbance and icterus  HEENT:  negative for hearing loss, tinnitus, ear drainage, sinus pressure, nasal congestion, epistaxis and snoring  RESPIRATORY:  No cough, shortness of breath, hemoptysis  CARDIOVASCULAR:  negative for chest pain, palpitations, exertional chest pressure/discomfort, edema, syncope  GASTROINTESTINAL:  negative for nausea, vomiting, diarrhea, constipation, blood in stool and abdominal pain  GENITOURINARY:   Per HPI   HEMATOLOGIC/LYMPHATIC:  negative for easy bruising, bleeding and lymphadenopathy  ALLERGIC/IMMUNOLOGIC:  negative for recurrent infections, angioedema, anaphylaxis and drug reactions  ENDOCRINE:  negative for weight changes and diabetic symptoms including polyuria, polydipsia and polyphagia  MUSCULOSKELETAL:  negative for acute joint pain, joint swelling, decreased range of motion and muscle weakness  NEUROLOGICAL:  negative for headaches, slurred speech, unilateral weakness  PSYCHIATRIC/BEHAVIORAL: negative for hallucinations, behavioral problems, confusion and agitation.        Objective:   PHYSICAL EXAM:      VITALS:  /78   Pulse 72   Temp 98.6 °F (37 °C) (Axillary)   Resp 16   Ht 5' 6\" (1.676 m)   Wt (!) 347 lb 4 oz (157.5 kg)   LMP 11/24/2016   SpO2 93%   BMI 56.05 kg/m²  used

## 2022-08-02 NOTE — CONSULT NOTE ADULT - SUBJECTIVE AND OBJECTIVE BOX
Patient is a 47y old Male who presents with a chief complaint of B/L leg paresthesias and weakness    HPI: 47 year old obese man with a PMHx of colon cancer s/p resection and chemotherapy 4 years ago, HTN, peripheral neuropathy, chronic left foot drop, and kidney cyst presented to  ED on 8/2 for evaluation of B/L leg paresthesias and weakness. Pt initially presented He presents to the ED c/o worsening numbness in his RLE, urinary retention and one episode of bowl incontinence on Saturday night. He states he now has worsening numbness in his right leg up to his ankle and he feels the weakness is also worse. He states he has numbness in his buttock but denies saddle anesthesia. On exam he describes his left foot as having "pins and needles" up to the mid calf and true numbness in his right foot up to the ankle. His left foot is 4/5 with plantar flexion and 1/5 and his RLE is  with dorsi flexion which is consistent with Dr. Marks's exam back in June. Pt was scheduled to have an out patient MRI. He was sent to the MR scanner here at [facility] but his shoulder width did not fit in the machine. He denies any pain. He states he also has a cyst on his kidney which is scheduled for surgical removal.        With PMHx of...Presented to ED on (day) and (time) with (symptoms). Last known normal was at (time).   Then story of what happened (not current sx).     In ED,  (name of physician) evaluated the patient, and code stroke was called (document NIHSS if code stroke was called on weekend).  mg was given in ED. (Was BP elevated?) CTH/CTA head and neck and CTP were unremarkable or CTH showed no acute infarct or hemorrhage. CTA head and neck showed no LVO, stenosis or aneurysms. CT perfusion showed no core infarct. (document no hemorrhage if TPA was given). TPA was given at (time), or TPA was not given because all symptoms had resolved/symptoms were minor, NIHSS 0.      Upon evaluation, patient is awake and alert..  Denies any numbness, headache, visual changes, changes in speech, dizziness or vertigo.    PAST MEDICAL & SURGICAL HISTORY:  Essential hypertension      Seasonal allergies      Mild intermittent asthma without complication      Iron deficiency anemia due to chronic blood loss      Lumbar herniated disc      Obstructive sleep apnea syndrome  non compliant with CPAP      Sarcoma      Foot drop, left      Morbid obesity      Port-A-Cath in place  right chest wall inserted 2018      Hearing loss  b/l      Anemia  history of anemia      Rectal cancer  completed chemotherapy      COVID-19 vaccine series completed  [employer]- March      Peripheral neuropathy  chemotherpay induced peripheral neuropathy      History of colon surgery  for rectal cancer- ileostomy creatio and reversal in 2019      H/O myringotomy  bilateral. 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      Vital Signs Last 24 Hrs  T(C): 36.6 (02 Aug 2022 06:08), Max: 36.9 (02 Aug 2022 03:57)  T(F): 97.8 (02 Aug 2022 06:08), Max: 98.5 (02 Aug 2022 03:57)  HR: 77 (02 Aug 2022 06:08) (67 - 91)  BP: 153/93 (02 Aug 2022 06:08) (141/107 - 158/90)  BP(mean): 100 (01 Aug 2022 11:00) (100 - 100)  RR: 18 (02 Aug 2022 06:08) (18 - 20)  SpO2: 96% (02 Aug 2022 06:08) (96% - 100%)    Parameters below as of 02 Aug 2022 06:08  Patient On (Oxygen Delivery Method): room air        Physical Exam:    General: Normocephalic, NAD/Obtunded-sedated      Constitutional: awake and alert.  HEENT: PERRLA, EOMI,   Neck: Supple.  Respiratory: Breath sounds are clear bilaterally  Cardiovascular: S1 and S2, regular / irregular rhythm  Gastrointestinal: soft, nontender  Extremities:  no edema  Vascular: Caritid Bruit - no  Musculoskeletal: no joint swelling/tenderness, no abnormal movements  Skin: No rashes    Neurological exam:  HF: A x O x 3. Appropriately interactive, normal affect. Speech fluent, No Aphasia or paraphasic errors. Naming /repetition intact   CN: FRANCISCO, EOMI, VFF, facial sensation normal, no NLFD, tongue midline, Palate moves equally, SCM equal bilaterally  Motor: No pronator drift, Strength 5/5 in all 4 ext, normal bulk and tone, no tremor, rigidity or bradykinesia.    Sens: Intact to light touch / PP/ VS/ JS    Reflexes: Symmetric and normal . BJ 2+, BR 2+, KJ 2+, AJ 2+, downgoing toes b/l  Coord:  No FNFA, dysmetria, NAUN intact   Gait/Balance: Normal/Cannot test    NIHSS:          NIHSS:    Labs:   08-02    139  |  109<H>  |  14  ----------------------------<  119<H>  4.0   |  26  |  0.85    Ca    9.0      02 Aug 2022 07:17  Mg     2.0     08-01    TPro  7.5  /  Alb  3.8  /  TBili  1.1  /  DBili  x   /  AST  33  /  ALT  42  /  AlkPhos  67  08-02                              14.4   5.25  )-----------( 137      ( 02 Aug 2022 07:17 )             42.8       Radiology:  - CT Head:  - MRI brain  -MRA brain/Carotids  - EEG  -Echo (just write one or two important things from impression)    A/P: Copy and paste this section to assessment and plan section    For an acute stroke found incidentally, need to do full stroke workup, treat like acute stroke (carotid dopplers are ok instead of MRA head and neck in old age group)  Write most important diagnoses first  Do not need to repeat exam findings  Don't write consider (unless it's cardiac)- write recommend  Don't write why you're doing something (no thought processs) unless it's a differential. Make it simple  Don't say 'appreciate recs' we're only consulting    Take out telemetry if no longer ruling out CVA  Add vestibular PT if vertigo    No IV tpa given because…    #R/o acute CVA    #TIA, resolved within..    #Uncontrolled HTN    #Carotid artery disease/intracranial atherosclerosis    #Peripheral vertigo etiology    #S/p vasovagal episode- likely pre-convulsive syncope, secondary to hypoperfusion    #S/p syncopal episode- most likely vasovagal vs hypoperfusion vs orthostatic hypotension    #Hypoperfusion secondary to ICA/intracranial stenosis vs cardiac arrythmia    #Possible TIA vs CVA    #Period of unresponsiveness, secondary to hypoglycemia vs possible seizure episode    #Seizure disorder    #R/o vertebrobasilar insufficiency/ acute cerebellar CVA    #Orthostatic hypotension- d/t drop in SBP > 20 with positional changes    #Acute metabolic/ toxic encephalopathy    #Acute encephalopathy superimposed on chronic debilitating state, r/o seziures    # Acute on insidious encephalopathy, likely secondary to...( recent COVID-19 + LLL PNA)    &Acute encephalopathy superimposed on likely chronic neurologic impairment, likely secondary to sepsis    # Right facial and right side numbness; rule out lacunar CVA, not a tpa candidate as OOW and NIHSS - low    # History of migraines with aura; rule out possibility of complex migraine     # Paresthesias upper extremities; rule out cervical radiculopathy/ sensory neuropathy related to DM/Lupus or entrapment neuropathies - common in DM    -ASA/PLAVIX or No ASA plavix for 24 hrs (if TPA was given)  -Continue Atorvastatin  -DVT prophylaxis  -Dysphagia screen  -Echo  -Speech and swallow eval  -PT eval/ rehab eval  -Telemonitoring    - MRI head ordered  - ASA 81 mg QD, starting 24 hrs after  was given, or ASA suppository if patient failed dysphagia screen  - Atorvastatin, lipid profile, Hgb A1c within 24 hours  - Monitor HTN- maintain systolic bp 170-190, no less than 120 (no monitor BP)  - Neuro checks Q4h  - Vital signs Q4h  - Blood glucose checks Q6h for 1st 24hrs  - Echo  - PT eval  - Dysphagia screen today  - Speech and swallow eval  - DVT prophylaxis  - Telemonitoring    Patient was discussed with              Patient is a 47y old Male who presents with a chief complaint of B/L leg paresthesias and weakness    HPI: 47 year old obese man with a PMHx of colorectal cancer s/p resection and chemotherapy 4 years ago, HTN, peripheral neuropathy, chronic left foot drop, and kidney cyst presented to  ED on 8/2 for evaluation of B/L leg paresthesias and weakness. Pt initially presented to  ED yesterday, neurosurgery had seen pt and recommended MRI [facility], but pt was unable to fit into MRI machine so was transferred to [facility]. At [place] after his MRI was completed, pt requested to be transferred back to .    Upon questioning, pt explains that sx began about one yr ago in his left leg, his lower left leg and foot became swollen, with numbness and tingling. He notes that at that time, he was in the woods a lot, and just recently he had bloodwork done which came back positive for babesiosis. Pt states he has seen many doctors, including orthopedics, who wanted to do lumbar fusion surgery, and neurosurgery, who recommended he see neurology. He saw a neurologist from [place] who then recommended he go back to see neurosurgery. Recently, Dr. Marks recommended he have an EMG done as outpatient, which has not been done yet. He also has a chronic left foot drop, which he was told by spine in the past is from his lower back.    Currently pt reports that he now has been experiencing RLE swelling and paresthesias below the knee x 3 weeks, with associated saddle anesthesia x 4 days. Pt admits to one episode of bowel incontinence this past Saturday. Left leg paresthesias still feel worse than sx in right leg, but he's never had sx in right leg previously. Paresthesias in both legs do not travel up past the knees. Admits difficulty ambulating d/t weakness in both legs, needs to use a cane. He states he's had worsening vision over the past few weeks. He otherwise denies HA, speech disturbances, dizziness, lightheadedness, back/neck pain.      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      Vital Signs Last 24 Hrs  T(C): 36.6 (02 Aug 2022 06:08), Max: 36.9 (02 Aug 2022 03:57)  T(F): 97.8 (02 Aug 2022 06:08), Max: 98.5 (02 Aug 2022 03:57)  HR: 77 (02 Aug 2022 06:08) (67 - 91)  BP: 153/93 (02 Aug 2022 06:08) (141/107 - 158/90)  BP(mean): 100 (01 Aug 2022 11:00) (100 - 100)  RR: 18 (02 Aug 2022 06:08) (18 - 20)  SpO2: 96% (02 Aug 2022 06:08) (96% - 100%)    Parameters below as of 02 Aug 2022 06:08  Patient On (Oxygen Delivery Method): room air    Physical Exam:    General: Normocephalic, NAD  HEENT: PERRLA, EOMI  Neck: Supple.  Respiratory: Breath sounds are clear bilaterally  Cardiovascular: S1 and S2  Extremities:  no edema  Vascular: Carotid Bruit - no  Musculoskeletal: no joint swelling/tenderness, no abnormal movements  Skin: No rashes    Neurological exam:  HF: A x O x 3. Appropriately interactive, normal affect. Speech fluent, No Aphasia or paraphasic errors. Naming /repetition intact   CN: FRANCISCO, EOMI, VFF, facial sensation normal, no NLFD, tongue midline, Palate moves equally, SCM equal bilaterally  Motor: No pronator drift, Strength 5/5 in RUE and RLE, strength 0/5 L foot DF, 5/5 HF, 5/5 KE, 5/5 GS, normal bulk and tone, no tremor  Sens: Increased paresthesias + allodynia L lower leg and foot > R lower leg. Sharp and dull sensations are intact B/L  Reflexes: Symmetric and hyporeflexive LE, downgoing toes b/l, negative clonus  Coord:  No FNFA  Gait/Balance: Cannot test    Labs:   08-02    139  |  109<H>  |  14  ----------------------------<  119<H>  4.0   |  26  |  0.85    Ca    9.0      02 Aug 2022 07:17  Mg     2.0     08-01    TPro  7.5  /  Alb  3.8  /  TBili  1.1  /  DBili  x   /  AST  33  /  ALT  42  /  AlkPhos  67  08-02               14.4   5.25  )-----------( 137      ( 02 Aug 2022 07:17 )             42.8       Radiology:  MR Lumbar Spine No Cont (08.01.22 @ 20:55) >  1. Poorly marginated STIR and T2 hyperintensity involving the distal cord   and  conus could reflect cord edema versus myelomalacia/gliosis and   correlation with  clinical data is requested.  2. Broad-based posterior disc bulges are seen atL3-L4 and L4-L5 with the  central canal adequate at these and all remaining lumbar levels.  Right  foraminal disc protrusion at L4-L5 results in right foraminal narrowing   with  neural foramina adequate at remaining lumbar levels.    Final report: Correlation with thoracic spine obtained concurrently   demonstrates increased signal in the cord the conus which is nonspecific   but may represent infection inflammation or demyelination. Recommend   repeat thoracic spine with contrast.    MR Thoracic Spine No Cont (08.01.22 @ 20:39) >  1. Poorly marginated STIR and T2 hyperintensity is seen involving the   distal  cord and conus with a suggestion of possible cord expansion and this could  reflect cord edema versus myelomalacia/gliosis.  No intramedullary signal  abnormalities are detected at remaining thoracic levels and there is no  evidence of thoracic cord compression seen at any level.  2. Posterior central disc protrusions/disc bulging seen between T7-8 and   T10-11  with no canal stenosis or cord compression detected at these or remaining  thoracic levels.    Final report:  Agree with preliminary report.  Increased signal intensity in the conus with cord expansion, differential   diagnosis includes infectious inflammatory demyelinating, recommend   correlation with postcontrast scan.      A/P: 47 year old obese man with a PMHx of colorectal cancer s/p resection and chemotherapy 4 years ago, HTN, peripheral neuropathy, chronic left foot drop, and kidney cyst presented to  ED on 8/2 for evaluation of B/L leg paresthesias and weakness, initially began as L leg paresthesias about one yr ago, has now progressed to RLE paresthesias x 3 weeks, with associated saddle anesthesia x 4 days.     # Paresthesias B/L LE L> R; R/o demyelinating/ inflammatory disease, ? transverse myelitis    -MRI Brain  -Spinal tap may need to be performed; however, procedure should be done under IR d/t pt's body habitus  -ESR, CRP,  B12, folate, check all tick titers, rheumatology panel  -R/o any underlying inflammatory conditions  -PT eval  -DVT prophylaxis    Patient was discussed with Dr. Wiseman             Patient is a 47y old Male who presents with a chief complaint of B/L leg paresthesias and weakness    HPI: 47 year old obese man with a PMHx of colorectal cancer s/p resection and chemotherapy 4 years ago, HTN, peripheral neuropathy, chronic left foot drop, and kidney cyst presented to  ED on 8/2 for evaluation of B/L leg paresthesias and weakness. Pt initially presented to  ED yesterday, neurosurgery had seen pt and recommended MRI [facility], but pt was unable to fit into MRI machine so was transferred to [facility]. At [place] after his MRI was completed, pt requested to be transferred back to .    Upon questioning, pt explains that sx began about one yr ago in his left leg, his lower left leg and foot became swollen, with numbness and tingling. He notes that at that time, he was in the woods a lot, and just recently he had bloodwork done which came back positive for babesiosis. Pt states he has seen many doctors, including orthopedics, who wanted to do lumbar fusion surgery, and neurosurgery, who recommended he see neurology. He saw a neurologist from [place] who then recommended he go back to see neurosurgery. Recently, Dr. Marks recommended he have an EMG done as outpatient, which has not been done yet. He also has a chronic left foot drop, which he was told by spine in the past is from his lower back.    Currently pt reports that he now has been experiencing RLE swelling and paresthesias below the knee x 3 weeks, with associated saddle anesthesia x 4 days. Pt admits to one episode of bowel incontinence this past Saturday. Left leg paresthesias still feel worse than sx in right leg, but he's never had sx in right leg previously. Paresthesias in both legs do not travel up past the knees. Admits difficulty ambulating d/t weakness in both legs, needs to use a cane. He states he's had worsening vision over the past few weeks. He otherwise denies HA, speech disturbances, dizziness, lightheadedness, back/neck pain.      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      Vital Signs Last 24 Hrs  T(C): 36.6 (02 Aug 2022 06:08), Max: 36.9 (02 Aug 2022 03:57)  T(F): 97.8 (02 Aug 2022 06:08), Max: 98.5 (02 Aug 2022 03:57)  HR: 77 (02 Aug 2022 06:08) (67 - 91)  BP: 153/93 (02 Aug 2022 06:08) (141/107 - 158/90)  BP(mean): 100 (01 Aug 2022 11:00) (100 - 100)  RR: 18 (02 Aug 2022 06:08) (18 - 20)  SpO2: 96% (02 Aug 2022 06:08) (96% - 100%)    Parameters below as of 02 Aug 2022 06:08  Patient On (Oxygen Delivery Method): room air    Physical Exam:    General: Normocephalic, NAD  HEENT: PERRLA, EOMI  Neck: Supple.  Respiratory: Breath sounds are clear bilaterally  Cardiovascular: S1 and S2  Extremities:  no edema  Vascular: Carotid Bruit - no  Musculoskeletal: no joint swelling/tenderness, no abnormal movements  Skin: No rashes    Neurological exam:  HF: A x O x 3. Appropriately interactive, normal affect. Speech fluent, No Aphasia or paraphasic errors. Naming /repetition intact   CN: FRANCISCO, EOMI, VFF, facial sensation normal, no NLFD, tongue midline, Palate moves equally, SCM equal bilaterally  Motor: No pronator drift, Strength 5/5 in RUE and RLE, strength 0/5 L foot DF, 5/5 HF, 5/5 KE, 5/5 GS, normal bulk and tone, no tremor  Sens: Increased paresthesias + allodynia L lower leg and foot > R lower leg. Sharp and dull sensations are intact B/L  Reflexes: Symmetric and hyporeflexive LE, downgoing toes b/l, negative clonus  Coord:  No FNFA  Gait/Balance: Cannot test    Labs:   08-02    139  |  109<H>  |  14  ----------------------------<  119<H>  4.0   |  26  |  0.85    Ca    9.0      02 Aug 2022 07:17  Mg     2.0     08-01    TPro  7.5  /  Alb  3.8  /  TBili  1.1  /  DBili  x   /  AST  33  /  ALT  42  /  AlkPhos  67  08-02               14.4   5.25  )-----------( 137      ( 02 Aug 2022 07:17 )             42.8       Radiology:  MR Lumbar Spine No Cont (08.01.22 @ 20:55) >  1. Poorly marginated STIR and T2 hyperintensity involving the distal cord   and  conus could reflect cord edema versus myelomalacia/gliosis and   correlation with  clinical data is requested.  2. Broad-based posterior disc bulges are seen atL3-L4 and L4-L5 with the  central canal adequate at these and all remaining lumbar levels.  Right  foraminal disc protrusion at L4-L5 results in right foraminal narrowing   with  neural foramina adequate at remaining lumbar levels.    Final report: Correlation with thoracic spine obtained concurrently   demonstrates increased signal in the cord the conus which is nonspecific   but may represent infection inflammation or demyelination. Recommend   repeat thoracic spine with contrast.    MR Thoracic Spine No Cont (08.01.22 @ 20:39) >  1. Poorly marginated STIR and T2 hyperintensity is seen involving the   distal  cord and conus with a suggestion of possible cord expansion and this could  reflect cord edema versus myelomalacia/gliosis.  No intramedullary signal  abnormalities are detected at remaining thoracic levels and there is no  evidence of thoracic cord compression seen at any level.  2. Posterior central disc protrusions/disc bulging seen between T7-8 and   T10-11  with no canal stenosis or cord compression detected at these or remaining  thoracic levels.    Final report:  Agree with preliminary report.  Increased signal intensity in the conus with cord expansion, differential   diagnosis includes infectious inflammatory demyelinating, recommend   correlation with postcontrast scan.      A/P: 47 year old obese man with a PMHx of colorectal cancer s/p resection and chemotherapy 4 years ago, HTN, peripheral neuropathy, chronic left foot drop, and kidney cyst presented to  ED on 8/2 for evaluation of B/L leg paresthesias and weakness, initially began as L leg paresthesias about one yr ago, has now progressed to RLE paresthesias x 3 weeks, with associated saddle anesthesia x 4 days.     # Paresthesias B/L LE L> R; R/o demyelinating/ inflammatory disease, ? transverse myelitis    -MRI Brain and MRI Cspine  -Spinal tap may need to be performed to determine etiology- infectious vs autoimmune; however, procedure should be done under IR d/t pt's body habitus  -ESR, CRP,  B12, folate, check all tick titers, rheumatology panel  -May need EMG/NCV studies if suspicion for demyelinating disease, not available in   -R/o any underlying inflammatory or infectious conditions  -PT eval  -DVT prophylaxis    Patient was discussed with Dr. Wiseman             CC: 47 y old Male who presents with a chief c/o of B/L leg paresthesias and weakness    HPI: 47 year old M with a PMHx of colorectal cancer s/p resection and chemotherapy 4 years ago, HTN, PN, Sarcoma resection / chronic left foot drop, presented to  ED on 8/2 for evaluation of B/L leg paresthesias and weakness. Pt initially presented to  ED yesterday, neurosurgery was consulted, recommended MRI T-spine, but pt was unable to fit into MRI machine, was transferred to [facility]. At [place] after MRI was completed, pt requested to be transferred back to .    Upon questioning, pt explains that his sx began about a yr ago in his left leg, left leg and foot became swollen then numbness and tingling. He notes that at that time, he was in the woods a lot, recently he had blood work which came back positive for babesiosis. Pt has seen several physicians including orthopedics, who wanted to do lumbar fusion surgery, neurosurgery, recommended he see neurology, he saw a neurologist from [facility] who sent him back to see neurosurgery. Recently, Dr. Marks recommended EMG/NCV studies  as OP, has not been done yet. He also has a chronic left foot drop, which he was told by spine in the past is from his lower back.    Currently pt reports RLE swelling and paresthesias below the knee x 3 weeks and saddle anesthesia x 4 days. Pt admits to one episode of bowel incontinence this past Saturday. Left leg paresthesias still feel worse than sx in right leg, but he's never had sx in right leg previously. Paresthesias in both legs do not travel up past the knees. Admits difficulty ambulating d/t weakness in both legs, needs to use a cane. He states he's had worsening vision over the past few weeks. He otherwise denies HA, speech disturbances, dizziness, lightheadedness, back/neck pain.      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      Vital Signs Last 24 Hrs  T(C): 36.6 (02 Aug 2022 06:08), Max: 36.9 (02 Aug 2022 03:57)  T(F): 97.8 (02 Aug 2022 06:08), Max: 98.5 (02 Aug 2022 03:57)  HR: 77 (02 Aug 2022 06:08) (67 - 91)  BP: 153/93 (02 Aug 2022 06:08) (141/107 - 158/90)  BP(mean): 100 (01 Aug 2022 11:00) (100 - 100)  RR: 18 (02 Aug 2022 06:08) (18 - 20)  SpO2: 96% (02 Aug 2022 06:08) (96% - 100%)      Physical Exam:  Normocephalic, NAD  HEENT: PERRLA, EOMI  Neck: Supple.  Respiratory: Breath sounds are clear bilaterally  Cardiovascular: S1 and S2  Extremities:  no edema  Vascular: Carotid Bruit - no  Musculoskeletal: no joint swelling/tenderness, no abnormal movements  Skin: No rashes      Neurological exam:  HF: A x O x 3. Appropriately interactive, normal affect. Speech fluent, No Aphasia or paraphasic errors. Naming /repetition intact   CN: FRANCISCO, EOMI, VFF, facial sensation normal, no NLFD, tongue midline, Palate moves equally, SCM equal bilaterally  Motor: No pronator drift, Strength 5/5 in RUE and RLE, LUE 5/5, L HF 5-/5, KE 5/5, DF 0/5, bulk and tone nl, no tremor  Sens: loss of PP left L4/5-L5/S1 dermatomes  Reflexes: UE's 1+, LE absent, downgoing toes b/l, negative clonus  Coord:  No FNFA  Gait/Balance: Slapping gait    Labs:   08-02    139  |  109<H>  |  14  ----------------------------<  119<H>  4.0   |  26  |  0.85    Ca    9.0      02 Aug 2022 07:17  Mg     2.0     08-01    TPro  7.5  /  Alb  3.8  /  TBili  1.1  /  DBili  x   /  AST  33  /  ALT  42  /  AlkPhos  67  08-02               14.4   5.25  )-----------( 137      ( 02 Aug 2022 07:17 )             42.8       Radiology:  MR Lumbar Spine No Cont (08.01.22 @ 20:55) >  1. Poorly marginated STIR and T2 hyperintensity involving the distal cord and  conus could reflect cord edema versus myelomalacia/gliosis and   correlation with clinical data is requested.  2. Broad-based posterior disc bulges are seen atL3-L4 and L4-L5 with the  central canal adequate at these and all remaining lumbar levels.  Right  foraminal disc protrusion at L4-L5 results in right foraminal narrowing   with  neural foramina adequate at remaining lumbar levels.    Final report: Correlation with thoracic spine obtained concurrently   demonstrates increased signal in the cord the conus which is nonspecific   but may represent infection inflammation or demyelination. Recommend   repeat thoracic spine with contrast.    MR Thoracic Spine No Cont (08.01.22 @ 20:39) >  1. Poorly marginated STIR and T2 hyperintensity is seen involving the distal  cord and conus with a suggestion of possible cord expansion and this could  reflect cord edema versus myelomalacia/gliosis.  No intramedullary signal  abnormalities are detected at remaining thoracic levels and there is no  evidence of thoracic cord compression seen at any level.  2. Posterior central disc protrusions/disc bulging seen between T7-8 and  T10-11  with no canal stenosis or cord compression detected at these or remaining  thoracic levels.    Final report:  Agree with preliminary report.  Increased signal intensity in the conus with cord expansion, differential   diagnosis includes infectious inflammatory demyelinating, recommend   correlation with postcontrast scan.

## 2022-08-03 ENCOUNTER — NON-APPOINTMENT (OUTPATIENT)
Age: 47
End: 2022-08-03

## 2022-08-03 LAB
ADD ON TEST-SPECIMEN IN LAB: SIGNIFICANT CHANGE UP
B BURGDOR IGG+IGM SER QL IB: SIGNIFICANT CHANGE UP
ERYTHROCYTE [SEDIMENTATION RATE] IN BLOOD: 12 MM/HR — SIGNIFICANT CHANGE UP (ref 0–15)
T PALLIDUM AB TITR SER: NEGATIVE — SIGNIFICANT CHANGE UP

## 2022-08-03 PROCEDURE — 99233 SBSQ HOSP IP/OBS HIGH 50: CPT

## 2022-08-03 RX ORDER — PANTOPRAZOLE SODIUM 20 MG/1
40 TABLET, DELAYED RELEASE ORAL
Refills: 0 | Status: DISCONTINUED | OUTPATIENT
Start: 2022-08-03 | End: 2022-08-08

## 2022-08-03 RX ORDER — PSYLLIUM SEED (WITH DEXTROSE)
1 POWDER (GRAM) ORAL EVERY 12 HOURS
Refills: 0 | Status: DISCONTINUED | OUTPATIENT
Start: 2022-08-03 | End: 2022-08-08

## 2022-08-03 RX ORDER — HYDROCHLOROTHIAZIDE 25 MG
25 TABLET ORAL DAILY
Refills: 0 | Status: DISCONTINUED | OUTPATIENT
Start: 2022-08-03 | End: 2022-08-08

## 2022-08-03 RX ADMIN — Medication 1 PACKET(S): at 10:17

## 2022-08-03 RX ADMIN — Medication 1 PACKET(S): at 21:32

## 2022-08-03 RX ADMIN — LISINOPRIL 20 MILLIGRAM(S): 2.5 TABLET ORAL at 10:17

## 2022-08-03 RX ADMIN — Medication 25 MILLIGRAM(S): at 15:48

## 2022-08-03 RX ADMIN — Medication 3 MILLIGRAM(S): at 21:32

## 2022-08-03 RX ADMIN — POLYETHYLENE GLYCOL 3350 17 GRAM(S): 17 POWDER, FOR SOLUTION ORAL at 10:17

## 2022-08-03 RX ADMIN — POLYETHYLENE GLYCOL 3350 17 GRAM(S): 17 POWDER, FOR SOLUTION ORAL at 21:32

## 2022-08-03 RX ADMIN — PANTOPRAZOLE SODIUM 40 MILLIGRAM(S): 20 TABLET, DELAYED RELEASE ORAL at 15:48

## 2022-08-03 RX ADMIN — Medication 86 MILLIGRAM(S): at 15:48

## 2022-08-03 NOTE — PROGRESS NOTE ADULT - SUBJECTIVE AND OBJECTIVE BOX
History of Present Illness:   47 year old obese man with a PMHx of colorectal cancer s/p resection and chemotherapy 4 years ago, peripheral neuropathy, LLE sarcoma s/p resection, chronic left foot drop and paresthesia  for more than 1yr, Htn,  kidney cyst presented to  ED  for evaluation of B/L leg paresthesias and weakness. Pt initially presented to  ED yesterday, neurosurgery had seen pt and recommended MRI T-spine, but pt was unable to fit into MRI machine so was transferred to [facility]. At [place] and after his MRI was completed, pt requested to be transferred back to .  Patient explains that sx began about one yr ago in his Left leg, with swelling ,  numbness and tingling. He notes that at that time, he was in the woods a lot, and just recently he had blood work done which came back positive for Lyme or Babesiosis?    For the past few weeks  he has noted swelling and progressive numbness in his Right leg as well and also he reports some saddle numbness for the past few days.  Pt admits to one episode of bowel incontinence this past Saturday. Left leg paresthesias still feel worse than the right. Paresthesias in both legs do not travel up past the knees. Admits difficulty ambulating d/t weakness in both legs, needs to use a cane. He states he's had worsening vision over the past few weeks.   He otherwise denies HA, speech disturbances, dizziness, lightheadedness, back/neck pain, no fever/chills, no rashes, no recent  insect bites       8.3: symptoms have not changed; still has paresthesia in Rt leg, no motor deficits in the Rt leg  Left foot drop chronic, +L foot paresthesia chronic     REVIEW OF SYSTEMS:    CONSTITUTIONAL: No weakness, No fevers or chills  ENT: No ear ache, No sorethroat  NECK: No pain, No stiffness  RESPIRATORY: No cough, No wheezing, No hemoptysis; No dyspnea  CARDIOVASCULAR: No chest pain, No palpitations  GASTROINTESTINAL: No abd pain, No nausea, No vomiting, No hematemesis, No diarrhea or constipation. No melena, No hematochezia.  GENITOURINARY: No dysuria, No  hematuria  NEUROLOGICAL: No diplopia, bilat leg/foot paresthesia, +L foot drop  MUSCULOSKELETAL: No arthralgia, No myalgia  SKIN: No rashes, or lesions   PSYCH: no anxiety, no suicidal ideation    All other review of systems is negative unless indicated above    Vital Signs Last 24 Hrs  T(C): 36.5 (03 Aug 2022 08:47), Max: 36.8 (02 Aug 2022 14:12)  T(F): 97.7 (03 Aug 2022 08:47), Max: 98.2 (02 Aug 2022 14:12)  HR: 73 (03 Aug 2022 08:47) (73 - 87)  BP: 143/94 (03 Aug 2022 08:47) (134/87 - 143/94)  BP(mean): 100 (02 Aug 2022 21:08) (100 - 100)  RR: 18 (03 Aug 2022 08:47) (18 - 18)  SpO2: 97% (03 Aug 2022 08:47) (97% - 99%)    Parameters below as of 03 Aug 2022 08:47  Patient On (Oxygen Delivery Method): room air            PHYSICAL EXAM:    GENERAL: NAD  HEENT:  NC/AT, EOMI, PERRLA, No scleral icterus, Moist mucous membranes  NECK: Supple, No JVD  CNS:  Alert & Oriented X3, Motor Strength 5/5 B/L upper and lower extremities; L foot drop, Rt foot no motor deficits , reports bilateral paresthesias in the legs below the knee   LUNG: Normal Breath sounds, Clear to auscultation bilaterally, No rales, No rhonchi, No wheezing  HEART: RRR; No murmurs, No rubs  ABDOMEN: +BS, ST/ND/NT  GENITOURINARY: Voiding, Bladder not distended  EXTREMITIES:  2+ Peripheral Pulses, No clubbing, No cyanosis, No tibial edema  MUSCULOSKELTAL: Joints normal ROM, No TTP, No effusion  SKIN: no rashes  RECTAL: deferred, not indicated  BREAST: deferred    a/p:    1. RLE and saddle paresthesia:  r/o TM, r/o other demyelinating process  MRI lumbar and thoracic spine noted  LP done: no evidence of infection, increased Proteins points toward an inflammatory process ; will start solumedrol IV 1gm daily x 3-5days  MRI lumbar spine with contrast ordered by neurology but unable to perform at  due to patients size      2. Htn:   c/w ACEinh/HCTZ    3. H/o chemotherapy induced peripheral neuropathy    4. DVT prophy: hold Heparinoids due to presence of RBC in spinal fluid History of Present Illness:   47 year old obese man with a PMHx of colorectal cancer s/p resection and chemotherapy 4 years ago, peripheral neuropathy, LLE sarcoma s/p resection, chronic left foot drop and paresthesia  for more than 1yr, Htn,  kidney cyst presented to  ED  for evaluation of B/L leg paresthesias and weakness. Pt initially presented to  ED yesterday, neurosurgery had seen pt and recommended MRI T-spine, but pt was unable to fit into MRI machine so was transferred to [facility]. At [place] and after his MRI was completed, pt requested to be transferred back to .  Patient explains that sx began about one yr ago in his Left leg, with swelling ,  numbness and tingling. He notes that at that time, he was in the woods a lot, and just recently he had blood work done which came back positive for Lyme or Babesiosis?    For the past few weeks  he has noted swelling and progressive numbness in his Right leg as well and also he reports some saddle numbness for the past few days.  Pt admits to one episode of bowel incontinence this past Saturday. Left leg paresthesias still feel worse than the right. Paresthesias in both legs do not travel up past the knees. Admits difficulty ambulating d/t weakness in both legs, needs to use a cane. He states he's had worsening vision over the past few weeks.   He otherwise denies HA, speech disturbances, dizziness, lightheadedness, back/neck pain, no fever/chills, no rashes, no recent  insect bites       8.3: symptoms have not changed; still has paresthesia in Rt leg, no motor deficits in the Rt leg  Left foot drop chronic, +L foot paresthesia chronic     REVIEW OF SYSTEMS:    CONSTITUTIONAL: No weakness, No fevers or chills  ENT: No ear ache, No sorethroat  NECK: No pain, No stiffness  RESPIRATORY: No cough, No wheezing, No hemoptysis; No dyspnea  CARDIOVASCULAR: No chest pain, No palpitations  GASTROINTESTINAL: No abd pain, No nausea, No vomiting, No hematemesis, No diarrhea or constipation. No melena, No hematochezia.  GENITOURINARY: No dysuria, No  hematuria  NEUROLOGICAL: No diplopia, bilat leg/foot paresthesia, +L foot drop  MUSCULOSKELETAL: No arthralgia, No myalgia  SKIN: No rashes, or lesions   PSYCH: no anxiety, no suicidal ideation    All other review of systems is negative unless indicated above    Vital Signs Last 24 Hrs  T(C): 36.5 (03 Aug 2022 08:47), Max: 36.8 (02 Aug 2022 14:12)  T(F): 97.7 (03 Aug 2022 08:47), Max: 98.2 (02 Aug 2022 14:12)  HR: 73 (03 Aug 2022 08:47) (73 - 87)  BP: 143/94 (03 Aug 2022 08:47) (134/87 - 143/94)  BP(mean): 100 (02 Aug 2022 21:08) (100 - 100)  RR: 18 (03 Aug 2022 08:47) (18 - 18)  SpO2: 97% (03 Aug 2022 08:47) (97% - 99%)    Parameters below as of 03 Aug 2022 08:47  Patient On (Oxygen Delivery Method): room air            PHYSICAL EXAM:    GENERAL: NAD  HEENT:  NC/AT, EOMI, PERRLA, No scleral icterus, Moist mucous membranes  NECK: Supple, No JVD  CNS:  Alert & Oriented X3, Motor Strength 5/5 B/L upper and lower extremities; L foot drop, Rt foot no motor deficits , reports bilateral paresthesias in the legs below the knee   LUNG: Normal Breath sounds, Clear to auscultation bilaterally, No rales, No rhonchi, No wheezing  HEART: RRR; No murmurs, No rubs  ABDOMEN: +BS, ST/ND/NT  GENITOURINARY: Voiding, Bladder not distended  EXTREMITIES:  2+ Peripheral Pulses, No clubbing, No cyanosis, No tibial edema  MUSCULOSKELTAL: Joints normal ROM, No TTP, No effusion  SKIN: no rashes  RECTAL: deferred, not indicated  BREAST: deferred    a/p:    1. RLE and saddle paresthesia:  r/o TM, r/o other demyelinating process  MRI lumbar and thoracic spine noted  LP done: no evidence of infection, increased Proteins points toward an inflammatory process ; will start solumedrol IV 1gm daily x 3-5days  MRI lumbar spine with contrast ordered by neurology but unable to perform at  due to patients size      2. Htn:   c/w ACEinh/HCTZ    3. H/o chemotherapy induced peripheral neuropathy    4. DVT prophy: hold Heparinoids due to presence of RBC in spinal fluid    5. h/o Colon CA, h/o LLE sarcoma, ? h/o renal CA vs renal cysts:  will call Hem/Onc evaluation with Dr Meyer/Suman who have access to outpatient records

## 2022-08-03 NOTE — PROGRESS NOTE ADULT - SUBJECTIVE AND OBJECTIVE BOX
Pt continues to have numbness left leg - in addition to chronic left foot drop, is able to walk with cane, has slapping gait. No weakness of proximal LE muscles, no more bowel incontinence of any bladder issues.     No HA, visual blurring, numbness/ weakness of upper extremities  Afebrile    LP done under IR on 8/2 reveals: CSF Protien 188, Glucose 61, Cells < 2, RBC 1083,   PCR - negative, Gram stain neg  Color - Initially tinged, then xanthochromia.     MRI thoraco-lumbar spine results reviewed with Dr. Chiang. There is increased signal intensity in the lower thoracic cord / conus with cord expansion, differential   diagnosis includes infectious inflammatory demyelinating, leptomeningeal ds cannot be excluded. MRI with contrast is needed.    ROS: As above, other ROS - Negative     MEDICATIONS  (STANDING):  hydrochlorothiazide 25 milliGRAM(s) Oral daily  lisinopril 20 milliGRAM(s) Oral daily  polyethylene glycol 3350 17 Gram(s) Oral two times a day  psyllium Powder 1 Packet(s) Oral every 12 hours      Vital Signs Last 24 Hrs  T(C): 36.5 (03 Aug 2022 08:47), Max: 36.8 (02 Aug 2022 14:12)  T(F): 97.7 (03 Aug 2022 08:47), Max: 98.2 (02 Aug 2022 14:12)  HR: 73 (03 Aug 2022 08:47) (73 - 87)  BP: 143/94 (03 Aug 2022 08:47) (134/87 - 143/94)  BP(mean): 100 (02 Aug 2022 21:08) (100 - 100)  RR: 18 (03 Aug 2022 08:47) (18 - 18)  SpO2: 97% (03 Aug 2022 08:47) (97% - 99%)      Physical Exam:  Normocephalic, NAD  HEENT: PERRLA, EOMI  Neck: Supple.  Extremities:  no edema  Vascular: Carotid Bruit - no  Musculoskeletal: no abnormal movements  Skin: No rashes      Neurological exam:  HF: A x O x 3. Appropriately interactive, normal affect. Speech fluent, No Aphasia or paraphasic errors. Naming /repetition intact   CN: FRANCISCO, EOMI, VFF, facial sensation normal, no NLFD, tongue midline, Palate moves equally, SCM equal bilaterally  Motor: No pronator drift, Strength 5/5 in BUE and RLE,, L HF 5-/5, KE 5/5, DF 0/5  Sens: loss of PP left L4/5-L5/S1 dermatomes  Reflexes: UE's 1+, LE absent, downgoing toes b/l, negative clonus  Coord:  No FNFA  Gait/Balance: Slapping gait    Labs:  Lyme - neg  CPK - 295                        14.4   5.25  )-----------( 137      ( 02 Aug 2022 07:17 )             42.8     08-02    139  |  109<H>  |  14  ----------------------------<  119<H>  4.0   |  26  |  0.85    Ca    9.0      02 Aug 2022 07:17    TPro  x   /  Alb  3453<L>  /  TBili  x   /  DBili  x   /  AST  x   /  ALT  x   /  AlkPhos  x   08-02    Radiology report:  < from: MR Thoracic Spine No Cont (08.01.22 @ 20:39) >  1. Poorly marginated STIR and T2 hyperintensity is seen involving the distal  cord and conus with a suggestion of possible cord expansion and this could  reflect cord edema versus myelomalacia/gliosis.  No intramedullary signal  abnormalities are detected at remaining thoracic levels and there is no  evidence of thoracic cord compression seen at any level.  2. Posterior central disc protrusions/disc bulging seen between T7-8 and   T10-11 with no canal stenosis or cord compression detected at these or remaining  thoracic levels.    Final report:  Agree with preliminary report.  Increased signal intensity in the conus with cord expansion, differential   diagnosis includes infectious inflammatory demyelinating, recommend   correlation with postcontrast scan.      < from: MR Lumbar Spine No Cont (08.01.22 @ 20:55) >  IMPRESSION:  1. Poorly marginated STIR and T2 hyperintensity involving the distal cord and  conus could reflect cord edema versus myelomalacia/gliosis and correlation with  clinical data is requested.  2. Broad-based posterior disc bulges are seen atL3-L4 and L4-L5 with the  central canal adequate at these and all remaining lumbar levels.  Right  foraminal disc protrusion at L4-L5 results in right foraminal narrowing   withneural foramina adequate at remaining lumbar levels.    Final report: Correlation with thoracic spine obtained concurrently   demonstrates increased signal in the cord the conus which is nonspecific   but may represent infection inflammation or demyelination. Recommend   repeat thoracic spine with contrast.    < end of copied text >

## 2022-08-03 NOTE — PROGRESS NOTE ADULT - SUBJECTIVE AND OBJECTIVE BOX
Had a long discussion with case with the patient's mother and father and the patient himself.  Briefly, the patient was evaluated approximately a month ago in our office in regards to progressively worsening lower extremity function.  The patient was arranged several tests but unfortunately did not complete any of these images or tests for various reasons.  The patient then presented to the emergency department on August 1, 2022 with an episode of fecal incontinence.  The patient was recommended an MRI scan of the thoracic and lumbar spine but, due due to logistical reasons, could not obtain an MRI scan at [facility].  The patient was subsequently transferred to [facility] where he obtained an MRI scan which demonstrated T2 hyperintensity in the conus medullaris suggestive of infectious, inflammatory, or neoplastic origin.  At that time, the patient was recommended a neurology work-up based on these MRI scans but the patient was subsequently transferred back to [facility].  Te patient was subsequently assessed by infectious diseases and neurology and a lumbar puncture was performed. The patient had recent positive blood work for Babesia but PCR was negative.  Other CSF results included elevated protein, albumin, IgG, and IgG synthesis. However, the IgG albumin ratio in the CSF was within normal limits.  Moreover, although the CSF appearance was documented as clear, the spun appearance was documented as xanthochromic which would be very unusual.  This may be a technical error and will be further investigated.  Neutrophils were noted in the CSF but this was also noted to be a bloody tap.    Taken together, I explained to the patient he does not currently have a neurosurgical lesion requiring urgent intervention.  However, the exact cause of the patient's current radiographic findings is still unknown.  The infectious work-up has returned mostly negative and thus has fallen in the differential.  The patient's recent renal cell carcinoma diagnosis could suggest a paraneoplastic syndrome but other inflammatory and autoimmune conditions remain on the differential.  Leptomeningeal dissemination is also a possibility but this would not cause focal T2 signal changes in the conus. Given the patient's history of cancer, metastatic lesions remain on the differential, but intrinsic and infiltrative lesions of the spinal cord as a result of metastatic cancer would be exceedingly rare.  A primary spinal cord tumor such as an astrocytoma spontaneously developing would also remain on the differential but, if confirmed, this would now represent a fourth primary cancer in this patient. At this time it is unclear if the patient is ever been investigated for genetic oncologic disorders but the patient's oncology records are being obtained. The patient is also scheduled to start steroid treatment shortly and is awaiting MRI scans as per his neurologist. I have reiterated to the patient and his family that the neurosurgical service is currently not primarily involved in the patient's care, but will remain available at any time to help as needed.

## 2022-08-03 NOTE — PROGRESS NOTE ADULT - SUBJECTIVE AND OBJECTIVE BOX
Date of service: 08-03-22 @ 12:17      Patient anxious about his diagnosis; wants to transfer to [facility]; had discussion earlier with neurology about lumbar puncture results, afebrile      ROS: no fever or chills; denies dizziness, no HA, no SOB or cough, no abdominal pain, no diarrhea or constipation; no dysuria, no urinary frequency, no legs pain, no rashes    MEDICATIONS  (STANDING):  hydrochlorothiazide 25 milliGRAM(s) Oral daily  lisinopril 20 milliGRAM(s) Oral daily  polyethylene glycol 3350 17 Gram(s) Oral two times a day  psyllium Powder 1 Packet(s) Oral every 12 hours    MEDICATIONS  (PRN):  acetaminophen     Tablet .. 650 milliGRAM(s) Oral every 6 hours PRN Temp greater or equal to 38C (100.4F), Mild Pain (1 - 3)  aluminum hydroxide/magnesium hydroxide/simethicone Suspension 30 milliLiter(s) Oral every 4 hours PRN Dyspepsia  melatonin 3 milliGRAM(s) Oral at bedtime PRN Insomnia  ondansetron Injectable 4 milliGRAM(s) IV Push every 8 hours PRN Nausea and/or Vomiting      Vital Signs Last 24 Hrs  T(C): 36.5 (03 Aug 2022 08:47), Max: 36.8 (02 Aug 2022 14:12)  T(F): 97.7 (03 Aug 2022 08:47), Max: 98.2 (02 Aug 2022 14:12)  HR: 73 (03 Aug 2022 08:47) (73 - 87)  BP: 143/94 (03 Aug 2022 08:47) (134/87 - 143/94)  BP(mean): 100 (02 Aug 2022 21:08) (100 - 100)  RR: 18 (03 Aug 2022 08:47) (18 - 18)  SpO2: 97% (03 Aug 2022 08:47) (97% - 99%)    Parameters below as of 03 Aug 2022 08:47  Patient On (Oxygen Delivery Method): room air            Physical Exam:            Constitutional: frail looking  HEENT: NC/AT, EOMI, PERRLA, conjunctivae clear; ears and nose atraumatic; pharynx clear  Neck: supple; thyroid not palpable  Back: no tenderness  Respiratory: respiratory effort normal; clear to auscultation  Cardiovascular: S1S2 regular, no murmurs  Abdomen: soft, not tender, not distended, positive BS; no liver or spleen organomegaly  Genitourinary: no suprapubic tenderness  Musculoskeletal: no muscle tenderness, mild peripheral lower extremity edema  Neurological/ Psychiatric: AxOx3, judgement and insight normal;  moving all extremities  Skin: no rashes; no palpable lesions    Labs: all available labs reviewed                         Labs:                        14.4   5.25  )-----------( 137      ( 02 Aug 2022 07:17 )             42.8     08-02    139  |  109<H>  |  14  ----------------------------<  119<H>  4.0   |  26  |  0.85    Ca    9.0      02 Aug 2022 07:17    TPro  x   /  Alb  3453<L>  /  TBili  x   /  DBili  x   /  AST  x   /  ALT  x   /  AlkPhos  x   08-02           Cultures:       Culture - CSF with Gram Stain (collected 08-02-22 @ 16:00)  Source: .CSF  Gram Stain (08-03-22 @ 03:45):    No polymorphonuclear cells seen    No organisms seen    by cytocentrifuge    Babesia microti PCR, Blood. (collected 08-02-22 @ 15:39)  Source: .Blood None  Final Report (08-02-22 @ 22:19):    Babesia microti PCR    Results: NOT detected    ***************Result Note*************    The detection of Babesia microti by PCR has only been    validated for whole blood; this test has not been approved    by the US Food and Drug Administration (FDA). Performance    characteristics of this assay have been determined by    [employer]. The clinical significance    of results should be considered in conjunction with the    overall clinical presentation of the patient. Result is not    intended to be used as the sole means for clinical diagnosis    or patient management decisions.    One negative sample does not necessarily rule    out the presence of a parasitic infection.    Culture - Urine (collected 08-01-22 @ 09:08)  Source: Clean Catch None  Final Report (08-02-22 @ 09:23):    <10,000 CFU/mL Normal Urogenital Margy      Protein, CSF (08.02.22 @ 16:00)    Protein, CSF: 188 mg/dL    Glucose, CSF (08.02.22 @ 16:00)    Glucose, CSF: 61 mg/dL      Cerebrospinal Fluid Cell Count-1 (08.02.22 @ 16:00)    Tube Type: Tube 3    RBC Count - Spinal Fluid: 1083 /uL    Total Nucleated Cell Count, CSF: 2 /uL    CSF Appearance: Clear    CSF Comments: see note: 50 cells counted    CSF Lymphocytes: 80 %    CSF Segmented Neutrophils: 20 %    Appearance Spun: Xanthochromic    CSF Color: Xanthochromic                    CSF PCR Panel (08.02.22 @ 16:00)    CSF PCR Result: NotDetec: The meningitis/encephalitis (ME) panel (CSFPCR) is a PCR based assay that  screens for: Escherichia coli; Haemophilus influenzae; Listeria  monocytogenes; Neisseria meningitidis; Streptococcus agalactiae;  Streptococcus pneumoniae; CMV; Enterovirus; HSV-1; HSV-2; Human  herpesvirus 6; Parechovirus; VZV and Cryptococcus. Result should be  interpreted in context of clinical presentation, imaging and other lab  tests. Positive predictive value may be lower in patients with normal CSF  chemistry and cell count.      Borrelia burgdorferi IgG/IgM Antibodies (08.01.22 @ 23:55)    LYME IgG/IgM Antibodies Result: 0.17 Index    Lyme C6 Interpretation: Negative: A negative result may occur in patients recently (< 14 days) infected  with Borrelia burgdorferi. If early Lyme disease is suspected, consider  collecting a new sample 2 – 4 weeks later for repeat testing.  Reference Range: (expressed as Index values)  < 0.90             Negative  0.90 - 1.09      Equivocal  >= 1.10           Positive  CDC/ASTPHLD Guidelines recommend that all samples judged equivocal or  positive be re-tested by confirmatory immunoassays.  Method: Chemiluminescent Immunoassay        < from: MR Lumbar Spine No Cont (08.01.22 @ 20:55) >    IMPRESSION:  1. Poorly marginated STIR and T2 hyperintensity involving the distal cord   and  conus could reflect cord edema versus myelomalacia/gliosis and   correlation with  clinical data is requested.  2. Broad-based posterior disc bulges are seen atL3-L4 and L4-L5 with the  central canal adequate at these and all remaining lumbar levels.  Right  foraminal disc protrusion at L4-L5 results in right foraminal narrowing   with  neural foramina adequate at remaining lumbar levels.    Final report: Correlation with thoracic spine obtained concurrently   demonstrates increased signal in the cord the conus which is nonspecific   but may represent infection inflammation or demyelination. Recommend   repeat thoracic spine with contrast.    < end of copied text >          < from: MR Thoracic Spine No Cont (08.01.22 @ 20:39) >    IMPRESSION:  1. Poorly marginated STIR and T2 hyperintensity is seen involving the   distal  cord and conus with a suggestion of possible cord expansion and this could  reflect cord edema versus myelomalacia/gliosis.  No intramedullary signal  abnormalities are detected at remaining thoracic levels and there is no  evidence of thoracic cord compression seen at any level.  2. Posterior central disc protrusions/disc bulging seen between T7-8 and   T10-11  with no canal stenosis or cord compression detected at these or remaining  thoracic levels.    Final report:  Agree with preliminary report.  Increased signal intensity in the conus with cord expansion, differential   diagnosis includes infectious inflammatory demyelinating, recommend   correlation with postcontrast scan.    < end of copied text >                  Radiology: all available radiological tests reviewed    Advanced directives addressed: full resuscitation

## 2022-08-03 NOTE — PROGRESS NOTE ADULT - ASSESSMENT
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 Denies fever or chills, has been in the woods, but denies ticks or bugs bites; travel just to [place]. Has dog, works in finance, 2 young children ages 9 and 11 years old; was COVID-19 vaccinated times 2. 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     1. Patient admitted with parasthesias, unclear if infectious process versus autoimmune or inflammatory process or secondary to malignancy process  - follow up cultures   - serial cbc and monitor temperature   - iv hydration and supportive care   - reviewed prior medical records to evaluate for resistant or atypical pathogens   - Lyme is negative which could have been causative for inflammatory process, and Babesia is usually not causative for neurologic processes  - Babesia PCR negative, as well as CSF PCR; further workup needed for noninfectious disease causes of patient symptoms  - I will follow up WNV, Ehrlichea, VDRL  - patient will need transfer for further imaging etc  - neurology evaluation in progress  2. other issues: per medicine  If further ID issues please reconsult

## 2022-08-03 NOTE — PROGRESS NOTE ADULT - ASSESSMENT
47 year old obese man with a PMHx of HTN, rectal cancer s/p res/chemo/RT 4 years ago,  peripheral neuropathy, S/P sarcoma resection from LLE - lumbar DJD - chronic left foot drop , recently diagnosed with renal CA, has had paresthesias left leg x a year, has been seen by number of specialists, lumbar surgery was being contemplated, he presented to  ED on 8/2 with complaint of worsening RLE paresthesias/weakness x 3 weeks and saddle anesthesia x 4 days, had an episode of bowel incontinence 4 days ago.    LP under IR: CSF Protien 188, Glucose 61, Cells < 2, RBC 1083, PCR - negative, IgG index 0.7, Gr stain neg, Color - Initially tinged, then xanthochromia.     MRI thoraco-lumbar spine results reviewed with Dr. Chiang. increased signal intensity in the lower thoracic cord / conus with cord expansion, differential   diagnosis includes infectious inflammatory demyelinating, leptomeningeal ds cannot be excluded. MRI with contrast is needed.    # Evolving left lower extremity paresthesias + weakness + 1 bowel incontinence episode + saddle anesthesia; possibility of lower thoracic transverse myelitis / conus lesion vs demyelinating - inflammatory ds, leptomeningal ds, remote possibility L/S plexopathy -RT related.    CSF reveals elevated protein, no cells + neg PCR's rule out infectious process, findings so far inconclusive, high protien can be seen in number of chronic conditions including autoimmune / demyelinating ds.  Xanthochromia still questionable.    Pt will need MRI cervical/ thoracic and lumbar spine to determine extent and definitive etiological process, due to his body habitus, unable to fit in  MRI machine, logistics of having MRI's at outside  facility being worked out with Radiology chief.    - I will consider starting  pulse dose steroids, Solumedrol I GM daily x 5 days with GI prophylaxis for myelitis till MRI scans are being arranged, pt refuses to go to other  hospitals - [facility] for MRI's  - Need neurosurgery input reg possibility of any neoplastic process  - Pt will need EMG/NCV studies to rule out demyelinating disease/CIDP, can be done as OP  - ESR, CRP,  B12, folate,   - PT eval  - DVT prophylaxis    Above discussed with patient at length, co-ordination of care D/W Dr. Marks, Dr. Gaspar, Dr. Irizarry and Dr. Holland     47 year old obese man with a PMHx of HTN, rectal cancer s/p res/chemo/RT 4 years ago,  peripheral neuropathy, S/P sarcoma resection from LLE - lumbar DJD - chronic left foot drop , recently diagnosed with renal CA, has had paresthesias left leg x a year, has been seen by number of specialists, lumbar surgery was being contemplated, he presented to  ED on 8/2 with complaint of worsening RLE paresthesias/weakness x 3 weeks and saddle anesthesia x 4 days, had an episode of bowel incontinence 4 days ago.    LP under IR: CSF Protien 188, Glucose 61, Cells < 2, RBC 1083, PCR - negative, IgG index 0.7, Gr stain neg, Color - Initially tinged, then xanthochromia.     MRI thoraco-lumbar spine results reviewed with Dr. Chiang. increased signal intensity in the lower thoracic cord / conus with cord expansion, differential   diagnosis includes infectious inflammatory demyelinating, leptomeningeal ds cannot be excluded. MRI with contrast is needed.    # Evolving left lower extremity paresthesias + weakness + 1 bowel incontinence episode + saddle anesthesia; possibility of lower thoracic transverse myelitis / conus lesion vs demyelinating - inflammatory ds, leptomeningal ds, remote possibility L/S plexopathy -RT related.    CSF reveals elevated protein, no cells + neg PCR's rule out infectious process, findings so far inconclusive, high protien can be seen in number of chronic conditions including autoimmune / demyelinating ds.  Xanthochromia still questionable.    Pt will need MRI cervical/ thoracic and lumbar spine to determine extent and definitive etiological process, due to his body habitus, unable to fit in  MRI machine, logistics of having MRI's at outside  facility being worked out with Radiology chief.    - I will consider starting  pulse dose steroids, Solumedrol I GM daily x 5 days with GI prophylaxis for myelitis till MRI scans are being arranged, pt refuses to go to other  hospitals - [facility] for MRI's  - Need neurosurgery input reg possibility of any neoplastic process  - Pt will need EMG/NCV studies to rule out demyelinating disease/CIDP, can be done as OP  - F/U CSF - Lyme, ACR, RPR and WNV, cytology added  - ESR, CRP,  B12, folate,   - PT eval  - DVT prophylaxis    Above discussed with patient at length, co-ordination of care D/W Dr. Marks, Dr. Gaspar, Dr. Irizarry and Dr. Holland

## 2022-08-04 DIAGNOSIS — R93.7 ABNORMAL FINDINGS ON DIAGNOSTIC IMAGING OF OTHER PARTS OF MUSCULOSKELETAL SYSTEM: ICD-10-CM

## 2022-08-04 LAB
A PHAGOCYTOPH IGG TITR SER IF: SIGNIFICANT CHANGE UP TITER
B BURGDOR AB SER QL IA: NEGATIVE — SIGNIFICANT CHANGE UP
B MICROTI IGG TITR SER: SIGNIFICANT CHANGE UP TITER
CRP SERPL-MCNC: 3 MG/L — SIGNIFICANT CHANGE UP
E CHAFFEENSIS IGG TITR SER IF: SIGNIFICANT CHANGE UP TITER

## 2022-08-04 PROCEDURE — 99232 SBSQ HOSP IP/OBS MODERATE 35: CPT

## 2022-08-04 PROCEDURE — 99233 SBSQ HOSP IP/OBS HIGH 50: CPT

## 2022-08-04 RX ORDER — ENOXAPARIN SODIUM 100 MG/ML
40 INJECTION SUBCUTANEOUS EVERY 24 HOURS
Refills: 0 | Status: DISCONTINUED | OUTPATIENT
Start: 2022-08-04 | End: 2022-08-08

## 2022-08-04 RX ADMIN — Medication 650 MILLIGRAM(S): at 22:04

## 2022-08-04 RX ADMIN — PANTOPRAZOLE SODIUM 40 MILLIGRAM(S): 20 TABLET, DELAYED RELEASE ORAL at 06:54

## 2022-08-04 RX ADMIN — ENOXAPARIN SODIUM 40 MILLIGRAM(S): 100 INJECTION SUBCUTANEOUS at 18:06

## 2022-08-04 RX ADMIN — Medication 86 MILLIGRAM(S): at 06:54

## 2022-08-04 RX ADMIN — Medication 1 PACKET(S): at 21:05

## 2022-08-04 RX ADMIN — Medication 25 MILLIGRAM(S): at 10:07

## 2022-08-04 RX ADMIN — LISINOPRIL 20 MILLIGRAM(S): 2.5 TABLET ORAL at 10:07

## 2022-08-04 RX ADMIN — POLYETHYLENE GLYCOL 3350 17 GRAM(S): 17 POWDER, FOR SOLUTION ORAL at 10:06

## 2022-08-04 RX ADMIN — Medication 3 MILLIGRAM(S): at 21:06

## 2022-08-04 RX ADMIN — Medication 1 PACKET(S): at 10:06

## 2022-08-04 RX ADMIN — Medication 650 MILLIGRAM(S): at 21:06

## 2022-08-04 NOTE — PROGRESS NOTE ADULT - SUBJECTIVE AND OBJECTIVE BOX
History of Present Illness:   47 year old obese man with a PMHx of colorectal cancer s/p resection and chemotherapy 4 years ago, peripheral neuropathy, LLE sarcoma s/p resection, chronic left foot drop and paresthesia  for more than 1yr, Htn,  kidney cyst presented to  ED  for evaluation of B/L leg paresthesias and weakness. Pt initially presented to  ED yesterday, neurosurgery had seen pt and recommended MRI T-spine, but pt was unable to fit into MRI machine so was transferred to [facility]. At [facility] and after his MRI was completed, pt requested to be transferred back to .  Patient explains that sx began about one yr ago in his Left leg, with swelling ,  numbness and tingling. He notes that at that time, he was in the woods a lot, and just recently he had blood work done which came back positive for Lyme or Babesiosis?    For the past few weeks  he has noted swelling and progressive numbness in his Right leg as well and also he reports some saddle numbness for the past few days.  Pt admits to one episode of bowel incontinence this past Saturday. Left leg paresthesias still feel worse than the right. Paresthesias in both legs do not travel up past the knees. Admits difficulty ambulating d/t weakness in both legs, needs to use a cane. He states he's had worsening vision over the past few weeks.   He otherwise denies HA, speech disturbances, dizziness, lightheadedness, back/neck pain, no fever/chills, no rashes, no recent  insect bites       8.3: symptoms have not changed; still has paresthesia in Rt leg, no motor deficits in the Rt leg  Left foot drop chronic, +L foot paresthesia chronic   8.4: paresthesia improving, able to stand and walk with cane, Rt foot motor deficits improving     REVIEW OF SYSTEMS:    CONSTITUTIONAL: No weakness, No fevers or chills  ENT: No ear ache, No sorethroat  NECK: No pain, No stiffness  RESPIRATORY: No cough, No wheezing, No hemoptysis; No dyspnea  CARDIOVASCULAR: No chest pain, No palpitations  GASTROINTESTINAL: No abd pain, No nausea, No vomiting, No hematemesis, No diarrhea or constipation. No melena, No hematochezia.  GENITOURINARY: No dysuria, No  hematuria  NEUROLOGICAL: No diplopia, bilat leg/foot paresthesia, +L foot drop  MUSCULOSKELETAL: No arthralgia, No myalgia  SKIN: No rashes, or lesions   PSYCH: no anxiety, no suicidal ideation    All other review of systems is negative unless indicated above    Vital Signs Last 24 Hrs  T(C): 36.4 (04 Aug 2022 08:49), Max: 36.4 (03 Aug 2022 16:02)  T(F): 97.6 (04 Aug 2022 08:49), Max: 97.6 (03 Aug 2022 16:02)  HR: 89 (04 Aug 2022 08:49) (79 - 94)  BP: 134/78 (04 Aug 2022 08:49) (114/72 - 140/70)  BP(mean): --  RR: 18 (04 Aug 2022 08:49) (18 - 19)  SpO2: 95% (04 Aug 2022 08:49) (95% - 99%)    Parameters below as of 04 Aug 2022 08:49  Patient On (Oxygen Delivery Method): room air                PHYSICAL EXAM:    GENERAL: NAD  HEENT:  NC/AT, EOMI, PERRLA, No scleral icterus, Moist mucous membranes  NECK: Supple, No JVD  CNS:  Alert & Oriented X3, Motor Strength 5/5 B/L upper and lower extremities; L foot drop, Rt foot no motor deficits , reports bilateral paresthesias in the legs below the knee   LUNG: Normal Breath sounds, Clear to auscultation bilaterally, No rales, No rhonchi, No wheezing  HEART: RRR; No murmurs, No rubs  ABDOMEN: +BS, ST/ND/NT  GENITOURINARY: Voiding, Bladder not distended  EXTREMITIES:  2+ Peripheral Pulses, No clubbing, No cyanosis, No tibial edema  MUSCULOSKELTAL: Joints normal ROM, No TTP, No effusion  SKIN: no rashes  RECTAL: deferred, not indicated  BREAST: deferred    a/p:    1. RLE and saddle paresthesia:  r/o TM, r/o other demyelinating process  MRI lumbar and thoracic spine noted  LP done: no evidence of infection, increased Proteins points toward an inflammatory process ; will start solumedrol IV 1gm daily x 3-5days  MRI lumbar spine with contrast ordered by neurology but unable to perform at  due to patients size; to be done as outpatient       2. Htn:   c/w ACEinh/HCTZ    3. H/o chemotherapy induced peripheral neuropathy    4. DVT prophy: hold Heparinoids due to presence of RBC in spinal fluid    5. h/o Colon CA, h/o LLE sarcoma, ? h/o renal CA vs renal cysts:  will call Hem/Onc evaluation with Dr Meyer/Suamn who have access to outpatient records     6. DVT prophy: ok to start Lovenox

## 2022-08-04 NOTE — CONSULT NOTE ADULT - SUBJECTIVE AND OBJECTIVE BOX
HPI:  47 year old obese man with a PMHx of colorectal cancer s/p resection and chemotherapy 4 years ago, peripheral neuropathy, chronic left foot drop for more than 1yr, Htn,  kidney cyst presented to  ED  for evaluation of B/L leg paresthesias and weakness. Pt initially presented to  ED yesterday, neurosurgery had seen pt and recommended MRI T-spine, but pt was unable to fit into MRI machine so was transferred to [facility]. At [place] after his MRI was completed, pt requested to be transferred back to .    Upon questioning, pt explains that sx began about one yr ago in his left leg, his lower left leg and foot became swollen, with numbness and tingling. He notes that at that time, he was in the woods a lot, and just recently he had blood work done which came back positive for Lyme or Babesiosis.  For the past few weeks  he has noted swelling and progressive numbness in his Left leg as well and also he reports some saddle numbness for the past few days.  Pt admits to one episode of bowel incontinence this past Saturday. Left leg paresthesias still feel worse than sx in right leg, but he's never had sx in right leg previously. Paresthesias in both legs do not travel up past the knees. Admits difficulty ambulating d/t weakness in both legs, needs to use a cane. He states he's had worsening vision over the past few weeks.   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  Rectal cancer, completed chemotherapy  Status post proctocolectomy  with temporary Ileostomy- reversed 2019  Peripheral neuropathy  port cath insertion 08/2018      FAMILY HISTORY:  Father: Htn, Valve replacements, renal transplant     Social History:   Nonsmoker, no drug or alcohol use            REVIEW OF SYSTEMS:    CONSTITUTIONAL: No weakness, No fevers or chills  ENT: No ear ache, No sorethroat  NECK: No pain, No stiffness  RESPIRATORY: No cough, No wheezing, No hemoptysis; No dyspnea  CARDIOVASCULAR: No chest pain, No palpitations  GASTROINTESTINAL: No abd pain, No nausea, No vomiting, No hematemesis, No diarrhea or constipation. No melena, No hematochezia.  GENITOURINARY: No dysuria, No  hematuria  NEUROLOGICAL: No diplopia, bilat leg/foot paresthesia, +L foot drop  MUSCULOSKELETAL: No arthralgia, No myalgia  SKIN: No rashes, or lesions   PSYCH: no anxiety, no suicidal ideation    All other review of systems is negative unless indicated above    Vital Signs Last 24 Hrs  T(C): 36.4 (02 Aug 2022 14:52), Max: 37.1 (02 Aug 2022 10:24)  T(F): 97.5 (02 Aug 2022 14:52), Max: 98.7 (02 Aug 2022 10:24)  HR: 76 (02 Aug 2022 14:52) (71 - 91)  BP: 134/87 (02 Aug 2022 14:52) (132/92 - 158/90)  BP(mean): --  RR: 18 (02 Aug 2022 14:52) (18 - 20)  SpO2: 98% (02 Aug 2022 14:52) (96% - 100%)    Parameters below as of 02 Aug 2022 14:52  Patient On (Oxygen Delivery Method): room air        PHYSICAL EXAM:    GENERAL: NAD  HEENT:  NC/AT, EOMI, PERRLA, No scleral icterus, Moist mucous membranes  NECK: Supple, No JVD  CNS:  Alert & Oriented X3, Motor Strength 5/5 B/L upper and lower extremities; L foot drop, Rt foot no motor deficits , reports bilateral paresthesias in the legs below the knee   LUNG: Normal Breath sounds, Clear to auscultation bilaterally, No rales, No rhonchi, No wheezing  HEART: RRR; No murmurs, No rubs  ABDOMEN: +BS, ST/ND/NT  GENITOURINARY: Voiding, Bladder not distended  EXTREMITIES:  2+ Peripheral Pulses, No clubbing, No cyanosis, No tibial edema  MUSCULOSKELTAL: Joints normal ROM, No TTP, No effusion  SKIN: no rashes  RECTAL: deferred, not indicated  BREAST: deferred                          14.4   5.25  )-----------( 137      ( 02 Aug 2022 07:17 )             42.8     08-02    139  |  109<H>  |  14  ----------------------------<  119<H>  4.0   |  26  |  0.85    Ca    9.0      02 Aug 2022 07:17  Mg     2.0     08-01    TPro  7.5  /  Alb  3.8  /  TBili  1.1  /  DBili  x   /  AST  33  /  ALT  42  /  AlkPhos  67  08-02    Vancomycin levels:   Cultures:     MEDICATIONS  (STANDING):  lisinopril 20 milliGRAM(s) Oral daily  polyethylene glycol 3350 17 Gram(s) Oral two times a day    MEDICATIONS  (PRN):  acetaminophen     Tablet .. 650 milliGRAM(s) Oral every 6 hours PRN Temp greater or equal to 38C (100.4F), Mild Pain (1 - 3)  aluminum hydroxide/magnesium hydroxide/simethicone Suspension 30 milliLiter(s) Oral every 4 hours PRN Dyspepsia  melatonin 3 milliGRAM(s) Oral at bedtime PRN Insomnia  ondansetron Injectable 4 milliGRAM(s) IV Push every 8 hours PRN Nausea and/or Vomiting      all labs reviewed  all imaging reviewed      a/p:    1. RLE and saddle paresthesia:  r/o TM, r/o other demyelinating process  MRI lumbar and thoracic spine noted  LP ordered by neurology   MRI lumbar spine with contrast ordered by neurology but unable to perform at  due to patients size  If signs of inflammation on LP, will start Steroids    2. Htn:   c/w ACEinh    3. H/o chemotherapy induced peripheral neuropathy    4. DVT prophy: Lovenox    (02 Aug 2022 15:05)      PAST MEDICAL & SURGICAL HISTORY:  Essential hypertension      Seasonal allergies      Mild intermittent asthma without complication      Iron deficiency anemia due to chronic blood loss      Lumbar herniated disc      Obstructive sleep apnea syndrome  non compliant with CPAP      Sarcoma      Foot drop, left      Morbid obesity      Port-A-Cath in place  right chest wall inserted 2018      Hearing loss  b/l      Anemia  history of anemia      Rectal cancer  completed chemotherapy      COVID-19 vaccine series completed  [employer]- March      Peripheral neuropathy  chemotherpay induced peripheral neuropathy      History of colon surgery  for rectal cancer- ileostomy creatio and reversal in 2019      H/O myringotomy  bilateral. 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      S/P colonoscopy      History of other surgery  port cath insertion 08/2018      Status post proctocolectomy  with temporary Ileostomy- reversed 2019          Allergies    iodinated radiocontrast agents (Rash; Urticaria; Flushing; Hives)    Intolerances        MEDICATIONS  (STANDING):  enoxaparin Injectable 40 milliGRAM(s) SubCutaneous every 24 hours  hydrochlorothiazide 25 milliGRAM(s) Oral daily  lisinopril 20 milliGRAM(s) Oral daily  methylPREDNISolone sodium succinate IVPB 1000 milliGRAM(s) IV Intermittent daily  pantoprazole    Tablet 40 milliGRAM(s) Oral before breakfast  polyethylene glycol 3350 17 Gram(s) Oral two times a day  psyllium Powder 1 Packet(s) Oral every 12 hours    MEDICATIONS  (PRN):  acetaminophen     Tablet .. 650 milliGRAM(s) Oral every 6 hours PRN Temp greater or equal to 38C (100.4F), Mild Pain (1 - 3)  aluminum hydroxide/magnesium hydroxide/simethicone Suspension 30 milliLiter(s) Oral every 4 hours PRN Dyspepsia  melatonin 3 milliGRAM(s) Oral at bedtime PRN Insomnia  ondansetron Injectable 4 milliGRAM(s) IV Push every 8 hours PRN Nausea and/or Vomiting      FAMILY HISTORY:  Family history of hypertension (Father)    Family history of renal disease (Father)        SOCIAL HISTORY: No EtOH, no tobacco    REVIEW OF SYSTEMS:    CONSTITUTIONAL: No weakness, fevers or chills  EYES/ENT: No visual changes;  No vertigo or throat pain   NECK: No pain or stiffness  RESPIRATORY: No cough, wheezing, hemoptysis; No shortness of breath  CARDIOVASCULAR: No chest pain or palpitations  GASTROINTESTINAL: No abdominal or epigastric pain. No nausea, vomiting, or hematemesis; No diarrhea or constipation. No melena or hematochezia.  GENITOURINARY: No dysuria, frequency or hematuria  NEUROLOGICAL: No numbness or weakness  SKIN: No itching, burning, rashes, or lesions   All other review of systems is negative unless indicated above.        T(F): 97.6 (08-05-22 @ 07:57), Max: 97.9 (08-04-22 @ 15:05)  HR: 70 (08-05-22 @ 07:57)  BP: 138/79 (08-05-22 @ 07:57)  RR: 18 (08-05-22 @ 07:57)  SpO2: 97% (08-05-22 @ 07:57)  Wt(kg): --    GENERAL: NAD, well-developed  HEAD:  Atraumatic, Normocephalic  EYES: EOMI, PERRLA, conjunctiva and sclera clear  NECK: Supple, No JVD  CHEST/LUNG: Clear to auscultation bilaterally; No wheeze  HEART: Regular rate and rhythm; No murmurs, rubs, or gallops  ABDOMEN: Soft, Nontender, Nondistended; Bowel sounds present  EXTREMITIES:  2+ Peripheral Pulses, No clubbing, cyanosis, or edema  NEUROLOGY: non-focal  SKIN: No rashes or lesions                        .CSF  08-02 @ 16:00   No growth  --    No polymorphonuclear cells seen  No organisms seen  by cytocentrifuge      .Blood None  08-02 @ 15:39   Babesia microti PCR  Results: NOT detected  ***************Result Note*************  The detection of Babesia microti by PCR has only been  validated for whole blood; this test has not been approved  by the US Food and Drug Administration (FDA). Performance  characteristics of this assay have been determined by  [facility]. The clinical significance  of results should be considered in conjunction with the  overall clinical presentation of the patient. Result is not  intended to be used as the sole means for clinical diagnosis  or patient management decisions.  One negative sample does not necessarily rule  out the presence of a parasitic infection.  --  --      .Blood None  08-02 @ 07:17   No growth to date.  --  --      Clean Catch None  08-01 @ 09:08   <10,000 CFU/mL Normal Urogenital Margy  --  --

## 2022-08-04 NOTE — CONSULT NOTE ADULT - ASSESSMENT
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 Denies fever or chills, has been in the woods, but denies ticks or bugs bites; travel just to [place]. Has dog, works in [employer], 2 young children ages 9 and 11 years old; was COVID-19 vaccinated times 2. 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     1. Patient admitted with parasthesias, unclear if infectious process versus autoimmune or inflammatory process or secondary to malignancy process  - follow up cultures   - serial cbc and monitor temperature   - iv hydration and supportive care   - reviewed prior medical records to evaluate for resistant or atypical pathogens   - Lyme is negative which could have been causative for inflammatory process, and Babesia is usually not causative for neurologic processes  - Serum PCR for Babesia and Ehrlichea are pending as well as West Nile virus serology  - Lumbar puncture will also be done with appropriate tests, such as CSF PCR, cell count Lyme csf, glucose, protein and csf [place]  - will also check RPR in serum  - neurology evaluation in progress  2. other issues: per medicine
This is a 47 year old male with history of colon CA followed stage III followed by Dr. Anthony LARES here for worsening gait abnormalities and difficulty with ambulation   - patient was diagnosed , Stage IIIB disease Peter completed [place]­adjuvant therapy, concurrent chemo & RT (5 cycles FOLFOX) and  7 cycles of adjuvant FOLFOX (oxaliplatin d/c'd cycle 8) for total of 12/12 cycles.  - [place] [facility] [place] consistent with RCC planned for CRYOABLATION   - overall patient had a MRI of the LUMBAR SPINE which did not suggest cord compromise,  - the NoN contrast MRI suggested  Poorly marginated STIR and T2 hyperintensity involving the distal cord and conus could reflect cord edema versus myelomalacia/gliosis  - The patient is presently being treated for possible myelitis.   - - agree with steroids   - will still need to proceed with MRI brain with contrast yet may require transitioning to alternative facility for MRI     - present presentation does not favor disease progression in light of the negative LP   Cytology negative   - patient continuing to modestly improved on steroids     discussed with Dr. Gaspar 
47 year old obese man with a PMHx of rectal cancer s/p resection/chemo/RT 4 years ago, HTN, peripheral neuropathy, S/P sarcoma resection from LLE - lumbar DJD - chronic left foot drop , recent diagnosis of renal CA, has had paresthesias in left leg x a year, has been seen by number of specialists, lumbar surgery was being contemplated, he presented to  ED on 8/2 with complaint of worsening RLE paresthesias/weakness x 3 weeks and saddle anesthesia x 4 days, reported an episode of bowel incontinence 4 days ago, lab work has tested positive for Babesia.    MRI T/L spine w/o kam  reveals Increased signal intensity in the conus with cord expansion, differential diagnosis includes infectious, inflammatory, demyelinating, recommend   correlation with postcontrast scan.    # Left lower extremity paresthesias, bowel incont, saddle anesthesia; DD; R/o conus/ cauda equana lesion - demyelinating/ inflammatory/ infiltrtaive/infectious; also L/S plexopathy -RT related    - Spinal tap under IR  - MRI's with kam including MRI Brain and MRI C-spine; was not possible, pt did not want to go back to [facility],  -ESR, CRP,  B12, folate, check all tick titers  -May need EMG/NCV studies if suspicion for demyelinating disease, not available in   -R/o any underlying inflammatory or infectious conditions  -PT eval  -DVT prophylaxis

## 2022-08-04 NOTE — PROGRESS NOTE ADULT - ASSESSMENT
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    LP under IR: CSF Protien 188, Glucose 61, Cells < 2, RBC 1083, PCR - negative, IgG index 0.7, Gr stain neg, Color - Initially tinged, then xanthochromia. Lyme, ACE, RPR, WNV pending    MRI thoraco-lumbar spine results reviewed with Dr. Chiang. increased signal intensity in the lower thoracic cord / conus with cord expansion, differential   diagnosis includes infectious inflammatory demyelinating, leptomeningeal ds cannot be excluded. MRI with contrast is needed.    # Evolving left lower extremity paresthesias + weakness + 1 bowel incontinence episode + saddle anesthesia; possibility of lower thoracic transverse myelitis / conus lesion vs demyelinating - inflammatory ds, leptomeningal ds/ possibility L/S plexopathy -RT related.    CSF reveals elevated protein, no cells + neg PCR's rule out infectious process, findings so far inconclusive, high protien can be seen in number of chronic conditions including autoimmune / demyelinating ds.  Xanthochromia still questionable.    Pt will need MRI cervical/ thoracic and lumbar spine to determine extent and definitive etiological process, due to his body habitus, unable to fit in  MRI machine, MRI's at outside  facility unable to arrange while he is in pt.    - We will continue Solumedrol I GM daily x 5 days with GI prophylaxis for myelitis, then d/c home on oral prednisone taper  - F/U with pending results from CSF - Lyme, ACR, RPR and WNV, cytology and ID  - Pt will have MRI C/T/L spine and brain with kam as OP, on 8th/9th august, neurosurgeon Dr. Marks will send the scripts.  - Pt will have EMG/NCV studies to rule out demyelinating disease/CIDP, with Dr. Reilly on 8/9/22 as OP  - Pt will have rest of w/u as OP    Above plan was D/W patient in detail, he understands, I also D/W Dr. Gaspar, Dr. Marks  and Dr. Holland

## 2022-08-04 NOTE — PROGRESS NOTE ADULT - SUBJECTIVE AND OBJECTIVE BOX
Pt continues to have numbness left leg - in addition to chronic left foot drop, is able to walk with cane, has slapping gait. No new weakness of proximal LE muscles, no more bowel incontinence of any bladder issues.  No HA, visual blurring, numbness/ weakness of upper extremities    Started on Solumedrol 1 mg IVSS - yesterday, day 2 today    LP done under IR on 8/2 reveals: CSF Protien 188, Glucose 61, Cells < 2, RBC 1083,   PCR - negative, Gram stain neg  Color - Initially tinged, then xanthochromia.     ESR/CRP - nl    MRI thoraco-lumbar spine results reviewed with Dr. Chiang. There is increased signal intensity in the lower thoracic cord / conus with cord expansion, differential includes infectious inflammatory demyelinating, leptomeningeal ds cannot be excluded. MRI with contrast is needed.    ROS: As above, other ROS - Negative    MEDICATIONS  (STANDING):  enoxaparin Injectable 40 milliGRAM(s) SubCutaneous every 24 hours  hydrochlorothiazide 25 milliGRAM(s) Oral daily  lisinopril 20 milliGRAM(s) Oral daily  methylPREDNISolone sodium succinate IVPB 1000 milliGRAM(s) IV Intermittent daily  pantoprazole    Tablet 40 milliGRAM(s) Oral before breakfast  polyethylene glycol 3350 17 Gram(s) Oral two times a day  psyllium Powder 1 Packet(s) Oral every 12 hours      Vital Signs Last 24 Hrs  T(C): 36.6 (04 Aug 2022 15:05), Max: 36.6 (04 Aug 2022 15:05)  T(F): 97.9 (04 Aug 2022 15:05), Max: 97.9 (04 Aug 2022 15:05)  HR: 93 (04 Aug 2022 15:05) (89 - 94)  BP: 134/78 (04 Aug 2022 15:05) (134/78 - 140/70)  BP(mean): --  RR: 18 (04 Aug 2022 15:05) (18 - 18)  SpO2: 95% (04 Aug 2022 15:05) (95% - 97%)    Physical Exam:  Normocephalic, NAD  HEENT: PERRLA, EOMI  Neck: Supple.  Extremities:  no edema    Neurological exam:  HF: A x O x 3. Appropriately interactive, normal affect. Speech fluent, No Aphasia or paraphasic errors. Naming /repetition intact   CN: FRANCISCO, EOMI, VFF, facial sensation normal, no NLFD, tongue midline, Palate moves equally, SCM equal bilaterally  Motor: No pronator drift, Strength 5/5 in BUE and RLE,, L HF 5-/5, KE 5/5, DF 0/5  Sens: loss of PP left L4/5-L5/S1 dermatomes  Reflexes: UE's 1+, LE absent, downgoing toes b/l, negative clonus  Coord:  No FNFA  Gait/Balance: Slapping gait    Labs:  ESR 12  CRP 3  Lyme - neg  CPK - 295

## 2022-08-05 ENCOUNTER — APPOINTMENT (OUTPATIENT)
Dept: VASCULAR SURGERY | Facility: CLINIC | Age: 47
End: 2022-08-05

## 2022-08-05 LAB
HCYS SERPL-MCNC: 11.5 UMOL/L — SIGNIFICANT CHANGE UP
RHEUMATOID FACT SERPL-ACNC: <10 IU/ML — SIGNIFICANT CHANGE UP (ref 0–13)
TSH SERPL-MCNC: 0.98 UU/ML — SIGNIFICANT CHANGE UP (ref 0.34–4.82)
VIT B12 SERPL-MCNC: 393 PG/ML — SIGNIFICANT CHANGE UP (ref 232–1245)

## 2022-08-05 PROCEDURE — 99233 SBSQ HOSP IP/OBS HIGH 50: CPT

## 2022-08-05 RX ADMIN — Medication 1 PACKET(S): at 20:57

## 2022-08-05 RX ADMIN — LISINOPRIL 20 MILLIGRAM(S): 2.5 TABLET ORAL at 09:09

## 2022-08-05 RX ADMIN — Medication 25 MILLIGRAM(S): at 09:09

## 2022-08-05 RX ADMIN — Medication 1 PACKET(S): at 09:09

## 2022-08-05 RX ADMIN — Medication 650 MILLIGRAM(S): at 06:02

## 2022-08-05 RX ADMIN — POLYETHYLENE GLYCOL 3350 17 GRAM(S): 17 POWDER, FOR SOLUTION ORAL at 09:09

## 2022-08-05 RX ADMIN — Medication 650 MILLIGRAM(S): at 20:22

## 2022-08-05 RX ADMIN — PANTOPRAZOLE SODIUM 40 MILLIGRAM(S): 20 TABLET, DELAYED RELEASE ORAL at 06:02

## 2022-08-05 RX ADMIN — Medication 86 MILLIGRAM(S): at 06:02

## 2022-08-05 RX ADMIN — ENOXAPARIN SODIUM 40 MILLIGRAM(S): 100 INJECTION SUBCUTANEOUS at 17:10

## 2022-08-05 RX ADMIN — POLYETHYLENE GLYCOL 3350 17 GRAM(S): 17 POWDER, FOR SOLUTION ORAL at 20:57

## 2022-08-05 NOTE — PROGRESS NOTE ADULT - SUBJECTIVE AND OBJECTIVE BOX
History of Present Illness:   47 year old obese man with a PMHx of colorectal cancer s/p resection and chemotherapy 4 years ago, peripheral neuropathy, LLE sarcoma s/p resection, chronic left foot drop and paresthesia  for more than 1yr, Htn,  kidney small malignant lesion,  presented to  ED  for evaluation of B/L leg paresthesias and weakness. Pt initially presented to  ED yesterday, neurosurgery had seen pt and recommended MRI T-spine, but pt was unable to fit into MRI machine so was transferred to [facility]. At [place] and after his MRI was completed, pt requested to be transferred back to .  Patient explains that sx began about one yr ago in his Left leg, with swelling ,  numbness and tingling. He notes that at that time, he was in the woods a lot, and just recently he had blood work done which came back positive for Lyme or Babesiosis?    For the past few weeks  he has noted swelling and progressive numbness in his Right leg as well and also he reports some saddle numbness for the past few days.  Pt admits to one episode of bowel incontinence this past Saturday. Left leg paresthesias still feel worse than the right. Paresthesias in both legs do not travel up past the knees. Admits difficulty ambulating d/t weakness in both legs, needs to use a cane. He states he's had worsening vision over the past few weeks.   He otherwise denies HA, speech disturbances, dizziness, lightheadedness, back/neck pain, no fever/chills, no rashes, no recent  insect bites       8.3: symptoms have not changed; still has paresthesia in Rt leg, no motor deficits in the Rt leg  Left foot drop chronic, +L foot paresthesia chronic   8.4: paresthesia improving, able to stand and walk with cane, Rt foot motor deficits improving   8.5: paresthesia continues to improve, no other new symptoms     REVIEW OF SYSTEMS:    CONSTITUTIONAL: No weakness, No fevers or chills  ENT: No ear ache, No sorethroat  NECK: No pain, No stiffness  RESPIRATORY: No cough, No wheezing, No hemoptysis; No dyspnea  CARDIOVASCULAR: No chest pain, No palpitations  GASTROINTESTINAL: No abd pain, No nausea, No vomiting, No hematemesis, No diarrhea or constipation. No melena, No hematochezia.  GENITOURINARY: No dysuria, No  hematuria  NEUROLOGICAL: No diplopia, bilat leg/foot paresthesia, +L foot drop  MUSCULOSKELETAL: No arthralgia, No myalgia  SKIN: No rashes, or lesions   PSYCH: no anxiety, no suicidal ideation    All other review of systems is negative unless indicated above    Vital Signs Last 24 Hrs  T(C): 36.4 (05 Aug 2022 07:57), Max: 36.6 (04 Aug 2022 15:05)  T(F): 97.6 (05 Aug 2022 07:57), Max: 97.9 (04 Aug 2022 15:05)  HR: 70 (05 Aug 2022 07:57) (70 - 97)  BP: 138/79 (05 Aug 2022 07:57) (132/77 - 138/79)  BP(mean): --  RR: 18 (05 Aug 2022 07:57) (18 - 18)  SpO2: 97% (05 Aug 2022 07:57) (94% - 97%)    Parameters below as of 05 Aug 2022 07:57  Patient On (Oxygen Delivery Method): room air      PHYSICAL EXAM:    GENERAL: NAD  HEENT:  NC/AT, EOMI, PERRLA, No scleral icterus, Moist mucous membranes  NECK: Supple, No JVD  CNS:  Alert & Oriented X3, Motor Strength 5/5 B/L upper and lower extremities; L foot drop, Rt foot no motor deficits , reports bilateral paresthesias in the legs below the knee   LUNG: Normal Breath sounds, Clear to auscultation bilaterally, No rales, No rhonchi, No wheezing  HEART: RRR; No murmurs, No rubs  ABDOMEN: +BS, ST/ND/NT  GENITOURINARY: Voiding, Bladder not distended  EXTREMITIES:  2+ Peripheral Pulses, No clubbing, No cyanosis, No tibial edema  MUSCULOSKELTAL: Joints normal ROM, No TTP, No effusion  SKIN: no rashes  RECTAL: deferred, not indicated  BREAST: deferred    a/p:    1. RLE and saddle paresthesia:  r/o TM, r/o other demyelinating process  leptomeningeal spread unlikely   MRI lumbar and thoracic spine noted  LP done: no evidence of infection, increased Proteins points toward an inflammatory process ; cytology negative in CSF....will start solumedrol IV 1gm daily x 3-5days  MRI lumbar spine with contrast ordered by neurology but unable to perform at  due to patients size; will arrange for transfer to outside MRI if possible       2. Htn:   c/w ACEinh/HCTZ    3. H/o chemotherapy induced peripheral neuropathy    4. DVT prophy: hold Heparinoids due to presence of RBC in spinal fluid    5. h/o Colon CA, h/o LLE sarcoma, ? h/o renal CA vs renal cysts:  will call Hem/Onc evaluation with Dr Meyer/Suman who have access to outpatient records     6. Small renal Ca : [place] [facility] [place]    7. DVT prophy: ok to start Lovenox

## 2022-08-05 NOTE — PROGRESS NOTE ADULT - SUBJECTIVE AND OBJECTIVE BOX
Pt has noted slight improvement of right leg numbness, continues to have numbness left leg - in addition to chronic left foot drop, is able to walk with cane, has slapping gait. No new weakness of proximal LE muscles, no more bowel incontinence of any bladder issues, has noted resolution of saddle anesthesia.    C/O HA today, no visual blurring, numbness/ weakness of upper extremities    On Solumedrol 1 mg IVSS - day 3 today    LP under IR on 8/2 reveals: CSF Protien 188, Glucose 61, Cells < 2, RBC 1083,   PCR - negative, Gram stain neg  Color - Initially tinged, then xanthochromia.   Cytology - neg for malignant cells    ESR/CRP - nl, rest of vasculitis - autoimmune panel -P    MRI thoraco-lumbar spine results reviewed with Dr. Chiang. There is increased signal intensity in the lower thoracic cord / conus with cord expansion, differential includes infectious inflammatory demyelinating, leptomeningeal ds cannot be excluded. MRI with contrast is needed.    ROS: As above, other ROS - Negative      MEDICATIONS  (STANDING):  enoxaparin Injectable 40 milliGRAM(s) SubCutaneous every 24 hours  hydrochlorothiazide 25 milliGRAM(s) Oral daily  lisinopril 20 milliGRAM(s) Oral daily  methylPREDNISolone sodium succinate IVPB 1000 milliGRAM(s) IV Intermittent daily  pantoprazole    Tablet 40 milliGRAM(s) Oral before breakfast  polyethylene glycol 3350 17 Gram(s) Oral two times a day  psyllium Powder 1 Packet(s) Oral every 12 hours      Vital Signs Last 24 Hrs  T(C): 36.4 (05 Aug 2022 07:57), Max: 36.6 (04 Aug 2022 15:05)  T(F): 97.6 (05 Aug 2022 07:57), Max: 97.9 (04 Aug 2022 15:05)  HR: 70 (05 Aug 2022 07:57) (70 - 97)  BP: 138/79 (05 Aug 2022 07:57) (132/77 - 138/79)  BP(mean): --  RR: 18 (05 Aug 2022 07:57) (18 - 18)  SpO2: 97% (05 Aug 2022 07:57) (94% - 97%)    Physical Exam:  Normocephalic, NAD  HEENT: PERRLA, EOMI  Neck: Supple.  Extremities:  no edema    Neurological exam:  HF: A x O x 3. Appropriately interactive, normal affect. Speech fluent, No Aphasia or paraphasic errors. Naming /repetition intact   CN: FRANCISCO, EOMI, VFF, facial sensation normal, no NLFD, tongue midline, Palate moves equally, SCM equal bilaterally  Motor: No pronator drift, Strength 5/5 in BUE and RLE,, L HF 5-/5, KE 5/5, DF 0/5  Sens: loss of PP left L4/5-L5/S1 dermatomes  Reflexes: UE's 1+, LE absent, downgoing toes b/l, negative clonus  Coord:  No FNFA  Gait/Balance: Slapping gait    Labs:  CEREBROSPINAL FLUID: NEGATIVE FOR MALIGNANT CELLS.  ESR 12  CRP 3  Lyme - neg  CPK - 295    Radiology report:  < from: MR Lumbar Spine No Cont (08.01.22 @ 20:55) >  IMPRESSION:  1. Poorly marginated STIR and T2 hyperintensity involving the distal cord   and  conus could reflect cord edema versus myelomalacia/gliosis and   correlation with  clinical data is requested.  2. Broad-based posterior disc bulges are seen atL3-L4 and L4-L5 with the  central canal adequate at these and all remaining lumbar levels.  Right  foraminal disc protrusion at L4-L5 results in right foraminal narrowing   with  neural foramina adequate at remaining lumbar levels.    Final report: Correlation with thoracic spine obtained concurrently   demonstrates increased signal in the cord the conus which is nonspecific   but may represent infection inflammation or demyelination. Recommend   repeat thoracic spine with contrast.

## 2022-08-05 NOTE — PROGRESS NOTE ADULT - ASSESSMENT
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    LP under IR: CSF Protien 188, Glucose 61, Cells < 2, RBC 1083, PCR - negative, IgG index 0.7, Gr stain neg, Color- xanthochromia. Cytology - neg malignant cells.   Lyme, ACE, RPR, WNV pending    ESR/CRP/TSH - nl, rest of vasculitis - autoimmune panel -P    MRI thoraco-lumbar spine results reviewed with Dr. Chiang. increased signal intensity in the lower thoracic cord / conus with cord expansion, differential   diagnosis includes infectious inflammatory demyelinating, leptomeningeal ds cannot be excluded. MRI with contrast is needed.    # Evolving left lower extremity paresthesias + weakness + 1 bowel incontinence episode + saddle anesthesia; possibility of lower thoracic transverse myelitis / conus lesion vs demyelinating - inflammatory ds vs leptomeningal ds/ L/S plexopathy -RT related.    CSF reveals elevated protein, no cells + neg PCR's rule out infectious process, findings so far inconclusive, high protien can be seen in number of chronic conditions including autoimmune / demyelinating ds.  Xanthochromia still questionable.    # Possibility of peripheral neuropathy with left foot drop - evolved over last 2 years    - Pt will need MRI cervical/ thoracic and lumbar spine to determine extent and definitive etiological process, due to his body habitus, unable to fit in  MRI machine, MRI's at outside  facility unable to arrange so far.     - We will continue Solumedrol I GM daily x 5 days with GI prophylaxis for myelitis, then d/c home on oral prednisone taper  - F/U with pending results from CSF - Lyme, ACR, RPR and WNV, and with ID  - Pt will have MRI C/T/L spine and brain with kam; we are in the process of arranging it, if possible at [facility], Care co-ordinators, Dr. Gaspar are workinh on it. If not possible then as OP, on 8th/9th august.  - Pt will have EMG/NCV studies to rule out demyelinating disease/CIDP, with Dr. Reilly on 8/9/22 as OP    Patients medical condition, tests results and plan of care was D/W Taras Ordoñez, ([phone]) patients brother-in-law, at patients request. All questions were answered   to the best of my ability.    Above plan was D/W patient and Dr. Gaspar,     Dr. 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    LP under IR: CSF Protien 188, Glucose 61, Cells < 2, RBC 1083, PCR - negative, IgG index 0.7, Gr stain neg, Color- xanthochromia. Cytology - neg malignant cells.   Lyme, ACE, RPR, WNV pending    ESR/CRP/TSH - nl, rest of vasculitis - autoimmune panel -P    MRI thoraco-lumbar spine results reviewed with Dr. Chiang. increased signal intensity in the lower thoracic cord / conus with cord expansion, differential   diagnosis includes infectious inflammatory demyelinating, leptomeningeal ds cannot be excluded. MRI with contrast is needed.    # Evolving left lower extremity paresthesias + weakness + 1 bowel incontinence episode + saddle anesthesia; possibility of lower thoracic transverse myelitis / conus lesion vs demyelinating - inflammatory ds vs leptomeningal ds/ L/S plexopathy -RT related.    CSF reveals elevated protein, no cells + neg PCR's rule out infectious process, findings so far inconclusive, high protien can be seen in number of chronic conditions including autoimmune / demyelinating ds.  Xanthochromia still questionable.    # Possibility of peripheral neuropathy with left foot drop - evolved over last 2 years    - Pt will need MRI cervical/ thoracic and lumbar spine to determine extent and definitive etiological process, due to his body habitus, unable to fit in  MRI machine, MRI's at outside  facility unable to arrange so far.     - We will continue Solumedrol I GM daily x 5 days with GI prophylaxis for myelitis, then d/c home on oral prednisone taper  - F/U with pending results from CSF - Lyme, ACR, RPR and WNV, and with ID  - Pt will have MRI C/T/L spine and brain with kam; we are in the process of arranging it, if possible at [facility], Care co-ordinators, Dr. Gaspar are workinh on it. If not possible then as OP, on 8th/9th august.  - Pt will have EMG/NCV studies to rule out demyelinating disease/CIDP, with Dr. Reilly on 8/9/22 as OP    Patients medical condition, tests results and plan of care was D/W Taras Ordoñez, ([phone]) patients brother-in-law, at patients request. All questions were answered   to the best of my ability.    Above plan was D/W patient and Dr. Gaspar,     Dr. Cabello is on service from 8/6/22, she will f/u    More than 60 minutes of time spent in co-ordination of patient care

## 2022-08-06 ENCOUNTER — TRANSCRIPTION ENCOUNTER (OUTPATIENT)
Age: 47
End: 2022-08-06

## 2022-08-06 LAB
DSDNA AB SER-ACNC: <12 IU/ML — SIGNIFICANT CHANGE UP
INTERPRETATION SERPL IFE-IMP: SIGNIFICANT CHANGE UP

## 2022-08-06 PROCEDURE — 99232 SBSQ HOSP IP/OBS MODERATE 35: CPT

## 2022-08-06 RX ORDER — PREGABALIN 225 MG/1
1000 CAPSULE ORAL DAILY
Refills: 0 | Status: DISCONTINUED | OUTPATIENT
Start: 2022-08-06 | End: 2022-08-08

## 2022-08-06 RX ADMIN — Medication 86 MILLIGRAM(S): at 06:19

## 2022-08-06 RX ADMIN — PREGABALIN 1000 MICROGRAM(S): 225 CAPSULE ORAL at 11:18

## 2022-08-06 RX ADMIN — POLYETHYLENE GLYCOL 3350 17 GRAM(S): 17 POWDER, FOR SOLUTION ORAL at 19:53

## 2022-08-06 RX ADMIN — POLYETHYLENE GLYCOL 3350 17 GRAM(S): 17 POWDER, FOR SOLUTION ORAL at 09:02

## 2022-08-06 RX ADMIN — LISINOPRIL 20 MILLIGRAM(S): 2.5 TABLET ORAL at 09:02

## 2022-08-06 RX ADMIN — Medication 5 MILLIGRAM(S): at 13:23

## 2022-08-06 RX ADMIN — PANTOPRAZOLE SODIUM 40 MILLIGRAM(S): 20 TABLET, DELAYED RELEASE ORAL at 06:20

## 2022-08-06 RX ADMIN — Medication 1 PACKET(S): at 19:53

## 2022-08-06 RX ADMIN — Medication 25 MILLIGRAM(S): at 09:02

## 2022-08-06 NOTE — PROGRESS NOTE ADULT - ASSESSMENT
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      ESR/CRP/TSH - nl, rest of vasculitis - autoimmune panel -P    MRI thoraco-lumbar spine results reviewed with Dr. Chiang. increased signal intensity in the lower thoracic cord / conus with cord expansion, differential   diagnosis includes infectious inflammatory demyelinating, leptomeningeal ds cannot be excluded. MRI with contrast is needed.    # Evolving left lower extremity paresthesias + weakness + 1 bowel incontinence episode + saddle anesthesia; possibility of lower thoracic transverse myelitis / conus lesion vs demyelinating - inflammatory ds vs leptomeningal ds/ L/S plexopathy -RT related.  -Symptoms now are improving    CSF reveals elevated protein, no cells + neg PCR's rule out infectious process, findings so far inconclusive, high protein can be seen in number of chronic conditions including autoimmune / demyelinating ds.  Xanthochromia still questionable.    # Possibility of peripheral neuropathy with left foot drop - evolved over last 2 years      - Pt will need MRI cervical/ thoracic and lumbar spine to determine extent and definitive etiological process, due to his body habitus, unable to fit in  MRI machine.   - We will continue Solumedrol I GM daily x 5 days with GI prophylaxis for myelitis, then d/c home on oral prednisone taper  - F/U with pending results from CSF - Lyme, ACR, RPR and WNV, and with ID  -Will also check a copper level.  - Patient can get MRI C/T/L spine and brain with gadolinium as an outpatient. Attempts were made to arrange for transport to another facility with a larger MRI. However, this is not feasible at the current time.   - Pt will have EMG/NCV studies to rule out demyelinating disease/CIDP. He states that his appointment with Dr. Reilly on 8/9/22 was cancelled but he has another appointment scheduled.  States he had an EMG/NCV about 6 months ago at [facility]. These results can be obtained during regular hours for more information.  -Vitamin B12 level is in lower range of normal. Will add vitamin B12 1000 mcg/day.     Discussed with Dr. Gaspar

## 2022-08-06 NOTE — PROGRESS NOTE ADULT - SUBJECTIVE AND OBJECTIVE BOX
Interval History:  8/6/22: Patient reports constipation.  Notes improvement in leg swelling.   Slight improvement in right leg numbness. Left leg numbness is persistent.  He is able to walk to bathroom.  States he does have some numbness in groin.    MEDICATIONS  (STANDING):  enoxaparin Injectable 40 milliGRAM(s) SubCutaneous every 24 hours  hydrochlorothiazide 25 milliGRAM(s) Oral daily  lisinopril 20 milliGRAM(s) Oral daily  pantoprazole    Tablet 40 milliGRAM(s) Oral before breakfast  polyethylene glycol 3350 17 Gram(s) Oral two times a day  psyllium Powder 1 Packet(s) Oral every 12 hours    MEDICATIONS  (PRN):  acetaminophen     Tablet .. 650 milliGRAM(s) Oral every 6 hours PRN Temp greater or equal to 38C (100.4F), Mild Pain (1 - 3)  aluminum hydroxide/magnesium hydroxide/simethicone Suspension 30 milliLiter(s) Oral every 4 hours PRN Dyspepsia  melatonin 3 milliGRAM(s) Oral at bedtime PRN Insomnia  ondansetron Injectable 4 milliGRAM(s) IV Push every 8 hours PRN Nausea and/or Vomiting      Allergies    iodinated radiocontrast agents (Rash; Urticaria; Flushing; Hives)    Intolerances        PHYSICAL EXAM:  Vital Signs Last 24 Hrs  T(F): 98 (08-06-22 @ 08:56)  HR: 70 (08-06-22 @ 08:56)  BP: 145/85 (08-06-22 @ 08:56)  RR: 18 (08-06-22 @ 08:56)    Neurological exam:  HF: A x O x 3. Appropriately interactive, normal affect. Speech fluent, No Aphasia or paraphasic errors. Naming /repetition intact   CN: FRANCISCO, EOMI, VFF, facial sensation normal, no NLFD, tongue midline, Palate moves equally, SCM equal bilaterally  Motor: No pronator drift, Strength 5/5 in BUE and RLE, L HF 5-/5, KE 5/5, DF 0/5  Sens: loss of PP left L4/5-L5/S1 dermatomes  Reflexes: UE's 1+, Right patellar 2+, left patellar absent, absent ankle jerks, , downgoing toes b/l, negative clonus  Coord:  No FNFA  Gait: not tested              RADIOLOGY & ADDITIONAL STUDIES:    MRI thoracic and lumbar spine 8/1/22:  1. Poorly marginated STIR and T2 hyperintensity involving the distal cord   and  conus could reflect cord edema versus myelomalacia/gliosis and   correlation with  clinical data is requested.  2. Broad-based posterior disc bulges are seen atL3-L4 and L4-L5 with the  central canal adequate at these and all remaining lumbar levels.  Right  foraminal disc protrusion at L4-L5 results in right foraminal narrowing   with  neural foramina adequate at remaining lumbar levels.    Final report: Correlation with thoracic spine obtained concurrently   demonstrates increased signal in the cord the conus which is nonspecific   but may represent infection inflammation or demyelination. Recommend   repeat thoracic spine with contrast.      CSF studies: Protein 188, Glucose 61, Cells < 2, RBC 1083, PCR - negative, IgG index 0.7, Gr stain neg, Color- xanthochromia. Cytology - neg malignant cells.   Lyme, ACE, RPR, WNV pending      Vitamin B12 393

## 2022-08-06 NOTE — PROGRESS NOTE ADULT - SUBJECTIVE AND OBJECTIVE BOX
History of Present Illness:   47 year old obese man with a PMHx of colorectal cancer s/p resection and chemotherapy 4 years ago, peripheral neuropathy, LLE sarcoma s/p resection, chronic left foot drop and paresthesia  for more than 1yr, Htn,  kidney small malignant lesion,  presented to  ED  for evaluation of B/L leg paresthesias and weakness. Pt initially presented to  ED yesterday, neurosurgery had seen pt and recommended MRI T-spine, but pt was unable to fit into MRI machine so was transferred to [facility]. At [place] and after his MRI was completed, pt requested to be transferred back to .  Patient explains that sx began about one yr ago in his Left leg, with swelling ,  numbness and tingling. He notes that at that time, he was in the woods a lot, and just recently he had blood work done which came back positive for Lyme or Babesiosis?    For the past few weeks  he has noted swelling and progressive numbness in his Right leg as well and also he reports some saddle numbness for the past few days.  Pt admits to one episode of bowel incontinence this past Saturday. Left leg paresthesias still feel worse than the right. Paresthesias in both legs do not travel up past the knees. Admits difficulty ambulating d/t weakness in both legs, needs to use a cane. He states he's had worsening vision over the past few weeks.   He otherwise denies HA, speech disturbances, dizziness, lightheadedness, back/neck pain, no fever/chills, no rashes, no recent  insect bites       8.3: symptoms have not changed; still has paresthesia in Rt leg, no motor deficits in the Rt leg  Left foot drop chronic, +L foot paresthesia chronic   8.4: paresthesia improving, able to stand and walk with cane, Rt foot motor deficits improving   8.5: paresthesia continues to improve, no other new symptoms   8.6: reports mild saddle paresthesia, no incontinence, Rt foot motor function preserved, Rt foot improving sensation, Left foot drop (chronic)  +constipation     REVIEW OF SYSTEMS:    CONSTITUTIONAL: No weakness, No fevers or chills  ENT: No ear ache, No sorethroat  NECK: No pain, No stiffness  RESPIRATORY: No cough, No wheezing, No hemoptysis; No dyspnea  CARDIOVASCULAR: No chest pain, No palpitations  GASTROINTESTINAL: No abd pain, No nausea, No vomiting, No hematemesis, No diarrhea or constipation. No melena, No hematochezia.  GENITOURINARY: No dysuria, No  hematuria  NEUROLOGICAL: No diplopia, bilat leg/foot paresthesia, +L foot drop  MUSCULOSKELETAL: No arthralgia, No myalgia  SKIN: No rashes, or lesions   PSYCH: no anxiety, no suicidal ideation    All other review of systems is negative unless indicated above    Vital Signs Last 24 Hrs  T(C): 36.7 (06 Aug 2022 08:56), Max: 36.7 (06 Aug 2022 08:56)  T(F): 98 (06 Aug 2022 08:56), Max: 98 (06 Aug 2022 08:56)  HR: 70 (06 Aug 2022 08:56) (70 - 86)  BP: 145/85 (06 Aug 2022 08:56) (129/73 - 157/91)  RR: 18 (06 Aug 2022 08:56) (18 - 18)  SpO2: 96% (06 Aug 2022 08:56) (96% - 99%)    Parameters below as of 06 Aug 2022 08:56  Patient On (Oxygen Delivery Method): room air          PHYSICAL EXAM:    GENERAL: NAD  HEENT:  NC/AT, EOMI, PERRLA, No scleral icterus, Moist mucous membranes  NECK: Supple, No JVD  CNS:  Alert & Oriented X3, Motor Strength 5/5 B/L upper and lower extremities; L foot drop, Rt foot no motor deficits , reports bilateral paresthesias in the legs below the knee   LUNG: Normal Breath sounds, Clear to auscultation bilaterally, No rales, No rhonchi, No wheezing  HEART: RRR; No murmurs, No rubs  ABDOMEN: +BS, ST/ND/NT  GENITOURINARY: Voiding, Bladder not distended  EXTREMITIES:  2+ Peripheral Pulses, No clubbing, No cyanosis, No tibial edema  MUSCULOSKELTAL: Joints normal ROM, No TTP, No effusion  SKIN: no rashes  RECTAL: deferred, not indicated  BREAST: deferred    a/p:    1. RLE and saddle paresthesia:  r/o TM, r/o other demyelinating process  leptomeningeal spread unlikely : cytology negative in CSF  MRI lumbar and thoracic spine noted: +inflammation in conus medullaris   LP done: no evidence of infection, increased Proteins points toward an inflammatory process ; cytology negative in CSF....will start solumedrol IV 1gm daily x 5days  MRI lumbar spine with contrast ordered by neurology but unable to perform at  due to patients size; unable to arrange transfer to [facility] (MRI not available); d/w ER director at [facility].  -will need to get MRI as outpatient after discharge       2. Htn:   c/w ACEinh/HCTZ    3. H/o chemotherapy induced peripheral neuropathy; L foot drop    4. DVT prophy: hold Heparinoids due to presence of RBC in spinal fluid  ambulation     5. h/o Colon CA, h/o LLE sarcoma, ? h/o renal CA :  will call Hem/Onc evaluation with Dr Meyer/Suman who have access to outpatient records     6. Small renal Ca : scheduled for cryoablation at [facility]    7. Constipation:  MIralax, Dulcolax, Ambulation History of Present Illness:   47 year old obese man with a PMHx of colorectal cancer s/p resection and chemotherapy 4 years ago, peripheral neuropathy, LLE sarcoma s/p resection, chronic left foot drop and paresthesia  for more than 1yr, Htn,  kidney small malignant lesion,  presented to  ED  for evaluation of B/L leg paresthesias and weakness. Pt initially presented to  ED yesterday, neurosurgery had seen pt and recommended MRI T-spine, but pt was unable to fit into MRI machine so was transferred to [facility]. At [place] and after his MRI was completed, pt requested to be transferred back to .  Patient explains that sx began about one yr ago in his Left leg, with swelling ,  numbness and tingling. He notes that at that time, he was in the woods a lot, and just recently he had blood work done which came back positive for Lyme or Babesiosis?    For the past few weeks  he has noted swelling and progressive numbness in his Right leg as well and also he reports some saddle numbness for the past few days.  Pt admits to one episode of bowel incontinence this past Saturday. Left leg paresthesias still feel worse than the right. Paresthesias in both legs do not travel up past the knees. Admits difficulty ambulating d/t weakness in both legs, needs to use a cane. He states he's had worsening vision over the past few weeks.   He otherwise denies HA, speech disturbances, dizziness, lightheadedness, back/neck pain, no fever/chills, no rashes, no recent  insect bites       8.3: symptoms have not changed; still has paresthesia in Rt leg, no motor deficits in the Rt leg  Left foot drop chronic, +L foot paresthesia chronic   8.4: paresthesia improving, able to stand and walk with cane, Rt foot motor deficits improving   8.5: paresthesia continues to improve, no other new symptoms   8.6: reports mild saddle paresthesia, no incontinence, Rt foot motor function preserved, Rt foot improving sensation, Left foot drop (chronic)  +constipation     REVIEW OF SYSTEMS:    CONSTITUTIONAL: No weakness, No fevers or chills  ENT: No ear ache, No sorethroat  NECK: No pain, No stiffness  RESPIRATORY: No cough, No wheezing, No hemoptysis; No dyspnea  CARDIOVASCULAR: No chest pain, No palpitations  GASTROINTESTINAL: No abd pain, No nausea, No vomiting, No hematemesis, No diarrhea or constipation. No melena, No hematochezia.  GENITOURINARY: No dysuria, No  hematuria  NEUROLOGICAL: No diplopia, bilat leg/foot paresthesia, +L foot drop  MUSCULOSKELETAL: No arthralgia, No myalgia  SKIN: No rashes, or lesions   PSYCH: no anxiety, no suicidal ideation    All other review of systems is negative unless indicated above    Vital Signs Last 24 Hrs  T(C): 36.7 (06 Aug 2022 08:56), Max: 36.7 (06 Aug 2022 08:56)  T(F): 98 (06 Aug 2022 08:56), Max: 98 (06 Aug 2022 08:56)  HR: 70 (06 Aug 2022 08:56) (70 - 86)  BP: 145/85 (06 Aug 2022 08:56) (129/73 - 157/91)  RR: 18 (06 Aug 2022 08:56) (18 - 18)  SpO2: 96% (06 Aug 2022 08:56) (96% - 99%)    Parameters below as of 06 Aug 2022 08:56  Patient On (Oxygen Delivery Method): room air          PHYSICAL EXAM:    GENERAL: NAD  HEENT:  NC/AT, EOMI, PERRLA, No scleral icterus, Moist mucous membranes  NECK: Supple, No JVD  CNS:  Alert & Oriented X3, Motor Strength 5/5 B/L upper and lower extremities; L foot drop, Rt foot no motor deficits , reports bilateral paresthesias in the legs below the knee   LUNG: Normal Breath sounds, Clear to auscultation bilaterally, No rales, No rhonchi, No wheezing  HEART: RRR; No murmurs, No rubs  ABDOMEN: +BS, ST/ND/NT  GENITOURINARY: Voiding, Bladder not distended  EXTREMITIES:  2+ Peripheral Pulses, No clubbing, No cyanosis, No tibial edema  MUSCULOSKELTAL: Joints normal ROM, No TTP, No effusion  SKIN: no rashes  RECTAL: deferred, not indicated  BREAST: deferred    a/p:    1. Progressive RLE and saddle paresthesia:  r/o TM, r/o other demyelinating process  leptomeningeal spread unlikely : cytology negative in CSF  MRI lumbar and thoracic spine noted: +inflammation in conus medullaris   LP done: no evidence of infection, increased Proteins points toward an inflammatory process  cytology negative in CSF  Solumedrol IV 1gm daily x 5days, day#4 with improvement in paresthesia and motor function   MRI brain and entire  spine with contrast ordered by neurology but unable to perform at  due to patients size; unable to arrange transfer to [facility] (MRI not available); I have made multiple calls involving director of hospital medicine, ER director at  and at [facility] in an attempt to arrange for the MRI. At this time it is not feasible to shuttle patient to [facility] for this test.  -Although it might be helpful with diagnosis, this MRI is not emergent and will not change current management.  -will need to get MRI as outpatient after discharge   -plan to d/c home on Prednisone taper after Solumedrol course completed     2. Htn:   c/w ACEinh/HCTZ    3. H/o chemotherapy induced peripheral neuropathy; L foot drop    4. DVT prophy: hold Heparinoids due to presence of RBC in spinal fluid  ambulation     5. h/o Colon CA, h/o LLE sarcoma, ? h/o renal CA :  will call Hem/Onc evaluation with Dr Meyer/Suman who have access to outpatient records     6. Small renal Ca : scheduled for cryoablation at [facility]    7. Constipation:  MIralax, Dulcolax, Ambulation    d/w patient, mother, father, neurology, RN

## 2022-08-07 LAB
CULTURE RESULTS: SIGNIFICANT CHANGE UP
SARS-COV-2 RNA SPEC QL NAA+PROBE: SIGNIFICANT CHANGE UP
SPECIMEN SOURCE: SIGNIFICANT CHANGE UP

## 2022-08-07 PROCEDURE — 99232 SBSQ HOSP IP/OBS MODERATE 35: CPT

## 2022-08-07 RX ADMIN — PREGABALIN 1000 MICROGRAM(S): 225 CAPSULE ORAL at 09:44

## 2022-08-07 RX ADMIN — Medication 25 MILLIGRAM(S): at 09:44

## 2022-08-07 RX ADMIN — Medication 650 MILLIGRAM(S): at 10:20

## 2022-08-07 RX ADMIN — Medication 1 PACKET(S): at 09:45

## 2022-08-07 RX ADMIN — LISINOPRIL 20 MILLIGRAM(S): 2.5 TABLET ORAL at 09:43

## 2022-08-07 RX ADMIN — Medication 1 PACKET(S): at 20:24

## 2022-08-07 RX ADMIN — Medication 650 MILLIGRAM(S): at 09:49

## 2022-08-07 RX ADMIN — Medication 86 MILLIGRAM(S): at 09:44

## 2022-08-07 RX ADMIN — POLYETHYLENE GLYCOL 3350 17 GRAM(S): 17 POWDER, FOR SOLUTION ORAL at 20:24

## 2022-08-07 RX ADMIN — PANTOPRAZOLE SODIUM 40 MILLIGRAM(S): 20 TABLET, DELAYED RELEASE ORAL at 05:37

## 2022-08-07 RX ADMIN — POLYETHYLENE GLYCOL 3350 17 GRAM(S): 17 POWDER, FOR SOLUTION ORAL at 09:45

## 2022-08-07 NOTE — PROGRESS NOTE ADULT - SUBJECTIVE AND OBJECTIVE BOX
Interval History:  8/7/22: Notes some worsening of pain in left foot.    MEDICATIONS  (STANDING):  cyanocobalamin 1000 MICROGram(s) Oral daily  enoxaparin Injectable 40 milliGRAM(s) SubCutaneous every 24 hours  hydrochlorothiazide 25 milliGRAM(s) Oral daily  lisinopril 20 milliGRAM(s) Oral daily  pantoprazole    Tablet 40 milliGRAM(s) Oral before breakfast  polyethylene glycol 3350 17 Gram(s) Oral two times a day  psyllium Powder 1 Packet(s) Oral every 12 hours    MEDICATIONS  (PRN):  acetaminophen     Tablet .. 650 milliGRAM(s) Oral every 6 hours PRN Temp greater or equal to 38C (100.4F), Mild Pain (1 - 3)  aluminum hydroxide/magnesium hydroxide/simethicone Suspension 30 milliLiter(s) Oral every 4 hours PRN Dyspepsia  bisacodyl 5 milliGRAM(s) Oral every 12 hours PRN Constipation  melatonin 3 milliGRAM(s) Oral at bedtime PRN Insomnia  ondansetron Injectable 4 milliGRAM(s) IV Push every 8 hours PRN Nausea and/or Vomiting      Allergies    iodinated radiocontrast agents (Rash; Urticaria; Flushing; Hives)    Intolerances        PHYSICAL EXAM:  Vital Signs Last 24 Hrs  T(F): 97.3 (08-07-22 @ 09:07)  HR: 63 (08-07-22 @ 09:07)  BP: 143/94 (08-07-22 @ 09:07)  RR: 18 (08-07-22 @ 09:07)    HF: A x O x 3. Appropriately interactive, normal affect. Speech fluent, No Aphasia or paraphasic errors. Naming /repetition intact   CN: FRANCISCO, EOMI, VFF, facial sensation normal, no NLFD, tongue midline, Palate moves equally, SCM equal bilaterally  Motor: No pronator drift, Strength 5/5 in BUE and RLE, L HF 5-/5, KE 5/5, DF 0/5  Sens: loss of PP left L4/5-L5/S1 dermatomes  Reflexes: UE's 1+, patellars absent, absent ankle jerks, , downgoing toes b/l, negative clonus  Coord:  No FNFA  Gait: not tested            RADIOLOGY & ADDITIONAL STUDIES:    MRI thoracic and lumbar spine 8/1/22:  1. Poorly marginated STIR and T2 hyperintensity involving the distal cord   and  conus could reflect cord edema versus myelomalacia/gliosis and   correlation with  clinical data is requested.  2. Broad-based posterior disc bulges are seen atL3-L4 and L4-L5 with the  central canal adequate at these and all remaining lumbar levels.  Right  foraminal disc protrusion at L4-L5 results in right foraminal narrowing   with  neural foramina adequate at remaining lumbar levels.    Final report: Correlation with thoracic spine obtained concurrently   demonstrates increased signal in the cord the conus which is nonspecific   but may represent infection inflammation or demyelination. Recommend   repeat thoracic spine with contrast.      CSF studies: Protein 188, Glucose 61, Cells < 2, RBC 1083, PCR - negative, IgG index 0.7, Gr stain neg, Color- xanthochromia. Cytology - neg malignant cells.   Lyme, ACE, RPR, WNV pending      Vitamin B12 393

## 2022-08-07 NOTE — PROGRESS NOTE ADULT - SUBJECTIVE AND OBJECTIVE BOX
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 Pt initially presented to  ED yesterday, neurosurgery had seen pt and recommended MRI T-spine, but pt was unable to fit into MRI machine so was transferred to [facility]. At [facility] and after his MRI was completed, pt requested to be transferred back to .  Patient explains that sx began about one yr ago in his Left leg, with swelling ,  numbness and tingling. He notes that at that time, he was in the woods a lot, and just recently he had blood work done which came back positive for Lyme or Babesiosis?    For the past few weeks  he has noted swelling and progressive numbness in his Right leg as well and also he reports some saddle numbness for the past few days.  Pt admits to one episode of bowel incontinence this past Saturday. Left leg paresthesias still feel worse than the right. Paresthesias in both legs do not travel up past the knees. Admits difficulty ambulating d/t weakness in both legs, needs to use a cane. He states he's had worsening vision over the past few weeks.   He otherwise denies HA, speech disturbances, dizziness, lightheadedness, back/neck pain, no fever/chills, no rashes, no recent  insect bites       8.3: symptoms have not changed; still has paresthesia in Rt leg, no motor deficits in the Rt leg  Left foot drop chronic, +L foot paresthesia chronic   8.4: paresthesia improving, able to stand and walk with cane, Rt foot motor deficits improving   8.5: paresthesia continues to improve, no other new symptoms   8.6: reports mild saddle paresthesia, no incontinence, Rt foot motor function preserved, Rt foot improving sensation, Left foot drop (chronic)  +constipation   8.7: reports a set back in his symptoms; more paresthesia today in his Rt foot; able to ambulate, persistent paresthesia of his perineum but no incontinence reported; constipation resolving, +BM    REVIEW OF SYSTEMS:    CONSTITUTIONAL: No weakness, No fevers or chills  ENT: No ear ache, No sorethroat  NECK: No pain, No stiffness  RESPIRATORY: No cough, No wheezing, No hemoptysis; No dyspnea  CARDIOVASCULAR: No chest pain, No palpitations  GASTROINTESTINAL: No abd pain, No nausea, No vomiting, No hematemesis, No diarrhea or constipation. No melena, No hematochezia.  GENITOURINARY: No dysuria, No  hematuria  NEUROLOGICAL: No diplopia, bilat leg/foot paresthesia, +L foot drop  MUSCULOSKELETAL: No arthralgia, No myalgia  SKIN: No rashes, or lesions   PSYCH: no anxiety, no suicidal ideation    All other review of systems is negative unless indicated above    Vital Signs Last 24 Hrs  T(C): 36.7 (06 Aug 2022 08:56), Max: 36.7 (06 Aug 2022 08:56)  T(F): 98 (06 Aug 2022 08:56), Max: 98 (06 Aug 2022 08:56)  HR: 70 (06 Aug 2022 08:56) (70 - 86)  BP: 145/85 (06 Aug 2022 08:56) (129/73 - 157/91)  RR: 18 (06 Aug 2022 08:56) (18 - 18)  SpO2: 96% (06 Aug 2022 08:56) (96% - 99%)    Parameters below as of 06 Aug 2022 08:56  Patient On (Oxygen Delivery Method): room air          PHYSICAL EXAM:    GENERAL: NAD  HEENT:  NC/AT, EOMI, PERRLA, No scleral icterus, Moist mucous membranes  NECK: Supple, No JVD  CNS:  Alert & Oriented X3, Motor Strength 5/5 B/L upper and lower extremities; L foot drop, Rt foot no motor deficits , reports bilateral paresthesias in the legs below the knee   LUNG: Normal Breath sounds, Clear to auscultation bilaterally, No rales, No rhonchi, No wheezing  HEART: RRR; No murmurs, No rubs  ABDOMEN: +BS, ST/ND/NT  GENITOURINARY: Voiding, Bladder not distended  EXTREMITIES:  2+ Peripheral Pulses, No clubbing, No cyanosis, No tibial edema  MUSCULOSKELTAL: Joints normal ROM, No TTP, No effusion, L leg has old scar from previous resected sarcoma   SKIN: no rashes  RECTAL: deferred, not indicated  BREAST: deferred    a/p:    1. Progressive RLE and saddle paresthesia:  -r/o TM vs other type of demyelinating process  -leptomeningeal spread unlikely : cytology negative in CSF  -MRI lumbar and thoracic spine w/o contrast noted: +inflammation in conus medullaris   -LP done: no evidence of infection, increased Proteins points toward an inflammatory process; cytology negative in CSF  -Solumedrol IV 1gm daily x 5days, day#5 with improvement in paresthesia and motor function initially ; yesterday jr reports a set back     MRI brain and entire  spine with contrast ordered by neurology but unable to perform at  due to patients size; unable to arrange transfer to [facility] (MRI not available); I have made multiple calls involving director of [facility] medicine, ER director at  and at [facility], Transfer Center in an attempt to arrange for the MRI. At this time it is not feasible to shuttle patient to [facility] for this test.  -Although it might shed more light on the diagnosis, this MRI is not emergent and will not change current management.  -will need to get MRI as outpatient after discharge   -will start Prednisone 40mg daily tomorrow; will need long outpatient taper ( 5mg a week? )  Patient reluctant to go home today; although symptoms have improved from admission he had a set back yesterday   -re evaluate in AM    2. Htn:   c/w ACEinh/HCTZ    3. H/o chemotherapy induced peripheral neuropathy; L foot drop    4. DVT prophy: hold Heparinoids due to presence of RBC in spinal fluid  ambulation     5. h/o Colon CA, h/o LLE sarcoma, ? h/o renal CA :  will call Hem/Onc evaluation with Dr Meyer/Suman who have access to outpatient records     6. Small renal Ca : scheduled for cryoablation at [facility]    7. 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 Pt initially presented to  ED yesterday, neurosurgery had seen pt and recommended MRI T-spine, but pt was unable to fit into MRI machine so was transferred to [facility]. At [place] and after his MRI was completed, pt requested to be transferred back to .  Patient explains that sx began about one yr ago in his Left leg, with swelling ,  numbness and tingling. He notes that at that time, he was in the woods a lot, and just recently he had blood work done which came back positive for Lyme or Babesiosis?    For the past few weeks  he has noted swelling and progressive numbness in his Right leg as well and also he reports some saddle numbness for the past few days.  Pt admits to one episode of bowel incontinence this past Saturday. Left leg paresthesias still feel worse than the right. Paresthesias in both legs do not travel up past the knees. Admits difficulty ambulating d/t weakness in both legs, needs to use a cane. He states he's had worsening vision over the past few weeks.   He otherwise denies HA, speech disturbances, dizziness, lightheadedness, back/neck pain, no fever/chills, no rashes, no recent  insect bites       8.3: symptoms have not changed; still has paresthesia in Rt leg, no motor deficits in the Rt leg  Left foot drop chronic, +L foot paresthesia chronic   8.4: paresthesia improving, able to stand and walk with cane, Rt foot motor deficits improving   8.5: paresthesia continues to improve, no other new symptoms   8.6: reports mild saddle paresthesia, no incontinence, Rt foot motor function preserved, Rt foot improving sensation, Left foot drop (chronic)  +constipation   8.7: reports a set back in his symptoms; more paresthesia today in his Rt foot; able to ambulate, persistent paresthesia of his perineum but no incontinence reported; constipation resolving, +BM    REVIEW OF SYSTEMS:    CONSTITUTIONAL: No weakness, No fevers or chills  ENT: No ear ache, No sorethroat  NECK: No pain, No stiffness  RESPIRATORY: No cough, No wheezing, No hemoptysis; No dyspnea  CARDIOVASCULAR: No chest pain, No palpitations  GASTROINTESTINAL: No abd pain, No nausea, No vomiting, No hematemesis, No diarrhea or constipation. No melena, No hematochezia.  GENITOURINARY: No dysuria, No  hematuria  NEUROLOGICAL: No diplopia, bilat leg/foot paresthesia, +L foot drop  MUSCULOSKELETAL: No arthralgia, No myalgia  SKIN: No rashes, or lesions   PSYCH: no anxiety, no suicidal ideation    All other review of systems is negative unless indicated above    Vital Signs Last 24 Hrs  T(C): 36.7 (06 Aug 2022 08:56), Max: 36.7 (06 Aug 2022 08:56)  T(F): 98 (06 Aug 2022 08:56), Max: 98 (06 Aug 2022 08:56)  HR: 70 (06 Aug 2022 08:56) (70 - 86)  BP: 145/85 (06 Aug 2022 08:56) (129/73 - 157/91)  RR: 18 (06 Aug 2022 08:56) (18 - 18)  SpO2: 96% (06 Aug 2022 08:56) (96% - 99%)    Parameters below as of 06 Aug 2022 08:56  Patient On (Oxygen Delivery Method): room air          PHYSICAL EXAM:    GENERAL: NAD  HEENT:  NC/AT, EOMI, PERRLA, No scleral icterus, Moist mucous membranes  NECK: Supple, No JVD  CNS:  Alert & Oriented X3, Motor Strength 5/5 B/L upper and lower extremities; L foot drop, Rt foot no motor deficits , reports bilateral paresthesias in the legs below the knee   LUNG: Normal Breath sounds, Clear to auscultation bilaterally, No rales, No rhonchi, No wheezing  HEART: RRR; No murmurs, No rubs  ABDOMEN: +BS, ST/ND/NT  GENITOURINARY: Voiding, Bladder not distended  EXTREMITIES:  2+ Peripheral Pulses, No clubbing, No cyanosis, No tibial edema  MUSCULOSKELTAL: Joints normal ROM, No TTP, No effusion, L leg has old scar from previous resected sarcoma   SKIN: no rashes  RECTAL: deferred, not indicated  BREAST: deferred    a/p:    1. Progressive RLE and saddle paresthesia:  -r/o TM vs other type of demyelinating process  -leptomeningeal spread unlikely : cytology negative in CSF  -MRI lumbar and thoracic spine w/o contrast noted: +inflammation in conus medullaris   -LP done: no evidence of infection, increased Proteins points toward an inflammatory process; cytology negative in CSF  f/u Lyme CSF, f/u WNV PCR  -Solumedrol IV 1gm daily x 5days, day#5 with improvement in paresthesia and motor function initially ; yesterday jr reports a set back     MRI brain and entire  spine with contrast ordered by neurology but unable to perform at  due to patients size; unable to arrange transfer to [facility] (MRI not available); I have made multiple calls involving director of hospital medicine, ER director at  and at [facility], Transfer Center in an attempt to arrange for the MRI. At this time it is not feasible to shuttle patient to [facility] for this test.  -Although it might shed more light on the diagnosis, this MRI is not emergent and will not change current management.  -will need to get MRI as outpatient after discharge   -will start Prednisone 40mg daily tomorrow; will need long outpatient taper ( 5mg a week? ); outpatient f/u with neurology for EMG studies  Patient reluctant to go home today; although symptoms have improved from admission he had a set back yesterday   -replete Vit B12, B6  -re evaluate in AM    2. Htn:   c/w ACEinh/HCTZ    3. H/o chemotherapy induced peripheral neuropathy; L foot drop    4. DVT prophy: hold Heparinoids due to presence of RBC in spinal fluid  ambulation     5. h/o Colon CA, h/o LLE sarcoma, ? h/o renal CA :  will call Hem/Onc evaluation with Dr Meyer/Suman who have access to outpatient records     6. Small renal Ca : scheduled for cryoablation at [facility]    7. Constipation:  MIralax, Dulcolax, Ambulation    d/w patient, mother, father, neurology, RN

## 2022-08-07 NOTE — CHART NOTE - NSCHARTNOTEFT_GEN_A_CORE
Patient brother in law Taras Ordoñez wants information regarding patient infection and does not want patient to be discharged; I spoke to patient in presence of his parents and once again reviewed all the infectious disease tests and that all the available tests are negative for infection as was explained to him on my last encounter with the patient. I explained that I will not speak to any other family members out of respect to the [facility] Infectious Disease consultation service that requires my attention at this time and that he has full capacity as do his parents to convey this information to his brother in law.. Any decisions regarding discharge is not under my control. They understand and all questions answered.

## 2022-08-07 NOTE — PROGRESS NOTE ADULT - ASSESSMENT
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      ESR/CRP/TSH - nl, rest of vasculitis - autoimmune panel -P    MRI thoraco-lumbar spine results reviewed with Dr. Chiang. increased signal intensity in the lower thoracic cord / conus with cord expansion, differential   diagnosis includes infectious inflammatory demyelinating, leptomeningeal ds cannot be excluded. MRI with contrast is needed.    # Evolving left lower extremity paresthesias + weakness + 1 bowel incontinence episode + saddle anesthesia; possibility of lower thoracic transverse myelitis / conus lesion vs demyelinating - inflammatory ds vs leptomeningal ds/ L/S plexopathy -RT related.  -Symptoms now are improving    CSF reveals elevated protein, no cells + neg PCR's rule out infectious process, findings so far inconclusive, high protein can be seen in number of chronic conditions including autoimmune / demyelinating ds.  Xanthochromia still questionable.    # Possibility of peripheral neuropathy with left foot drop - evolved over last 2 years    - MRI cervical/ thoracic and lumbar spine with contrast  to determine extent and definitive etiological process, due to his body habitus, unable to fit in  MRI machine. Attempts were made to arrange for transport to another facility with a larger MRI. However, this is not feasible at the current time.   Patient is medically stable and can have this done as outpatient.   - We will continue Solumedrol I GM daily x 5 days with GI prophylaxis for myelitis, then d/c home on oral prednisone taper  - F/U with pending results from CSF - Lyme, ACR, RPR and WNV, and with ID   - Pt will have EMG/NCV studies to rule out demyelinating disease/CIDP. He states that his appointment with Dr. Reilly on 8/9/22 was cancelled but he has another appointment scheduled.  States he had an EMG/NCV about 6 months ago at [facility]. These results can be obtained during regular hours for more information.  -Vitamin B12 1000 mcg/day added.  -f/u copper level    Discussed with patient, all questions answered.  Discussed with Dr. Gaspar

## 2022-08-08 ENCOUNTER — TRANSCRIPTION ENCOUNTER (OUTPATIENT)
Age: 47
End: 2022-08-08

## 2022-08-08 VITALS
TEMPERATURE: 98 F | OXYGEN SATURATION: 94 % | RESPIRATION RATE: 18 BRPM | DIASTOLIC BLOOD PRESSURE: 83 MMHG | SYSTOLIC BLOOD PRESSURE: 130 MMHG | HEART RATE: 85 BPM

## 2022-08-08 LAB
A PHAGOCYTOPH DNA BLD QL NAA+PROBE: NEGATIVE — SIGNIFICANT CHANGE UP
ANA TITR SER: NEGATIVE — SIGNIFICANT CHANGE UP
B BURGDOR DNA SPEC QL NAA+PROBE: NEGATIVE — SIGNIFICANT CHANGE UP
CULTURE RESULTS: SIGNIFICANT CHANGE UP
E CHAFFEENSIS DNA BLD QL NAA+PROBE: NEGATIVE — SIGNIFICANT CHANGE UP
E EWINGII DNA SPEC QL NAA+PROBE: NEGATIVE — SIGNIFICANT CHANGE UP
EHRLICHIA DNA SPEC QL NAA+PROBE: NEGATIVE — SIGNIFICANT CHANGE UP
MPO AB + PR3 PNL SER: SIGNIFICANT CHANGE UP
SPECIMEN SOURCE: SIGNIFICANT CHANGE UP
WNV IGG TITR FLD: NEGATIVE — SIGNIFICANT CHANGE UP
WNV IGM SPEC QL: NEGATIVE — SIGNIFICANT CHANGE UP

## 2022-08-08 PROCEDURE — 99232 SBSQ HOSP IP/OBS MODERATE 35: CPT

## 2022-08-08 PROCEDURE — 99239 HOSP IP/OBS DSCHRG MGMT >30: CPT

## 2022-08-08 RX ORDER — PANTOPRAZOLE SODIUM 20 MG/1
1 TABLET, DELAYED RELEASE ORAL
Qty: 30 | Refills: 0
Start: 2022-08-08 | End: 2022-09-06

## 2022-08-08 RX ORDER — PREGABALIN 225 MG/1
1 CAPSULE ORAL
Qty: 0 | Refills: 0 | DISCHARGE
Start: 2022-08-08

## 2022-08-08 RX ADMIN — PANTOPRAZOLE SODIUM 40 MILLIGRAM(S): 20 TABLET, DELAYED RELEASE ORAL at 09:49

## 2022-08-08 RX ADMIN — PREGABALIN 1000 MICROGRAM(S): 225 CAPSULE ORAL at 09:52

## 2022-08-08 RX ADMIN — POLYETHYLENE GLYCOL 3350 17 GRAM(S): 17 POWDER, FOR SOLUTION ORAL at 09:50

## 2022-08-08 RX ADMIN — Medication 40 MILLIGRAM(S): at 12:28

## 2022-08-08 RX ADMIN — Medication 1 PACKET(S): at 09:51

## 2022-08-08 RX ADMIN — LISINOPRIL 20 MILLIGRAM(S): 2.5 TABLET ORAL at 09:50

## 2022-08-08 RX ADMIN — Medication 25 MILLIGRAM(S): at 09:52

## 2022-08-08 NOTE — DISCHARGE NOTE PROVIDER - ATTENDING DISCHARGE PHYSICAL EXAMINATION:
GENERAL: NAD  HEENT:  NC/AT, EOMI, PERRLA, No scleral icterus, Moist mucous membranes  NECK: Supple, No JVD  CNS:  Alert & Oriented X3, Motor Strength 5/5 B/L upper and lower extremities; L foot drop, Rt foot no motor deficits , reports bilateral paresthesias in the legs below the knee   LUNG: Normal Breath sounds, Clear to auscultation bilaterally, No rales, No rhonchi, No wheezing  HEART: RRR; No murmurs, No rubs  ABDOMEN: +BS, ST/ND/NT  GENITOURINARY: Voiding, Bladder not distended  EXTREMITIES:  2+ Peripheral Pulses, No clubbing, No cyanosis, No tibial edema  MUSCULOSKELTAL: Joints normal ROM, No TTP, No effusion, L leg has old scar from previous resected sarcoma

## 2022-08-08 NOTE — PROGRESS NOTE ADULT - ASSESSMENT
47 year old obese man with a PMHx of HTN, rectal cancer s/p res/chemo/RT 4 years ago,   S/P sarcoma resection from LLE - peripheral neuropathy and lumbar DJD, chronic left foot drop that evolved x past year, was recently diagnosed with renal CA, has had paresthesias left leg x a year, has been seen by number of specialists, lumbar surgery was being contemplated, he presented to  ED on 8/2 with complaint of worsening RLE paresthesias/weakness x 3 weeks and saddle anesthesia x 4 days, had an episode of bowel incontinence 4 days prior to admission.    The patient tells me that he has had the left foot drop for about 9 months and has been at home all year feeling "as if I am becoming paralyzed".      ESR/CRP/TSH - nl, rest of vasculitis - autoimmune panel -P    MRI thoraco-lumbar spine results reviewed with Dr. Chiang. increased signal intensity in the lower thoracic cord / conus with cord expansion, differential   diagnosis includes infectious inflammatory demyelinating, leptomeningeal ds cannot be excluded. MRI with contrast is needed.    # Evolving left lower extremity paresthesias + weakness + 1 bowel incontinence episode + saddle anesthesia; possibility of lower thoracic transverse myelitis / conus lesion vs demyelinating - inflammatory ds vs leptomeningal ds/ L/S plexopathy -RT related.  -Symptoms now are improving    CSF reveals elevated protein, no cells + neg PCR's rule out infectious process, findings so far inconclusive, high protein can be seen in number of chronic conditions including autoimmune / demyelinating ds.  Xanthochromia still questionable.    # Possibility of peripheral neuropathy with left foot drop - evolved over last 2 years    - MRI cervical/ thoracic and lumbar spine with contrast  to determine extent and definitive etiological process, due to his body habitus, unable to fit in  MRI machine. Attempts were made to arrange for transport to another facility with a larger MRI. However, this is not feasible at the current time.   Patient is medically stable and can have this done as outpatient. He has made an appointment for MRI tomorrow, 8/9 at [facility].   - He has completed  Solumedrol I GM daily x 5 days with GI prophylaxis for myelitis, then d/c home on oral prednisone taper  - F/U with pending results from CSF - Lyme, ACR, RPR and WNV, and with ID   - Pt will have EMG/NCV studies to rule out demyelinating disease/CIDP. He states that his appointment with Dr. Reilly on 8/9/22 was cancelled but he has another appointment scheduled.  States he had an EMG/NCV about 6 months ago at [facility]. These results can be obtained during regular hours for more information.  -Vitamin B12 1000 mcg/day added.  -f/u copper level    Discussed with patient, all questions answered.  Will arrange for outpatient follow-up.  Discussed with SAI Peterson

## 2022-08-08 NOTE — DISCHARGE NOTE PROVIDER - CARE PROVIDER_API CALL
Nahed Wiseman)  Neurology  [address], [address]  [place] [address]  Phone: [phone]  Fax: [phone]  Follow Up Time:     GUANACO TAY  [facility]  [address] [place] [address]  Phone: [phone]  Fax: [phone]  Follow Up Time:

## 2022-08-08 NOTE — DISCHARGE NOTE PROVIDER - NSDCCAREPROVSEEN_GEN_ALL_CORE_FT
Bhupinder, Cynthia Cabello, Debbie Little, Geovanna Gaspar, Girma De Los Santos, Lidia Murphy, Jesse Martinez, Annabelle Shaffer, Archana Retana, Dwayne Gan, Vivienne Rodrigues, Terrie Abbott

## 2022-08-08 NOTE — DISCHARGE NOTE PROVIDER - NSDCFUSCHEDAPPT_GEN_ALL_CORE_FT
[facility]  MRI  [place]  Scheduled Appointment: 08/09/2022    [facility]  MRI  [place]  Scheduled Appointment: 08/09/2022    [facility]  MRI  [place]  Scheduled Appointment: 08/09/2022    Adam Marks  [facility]  NEUROSURG [address]  Scheduled Appointment: 08/15/2022    Andrea Gonzales  [facility]  NEUROLOGY [address]  Scheduled Appointment: 11/01/2022     [facility]  MRI  [place]  Scheduled Appointment: 08/09/2022    [facility]  MRI  [place]  Scheduled Appointment: 08/09/2022    [facility]  MRI  [place]  Scheduled Appointment: 08/09/2022    Nahed Wiseman  [facility]  NEUROLOGY [address]  Scheduled Appointment: 08/10/2022    Adam Marks  [facility]  NEUROSURG [address]  Scheduled Appointment: 08/15/2022    Andrea Gonzales  [facility]  NEUROLOGY [address]  Scheduled Appointment: 11/01/2022

## 2022-08-08 NOTE — DISCHARGE NOTE NURSING/CASE MANAGEMENT/SOCIAL WORK - PATIENT PORTAL LINK FT
You can access the FollowMyHealth Patient Portal offered by [facility] by registering at the following website: [email or web address]. By joining [employer]’s FollowMyHealth portal, you will also be able to view your health information using other applications (apps) compatible with our system.

## 2022-08-08 NOTE — PROGRESS NOTE ADULT - PROVIDER SPECIALTY LIST ADULT
Hospitalist
Neurology
Hospitalist
Infectious Disease
Neurology
Neurosurgery

## 2022-08-08 NOTE — DISCHARGE NOTE PROVIDER - NSDCCPCAREPLAN_GEN_ALL_CORE_FT
PRINCIPAL DISCHARGE DIAGNOSIS  Diagnosis: Myelitis  Assessment and Plan of Treatment: you will need to f/u with neurology as an outpaitent  - you will be on prednisone 40mg once daily- with 5mg taper every week.  ensure that you go to your MRI appoinment.  f/u with your PCP As an outpatient  notify your PCP for worsening symptoms.   -f/u with Dr Reilly for EMG/NCV

## 2022-08-08 NOTE — DISCHARGE NOTE PROVIDER - NSDCMRMEDTOKEN_GEN_ALL_CORE_FT
cyanocobalamin 1000 mcg oral tablet: 1 tab(s) orally once a day  lisinopril-hydrochlorothiazide 20 mg-25 mg oral tablet: 1 tab(s) orally once a day  Metamucil 3.4 g/3.7 g oral powder for reconstitution: 3 scoop(s) orally 2 times a day  MiraLax oral powder for reconstitution: 34 gram(s) orally 2 times a day  pantoprazole 40 mg oral delayed release tablet: 1 tab(s) orally once a day (after a meal)   predniSONE 5 mg oral tablet: 8 tab(s) oral QD x 7 days  7 tab(s) oral QD x 7 days  6 tab(s) oral QD x 7 days  5 tab(s) oral QD x 7 days  4 tab(s) oral QD x 7 days  3 tab(s) oral QD x 7 days  2 tab(s) oral QD x 7 days  1 tab(s) oral QD x 7 days

## 2022-08-08 NOTE — DISCHARGE NOTE NURSING/CASE MANAGEMENT/SOCIAL WORK - NSDCPEFALRISK_GEN_ALL_CORE
For information on Fall & Injury Prevention, visit: [email or web address] OR  [email or web address] OR  https://www.cdc.gov/steadi/patient.html

## 2022-08-08 NOTE — PROGRESS NOTE ADULT - SUBJECTIVE AND OBJECTIVE BOX
Interval History:  8/8/22: Reports some tightness in left ankle/foot.    MEDICATIONS  (STANDING):  cyanocobalamin 1000 MICROGram(s) Oral daily  enoxaparin Injectable 40 milliGRAM(s) SubCutaneous every 24 hours  hydrochlorothiazide 25 milliGRAM(s) Oral daily  lisinopril 20 milliGRAM(s) Oral daily  pantoprazole    Tablet 40 milliGRAM(s) Oral before breakfast  polyethylene glycol 3350 17 Gram(s) Oral two times a day  psyllium Powder 1 Packet(s) Oral every 12 hours    MEDICATIONS  (PRN):  acetaminophen     Tablet .. 650 milliGRAM(s) Oral every 6 hours PRN Temp greater or equal to 38C (100.4F), Mild Pain (1 - 3)  aluminum hydroxide/magnesium hydroxide/simethicone Suspension 30 milliLiter(s) Oral every 4 hours PRN Dyspepsia  bisacodyl 5 milliGRAM(s) Oral every 12 hours PRN Constipation  melatonin 3 milliGRAM(s) Oral at bedtime PRN Insomnia  ondansetron Injectable 4 milliGRAM(s) IV Push every 8 hours PRN Nausea and/or Vomiting      Allergies    iodinated radiocontrast agents (Rash; Urticaria; Flushing; Hives)    Intolerances        PHYSICAL EXAM:  Vital Signs Last 24 Hrs  T(F): 97.7 (08-08-22 @ 07:25)  HR: 72 (08-08-22 @ 07:25)  BP: 150/84 (08-08-22 @ 07:25)  RR: 18 (08-08-22 @ 07:25)      HF: A x O x 3. Appropriately interactive, normal affect. Speech fluent, No Aphasia or paraphasic errors. Naming /repetition intact   CN: FRANCISCO, EOMI, VFF, facial sensation normal, no NLFD, tongue midline, Palate moves equally, SCM equal bilaterally  Motor: No pronator drift, Strength 5/5 in BUE and RLE, L HF 5-/5, KE 5/5, DF 0/5, PF 0/5  Sens: loss of PP left L4/5-L5/S1 dermatomes  Reflexes: UE's 1+, 1+ patellar on right, absent on left patellar absent ankle jerks, , downgoing toes b/l, negative clonus  Coord:  No FNFA  Gait: not tested                  RADIOLOGY & ADDITIONAL STUDIES:      MRI thoracic and lumbar spine 8/1/22:  1. Poorly marginated STIR and T2 hyperintensity involving the distal cord   and  conus could reflect cord edema versus myelomalacia/gliosis and   correlation with  clinical data is requested.  2. Broad-based posterior disc bulges are seen atL3-L4 and L4-L5 with the  central canal adequate at these and all remaining lumbar levels.  Right  foraminal disc protrusion at L4-L5 results in right foraminal narrowing   with  neural foramina adequate at remaining lumbar levels.    Final report: Correlation with thoracic spine obtained concurrently   demonstrates increased signal in the cord the conus which is nonspecific   but may represent infection inflammation or demyelination. Recommend   repeat thoracic spine with contrast.      CSF studies: Protein 188, Glucose 61, Cells < 2, RBC 1083, PCR - negative, IgG index 0.7, Gr stain neg, Color- xanthochromia. Cytology - neg malignant cells.   Lyme, ACE, RPR, WNV pending      Vitamin B12 393

## 2022-08-08 NOTE — DISCHARGE NOTE NURSING/CASE MANAGEMENT/SOCIAL WORK - NSDCPNINST_GEN_ALL_CORE
Return to ER or call MD if you experience worsening in weakness or numbness or inability to work. Continue medications as prescribed and follow up with doctors as instructed.

## 2022-08-09 ENCOUNTER — OUTPATIENT (OUTPATIENT)
Dept: OUTPATIENT SERVICES | Facility: HOSPITAL | Age: 47
LOS: 1 days | End: 2022-08-09
Payer: COMMERCIAL

## 2022-08-09 ENCOUNTER — APPOINTMENT (OUTPATIENT)
Dept: MRI IMAGING | Facility: CLINIC | Age: 47
End: 2022-08-09

## 2022-08-09 DIAGNOSIS — Z98.890 OTHER SPECIFIED POSTPROCEDURAL STATES: Chronic | ICD-10-CM

## 2022-08-09 DIAGNOSIS — Z90.89 ACQUIRED ABSENCE OF OTHER ORGANS: Chronic | ICD-10-CM

## 2022-08-09 DIAGNOSIS — Z90.49 ACQUIRED ABSENCE OF OTHER SPECIFIED PARTS OF DIGESTIVE TRACT: Chronic | ICD-10-CM

## 2022-08-09 DIAGNOSIS — Z00.8 ENCOUNTER FOR OTHER GENERAL EXAMINATION: ICD-10-CM

## 2022-08-09 DIAGNOSIS — Z41.9 ENCOUNTER FOR PROCEDURE FOR PURPOSES OTHER THAN REMEDYING HEALTH STATE, UNSPECIFIED: Chronic | ICD-10-CM

## 2022-08-09 DIAGNOSIS — R93.7 ABNORMAL FINDINGS ON DIAGNOSTIC IMAGING OF OTHER PARTS OF MUSCULOSKELETAL SYSTEM: ICD-10-CM

## 2022-08-09 PROCEDURE — 72156 MRI NECK SPINE W/O & W/DYE: CPT | Mod: 26

## 2022-08-09 PROCEDURE — 72157 MRI CHEST SPINE W/O & W/DYE: CPT | Mod: 26

## 2022-08-09 PROCEDURE — 72157 MRI CHEST SPINE W/O & W/DYE: CPT

## 2022-08-09 PROCEDURE — 72158 MRI LUMBAR SPINE W/O & W/DYE: CPT | Mod: 26

## 2022-08-09 PROCEDURE — 72156 MRI NECK SPINE W/O & W/DYE: CPT

## 2022-08-09 PROCEDURE — 72158 MRI LUMBAR SPINE W/O & W/DYE: CPT

## 2022-08-09 PROCEDURE — A9585: CPT

## 2022-08-10 ENCOUNTER — APPOINTMENT (OUTPATIENT)
Dept: NEUROLOGY | Facility: CLINIC | Age: 47
End: 2022-08-10

## 2022-08-10 VITALS
SYSTOLIC BLOOD PRESSURE: 161 MMHG | HEIGHT: 75 IN | HEART RATE: 81 BPM | TEMPERATURE: 97.8 F | BODY MASS INDEX: 38.54 KG/M2 | WEIGHT: 310 LBS | DIASTOLIC BLOOD PRESSURE: 98 MMHG

## 2022-08-10 DIAGNOSIS — R90.89 OTHER ABNORMAL FINDINGS ON DIAGNOSTIC IMAGING OF CENTRAL NERVOUS SYSTEM: ICD-10-CM

## 2022-08-10 DIAGNOSIS — G04.91 MYELITIS, UNSPECIFIED: ICD-10-CM

## 2022-08-10 LAB — COPPER SERPL-MCNC: 74 UG/DL — SIGNIFICANT CHANGE UP (ref 69–132)

## 2022-08-10 PROCEDURE — 99496 TRANSJ CARE MGMT HIGH F2F 7D: CPT

## 2022-08-11 DIAGNOSIS — C64.9 MALIGNANT NEOPLASM OF UNSPECIFIED KIDNEY, EXCEPT RENAL PELVIS: ICD-10-CM

## 2022-08-11 DIAGNOSIS — Z92.21 PERSONAL HISTORY OF ANTINEOPLASTIC CHEMOTHERAPY: ICD-10-CM

## 2022-08-11 DIAGNOSIS — M21.372 FOOT DROP, LEFT FOOT: ICD-10-CM

## 2022-08-11 DIAGNOSIS — G62.9 POLYNEUROPATHY, UNSPECIFIED: ICD-10-CM

## 2022-08-11 DIAGNOSIS — D50.0 IRON DEFICIENCY ANEMIA SECONDARY TO BLOOD LOSS (CHRONIC): ICD-10-CM

## 2022-08-11 DIAGNOSIS — E66.01 MORBID (SEVERE) OBESITY DUE TO EXCESS CALORIES: ICD-10-CM

## 2022-08-11 DIAGNOSIS — I10 ESSENTIAL (PRIMARY) HYPERTENSION: ICD-10-CM

## 2022-08-11 DIAGNOSIS — G04.91 MYELITIS, UNSPECIFIED: ICD-10-CM

## 2022-08-11 DIAGNOSIS — K59.00 CONSTIPATION, UNSPECIFIED: ICD-10-CM

## 2022-08-11 DIAGNOSIS — Z85.048 PERSONAL HISTORY OF OTHER MALIGNANT NEOPLASM OF RECTUM, RECTOSIGMOID JUNCTION, AND ANUS: ICD-10-CM

## 2022-08-11 DIAGNOSIS — G47.33 OBSTRUCTIVE SLEEP APNEA (ADULT) (PEDIATRIC): ICD-10-CM

## 2022-08-11 DIAGNOSIS — Z91.014 ALLERGY TO MAMMALIAN MEATS: ICD-10-CM

## 2022-08-11 DIAGNOSIS — R20.2 PARESTHESIA OF SKIN: ICD-10-CM

## 2022-08-11 PROBLEM — R90.89 ABNORMAL MRI, SPINAL CORD: Status: ACTIVE | Noted: 2022-08-11

## 2022-08-11 LAB
GD1A GANGL IGG+IGM SER IA-ACNC: 18 IV — SIGNIFICANT CHANGE UP (ref 0–50)
GD1B GANGL IGG+IGM SER IA-ACNC: 18 IV — SIGNIFICANT CHANGE UP (ref 0–50)
GM1 ASIALO IGG+IGM SER IA-ACNC: 14 IV — SIGNIFICANT CHANGE UP (ref 0–50)
GM1 GANGL IGG+IGM SER-ACNC: 16 IV — SIGNIFICANT CHANGE UP (ref 0–50)
GM2 GANGL IGG+IGM SER IA-ACNC: 9 IV — SIGNIFICANT CHANGE UP (ref 0–50)
GQ1B GANGL IGG+IGM SER IA-ACNC: 8 IV — SIGNIFICANT CHANGE UP (ref 0–50)

## 2022-08-11 RX ORDER — PANTOPRAZOLE 40 MG/1
40 TABLET, DELAYED RELEASE ORAL
Qty: 30 | Refills: 0 | Status: ACTIVE | COMMUNITY
Start: 2022-08-08

## 2022-08-11 RX ORDER — PREDNISONE 5 MG/1
5 TABLET ORAL
Qty: 252 | Refills: 0 | Status: ACTIVE | COMMUNITY
Start: 2022-08-08

## 2022-08-11 NOTE — DISCUSSION/SUMMARY
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 MRI T/L spine without contrast revealed possibility of spinal cord conus lesion that could be inflammatory, infectious or demyelinating. Pt was treated with IV Solu-Medrol 1 g daily for 5 days, spinal tap under IR done revealed elevated protein, elevated IgG, and xanthochromia, cytology for malignant cells/PCR's - neg, cells < 2. , ACE, LYME, VDRL - negative. Patient was d/c home on prednisone 40 Mg daily taper plan.\par \par # Conus lesion c slight cord expansion, etiology still undetermined, inflammatory versus demyelinating versus neoplastic versus infectious; working diagnosis of myelitis /low-grade neoplasm\par \par CSF ~ very high protein, slight xanthochromia, negative PCR's and no cells rule out infectious process, \par \par #Chronic left lower extremity paresthesias, recent onset right lower extremity numbness plus saddle anesthesia and 1 episode of bowel incontinence. \par \par #Evolving peripheral neuropathy, with left foot drop over a year, possibility of demyelinating polyneuropathy\par \par -For now I have recommended continue prednisone taper\par -Follow-up with MRI cervical/thoracic and lumbar scans spine scans with contrast then decide any change in treatment plan\par -Patient is scheduled to have EMG/NCV studies at [place] in the next few days, that will be beneficial in determining the type and extent of polyneuropathy\par -Follow-up with CSF West Nile viral illness titers\par \par

## 2022-08-11 NOTE — PHYSICAL EXAM
[General Appearance - Alert] : alert [General Appearance - In No Acute Distress] : in no acute distress [Oriented To Time, Place, And Person] : oriented to person, place, and time [Mood] : the mood was normal [Person] : oriented to person [Place] : oriented to place [Time] : oriented to time [Concentration Intact] : normal concentrating ability [Visual Intact] : visual attention was ~T not ~L decreased [Naming Objects] : no difficulty naming common objects [Repeating Phrases] : no difficulty repeating a phrase [Writing A Sentence] : no difficulty writing a sentence [Fluency] : fluency intact [Comprehension] : comprehension intact [Reading] : reading intact [Past History] : adequate knowledge of personal past history [Cranial Nerves Optic (II)] : visual acuity intact bilaterally,  visual fields full to confrontation, pupils equal round and reactive to light [Cranial Nerves Oculomotor (III)] : extraocular motion intact [Cranial Nerves Trigeminal (V)] : facial sensation intact symmetrically [Cranial Nerves Facial (VII)] : face symmetrical [Cranial Nerves Vestibulocochlear (VIII)] : hearing was intact bilaterally [Cranial Nerves Glossopharyngeal (IX)] : tongue and palate midline [Cranial Nerves Accessory (XI - Cranial And Spinal)] : head turning and shoulder shrug symmetric [Cranial Nerves Hypoglossal (XII)] : there was no tongue deviation with protrusion [No Muscle Atrophy] : normal bulk in all four extremities [Sensation Tactile Decrease] : light touch was intact [Proprioception] : proprioception was intact [2+] : Brachioradialis left 2+ [0] : Ankle jerk left 0 [PERRL With Normal Accommodation] : pupils were equal in size, round, reactive to light, with normal accommodation [Extraocular Movements] : extraocular movements were intact [Full Visual Field] : full visual field [Neck Cervical Mass (___cm)] : no neck mass was observed [] : no respiratory distress [Auscultation Breath Sounds / Voice Sounds] : lungs were clear to auscultation bilaterally [Arterial Pulses Carotid] : carotid pulses were normal with no bruits [Edema] : there was no peripheral edema [Involuntary Movements] : no involuntary movements were seen [Past-pointing] : there was no past-pointing [Tremor] : no tremor present [Coordination - Dysmetria Impaired Finger-to-Nose Bilateral] : not present [Plantar Reflex Right Only] : normal on the right [Plantar Reflex Left Only] : normal on the left [FreeTextEntry6] : Motor: No pronator drift, strength in the bilateral upper extremities 5/5, right lower extremity hip flexion 5/5, dorsiflexion 5-/5, left lower extremity hip flexion 5-/5, dorsiflexion 0/5, plantarflexion 1-2/5, eversion and inversion 2/5.  Atrophy or left calf .  Fasciculation potentials. [FreeTextEntry7] : Decreased pinprick perception and vibration sense with distal to proximal gradient in the lower extremities [FreeTextEntry8] : Gait/balance, slapping gait, walks with the assistance of a cane

## 2022-08-11 NOTE — HISTORY OF PRESENT ILLNESS
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  Patient had been seen by Dr. Marks neurosurgeon who recommended MRI thoracic lumbar spine, the study performed without contrast revealed possibility of spinal cord conus lesion that could be inflammatory, infectious or demyelinating.  Patient was unable to get MRI of the spine and brain due to body habitus, was unable to be transferred to another facility during the hospital stay.  It was treated with IV Solu-Medrol 1 g daily for 5 days, spinal tap under IR done revealed elevated protein, elevated IgG, and xanthochromia; negative for cell cytology for malignant cells, negative PCR's, and cells < 2. , ACE, LYME, VDRL - negative.  Patient was discharged home on prednisone 40 Mg daily with taper plan, he had MRI cervical, thoracic, lumbar spine with contrast studies done as outpatient on 8/9/2022.\par \par Patient comes in for follow-up today, he is accompanied by his mother, he reports that after receiving Solu-Medrol first 2 days he felt better was able to walk, however he has now worsened, he is dragging his left leg, feels unstable on his feet, also complains of numbness in both lower extremities. He has not had any episodes of bowel incontinence, however reports that he has to strain hard to void urine.\par \par Patient is looking for options for further therapeutic measures as he feels he is not improving.

## 2022-08-11 NOTE — REVIEW OF SYSTEMS
[Feeling Poorly] : feeling poorly [Leg Weakness] : leg weakness [Poor Coordination] : poor coordination [Numbness] : numbness [Tingling] : tingling [Difficulty Walking] : difficulty walking [As Noted in HPI] : as noted in HPI [Negative] : Heme/Lymph [FreeTextEntry8] : Urinary hesitancy occasional

## 2022-08-11 NOTE — DATA REVIEWED
[de-identified] : 8/2/22: CSF studies: Protein 188, glucose 61, cells less than 2, RBCs 1083, PCR negative, IgG index 0.7, IgG elevated, gram stain negative, color xanthochromia color cytology negative for malignant cells, ACE, LYME, VDRL - negative\par Labs: ESR, CRP, TSH normal\par 8/1/2022: MRI thoracic and lumbar spine: Increased signal in the cord at the conus which is nonspecific may represent infection, inflammation or demyelination

## 2022-08-11 NOTE — REASON FOR VISIT
[Post Hospitalization] : a post hospitalization visit [Parent] : parent [FreeTextEntry1] : For possible myelitis/spinal cord conus lesion/neuropathy

## 2022-08-12 ENCOUNTER — INPATIENT (INPATIENT)
Facility: HOSPITAL | Age: 47
LOS: 3 days | Discharge: HOME | DRG: 98 | End: 2022-08-16
Attending: FAMILY MEDICINE | Admitting: INTERNAL MEDICINE
Payer: COMMERCIAL

## 2022-08-12 VITALS — HEIGHT: 75 IN | WEIGHT: 300.05 LBS

## 2022-08-12 DIAGNOSIS — Z98.890 OTHER SPECIFIED POSTPROCEDURAL STATES: Chronic | ICD-10-CM

## 2022-08-12 DIAGNOSIS — Z90.89 ACQUIRED ABSENCE OF OTHER ORGANS: Chronic | ICD-10-CM

## 2022-08-12 DIAGNOSIS — Z41.9 ENCOUNTER FOR PROCEDURE FOR PURPOSES OTHER THAN REMEDYING HEALTH STATE, UNSPECIFIED: Chronic | ICD-10-CM

## 2022-08-12 DIAGNOSIS — Z90.49 ACQUIRED ABSENCE OF OTHER SPECIFIED PARTS OF DIGESTIVE TRACT: Chronic | ICD-10-CM

## 2022-08-12 DIAGNOSIS — G37.3 ACUTE TRANSVERSE MYELITIS IN DEMYELINATING DISEASE OF CENTRAL NERVOUS SYSTEM: ICD-10-CM

## 2022-08-12 LAB
ALBUMIN SERPL ELPH-MCNC: 3.4 G/DL — SIGNIFICANT CHANGE UP (ref 3.3–5)
ALP SERPL-CCNC: 65 U/L — SIGNIFICANT CHANGE UP (ref 40–120)
ALT FLD-CCNC: 88 U/L — HIGH (ref 12–78)
ANION GAP SERPL CALC-SCNC: 6 MMOL/L — SIGNIFICANT CHANGE UP (ref 5–17)
APPEARANCE UR: CLEAR — SIGNIFICANT CHANGE UP
APTT BLD: 28.2 SEC — SIGNIFICANT CHANGE UP (ref 27.5–35.5)
AST SERPL-CCNC: 17 U/L — SIGNIFICANT CHANGE UP (ref 15–37)
BASOPHILS # BLD AUTO: 0 K/UL — SIGNIFICANT CHANGE UP (ref 0–0.2)
BASOPHILS NFR BLD AUTO: 0 % — SIGNIFICANT CHANGE UP (ref 0–2)
BILIRUB SERPL-MCNC: 1.6 MG/DL — HIGH (ref 0.2–1.2)
BILIRUB UR-MCNC: NEGATIVE — SIGNIFICANT CHANGE UP
BUN SERPL-MCNC: 20 MG/DL — SIGNIFICANT CHANGE UP (ref 7–23)
CALCIUM SERPL-MCNC: 8.9 MG/DL — SIGNIFICANT CHANGE UP (ref 8.5–10.1)
CHLORIDE SERPL-SCNC: 105 MMOL/L — SIGNIFICANT CHANGE UP (ref 96–108)
CO2 SERPL-SCNC: 26 MMOL/L — SIGNIFICANT CHANGE UP (ref 22–31)
COLOR SPEC: YELLOW — SIGNIFICANT CHANGE UP
CREAT SERPL-MCNC: 0.9 MG/DL — SIGNIFICANT CHANGE UP (ref 0.5–1.3)
DIFF PNL FLD: NEGATIVE — SIGNIFICANT CHANGE UP
EGFR: 105 ML/MIN/1.73M2 — SIGNIFICANT CHANGE UP
EOSINOPHIL # BLD AUTO: 0 K/UL — SIGNIFICANT CHANGE UP (ref 0–0.5)
EOSINOPHIL NFR BLD AUTO: 0 % — SIGNIFICANT CHANGE UP (ref 0–6)
FLUAV AG NPH QL: SIGNIFICANT CHANGE UP
FLUBV AG NPH QL: SIGNIFICANT CHANGE UP
GLUCOSE SERPL-MCNC: 130 MG/DL — HIGH (ref 70–99)
GLUCOSE UR QL: NEGATIVE — SIGNIFICANT CHANGE UP
HCT VFR BLD CALC: 46.4 % — SIGNIFICANT CHANGE UP (ref 39–50)
HGB BLD-MCNC: 15.9 G/DL — SIGNIFICANT CHANGE UP (ref 13–17)
INR BLD: 1.09 RATIO — SIGNIFICANT CHANGE UP (ref 0.88–1.16)
KETONES UR-MCNC: NEGATIVE — SIGNIFICANT CHANGE UP
LEUKOCYTE ESTERASE UR-ACNC: NEGATIVE — SIGNIFICANT CHANGE UP
LYMPHOCYTES # BLD AUTO: 1.54 K/UL — SIGNIFICANT CHANGE UP (ref 1–3.3)
LYMPHOCYTES # BLD AUTO: 17 % — SIGNIFICANT CHANGE UP (ref 13–44)
MAG AB SER-ACNC: NEGATIVE — SIGNIFICANT CHANGE UP
MCHC RBC-ENTMCNC: 28.9 PG — SIGNIFICANT CHANGE UP (ref 27–34)
MCHC RBC-ENTMCNC: 34.3 GM/DL — SIGNIFICANT CHANGE UP (ref 32–36)
MCV RBC AUTO: 84.2 FL — SIGNIFICANT CHANGE UP (ref 80–100)
MONOCYTES # BLD AUTO: 0.27 K/UL — SIGNIFICANT CHANGE UP (ref 0–0.9)
MONOCYTES NFR BLD AUTO: 3 % — SIGNIFICANT CHANGE UP (ref 2–14)
NEUTROPHILS # BLD AUTO: 7.26 K/UL — SIGNIFICANT CHANGE UP (ref 1.8–7.4)
NEUTROPHILS NFR BLD AUTO: 80 % — HIGH (ref 43–77)
NITRITE UR-MCNC: NEGATIVE — SIGNIFICANT CHANGE UP
NRBC # BLD: SIGNIFICANT CHANGE UP /100 WBCS (ref 0–0)
PH UR: 7 — SIGNIFICANT CHANGE UP (ref 5–8)
PLATELET # BLD AUTO: 148 K/UL — LOW (ref 150–400)
POTASSIUM SERPL-MCNC: 3.7 MMOL/L — SIGNIFICANT CHANGE UP (ref 3.5–5.3)
POTASSIUM SERPL-SCNC: 3.7 MMOL/L — SIGNIFICANT CHANGE UP (ref 3.5–5.3)
PROT SERPL-MCNC: 6.6 GM/DL — SIGNIFICANT CHANGE UP (ref 6–8.3)
PROT UR-MCNC: NEGATIVE — SIGNIFICANT CHANGE UP
PROTHROM AB SERPL-ACNC: 12.7 SEC — SIGNIFICANT CHANGE UP (ref 10.5–13.4)
RBC # BLD: 5.51 M/UL — SIGNIFICANT CHANGE UP (ref 4.2–5.8)
RBC # FLD: 12.5 % — SIGNIFICANT CHANGE UP (ref 10.3–14.5)
RSV RNA NPH QL NAA+NON-PROBE: SIGNIFICANT CHANGE UP
SARS-COV-2 RNA SPEC QL NAA+PROBE: SIGNIFICANT CHANGE UP
SODIUM SERPL-SCNC: 137 MMOL/L — SIGNIFICANT CHANGE UP (ref 135–145)
SP GR SPEC: 1.01 — SIGNIFICANT CHANGE UP (ref 1.01–1.02)
UROBILINOGEN FLD QL: NEGATIVE — SIGNIFICANT CHANGE UP
WBC # BLD: 9.08 K/UL — SIGNIFICANT CHANGE UP (ref 3.8–10.5)
WBC # FLD AUTO: 9.08 K/UL — SIGNIFICANT CHANGE UP (ref 3.8–10.5)

## 2022-08-12 PROCEDURE — 84100 ASSAY OF PHOSPHORUS: CPT

## 2022-08-12 PROCEDURE — 85025 COMPLETE CBC W/AUTO DIFF WBC: CPT

## 2022-08-12 PROCEDURE — 36415 COLL VENOUS BLD VENIPUNCTURE: CPT

## 2022-08-12 PROCEDURE — 80048 BASIC METABOLIC PNL TOTAL CA: CPT

## 2022-08-12 PROCEDURE — 83735 ASSAY OF MAGNESIUM: CPT

## 2022-08-12 PROCEDURE — 85652 RBC SED RATE AUTOMATED: CPT

## 2022-08-12 PROCEDURE — 99222 1ST HOSP IP/OBS MODERATE 55: CPT

## 2022-08-12 PROCEDURE — 80053 COMPREHEN METABOLIC PANEL: CPT

## 2022-08-12 PROCEDURE — 97110 THERAPEUTIC EXERCISES: CPT | Mod: GP

## 2022-08-12 PROCEDURE — 84425 ASSAY OF VITAMIN B-1: CPT

## 2022-08-12 PROCEDURE — 83036 HEMOGLOBIN GLYCOSYLATED A1C: CPT

## 2022-08-12 PROCEDURE — 99232 SBSQ HOSP IP/OBS MODERATE 35: CPT

## 2022-08-12 PROCEDURE — 84207 ASSAY OF VITAMIN B-6: CPT

## 2022-08-12 PROCEDURE — 85027 COMPLETE CBC AUTOMATED: CPT

## 2022-08-12 PROCEDURE — 97116 GAIT TRAINING THERAPY: CPT | Mod: GP

## 2022-08-12 PROCEDURE — 86140 C-REACTIVE PROTEIN: CPT

## 2022-08-12 PROCEDURE — 82306 VITAMIN D 25 HYDROXY: CPT

## 2022-08-12 PROCEDURE — 93010 ELECTROCARDIOGRAM REPORT: CPT

## 2022-08-12 PROCEDURE — 99223 1ST HOSP IP/OBS HIGH 75: CPT

## 2022-08-12 PROCEDURE — 97162 PT EVAL MOD COMPLEX 30 MIN: CPT | Mod: GP

## 2022-08-12 PROCEDURE — 99285 EMERGENCY DEPT VISIT HI MDM: CPT

## 2022-08-12 RX ORDER — ONDANSETRON 8 MG/1
4 TABLET, FILM COATED ORAL EVERY 6 HOURS
Refills: 0 | Status: DISCONTINUED | OUTPATIENT
Start: 2022-08-12 | End: 2022-08-16

## 2022-08-12 RX ORDER — PANTOPRAZOLE SODIUM 20 MG/1
40 TABLET, DELAYED RELEASE ORAL
Refills: 0 | Status: DISCONTINUED | OUTPATIENT
Start: 2022-08-12 | End: 2022-08-16

## 2022-08-12 RX ORDER — ACETAMINOPHEN 500 MG
650 TABLET ORAL ONCE
Refills: 0 | Status: DISCONTINUED | OUTPATIENT
Start: 2022-08-12 | End: 2022-08-12

## 2022-08-12 RX ORDER — PREGABALIN 225 MG/1
1000 CAPSULE ORAL DAILY
Refills: 0 | Status: DISCONTINUED | OUTPATIENT
Start: 2022-08-12 | End: 2022-08-16

## 2022-08-12 RX ORDER — POLYETHYLENE GLYCOL 3350 17 G/17G
34 POWDER, FOR SOLUTION ORAL
Qty: 0 | Refills: 0 | DISCHARGE

## 2022-08-12 RX ORDER — ENOXAPARIN SODIUM 100 MG/ML
40 INJECTION SUBCUTANEOUS EVERY 24 HOURS
Refills: 0 | Status: DISCONTINUED | OUTPATIENT
Start: 2022-08-12 | End: 2022-08-16

## 2022-08-12 RX ORDER — IMMUNE GLOBULIN (HUMAN) 10 G/100ML
55 INJECTION INTRAVENOUS; SUBCUTANEOUS DAILY
Refills: 0 | Status: COMPLETED | OUTPATIENT
Start: 2022-08-12 | End: 2022-08-16

## 2022-08-12 RX ORDER — ACETAMINOPHEN 500 MG
650 TABLET ORAL DAILY
Refills: 0 | Status: COMPLETED | OUTPATIENT
Start: 2022-08-12 | End: 2022-08-16

## 2022-08-12 RX ORDER — POLYETHYLENE GLYCOL 3350 17 G/17G
17 POWDER, FOR SOLUTION ORAL
Refills: 0 | Status: DISCONTINUED | OUTPATIENT
Start: 2022-08-12 | End: 2022-08-16

## 2022-08-12 RX ORDER — DIPHENHYDRAMINE HCL 50 MG
25 CAPSULE ORAL DAILY
Refills: 0 | Status: COMPLETED | OUTPATIENT
Start: 2022-08-12 | End: 2022-08-16

## 2022-08-12 RX ORDER — LANOLIN ALCOHOL/MO/W.PET/CERES
3 CREAM (GRAM) TOPICAL AT BEDTIME
Refills: 0 | Status: DISCONTINUED | OUTPATIENT
Start: 2022-08-12 | End: 2022-08-16

## 2022-08-12 RX ORDER — DIPHENHYDRAMINE HCL 50 MG
25 CAPSULE ORAL ONCE
Refills: 0 | Status: DISCONTINUED | OUTPATIENT
Start: 2022-08-12 | End: 2022-08-12

## 2022-08-12 RX ORDER — PSYLLIUM SEED (WITH DEXTROSE)
2 POWDER (GRAM) ORAL
Refills: 0 | Status: DISCONTINUED | OUTPATIENT
Start: 2022-08-12 | End: 2022-08-16

## 2022-08-12 RX ORDER — LISINOPRIL 2.5 MG/1
20 TABLET ORAL DAILY
Refills: 0 | Status: DISCONTINUED | OUTPATIENT
Start: 2022-08-12 | End: 2022-08-16

## 2022-08-12 RX ADMIN — Medication 650 MILLIGRAM(S): at 12:25

## 2022-08-12 RX ADMIN — Medication 650 MILLIGRAM(S): at 11:27

## 2022-08-12 RX ADMIN — POLYETHYLENE GLYCOL 3350 17 GRAM(S): 17 POWDER, FOR SOLUTION ORAL at 22:07

## 2022-08-12 RX ADMIN — PREGABALIN 1000 MICROGRAM(S): 225 CAPSULE ORAL at 15:30

## 2022-08-12 RX ADMIN — Medication 25 MILLIGRAM(S): at 11:30

## 2022-08-12 RX ADMIN — Medication 3 MILLIGRAM(S): at 22:08

## 2022-08-12 RX ADMIN — IMMUNE GLOBULIN (HUMAN) 137.5 GRAM(S): 10 INJECTION INTRAVENOUS; SUBCUTANEOUS at 12:03

## 2022-08-12 NOTE — ED ADULT NURSE REASSESSMENT NOTE - NS ED NURSE REASSESS COMMENT FT1
IVIG infusing as ordered. No S+S of reaction. Pt instructed in reportable S+S. Monitored closely. Call bell in reach.

## 2022-08-12 NOTE — CONSULT NOTE ADULT - SUBJECTIVE AND OBJECTIVE BOX
Patient is a 47y old  Male who presents with a chief complaint of worsening weakness, numbness, gait.    HPI: Mr. Ortez is a 47 year old man with a history of HTN, rectal CA s/p resection/chemo/RT 4 years ago, sarcoma s/p resection from the left lower extremity, recent renal cancer, peripheral neuropathy and lumbar DJD. He was admitted to [facility] on 8/2/22 with complaints of worsening new right lower extremity paresthesias and weakness for several weeks, saddle anesthesia and an episode of bowel incontinence He had imaging of his spine revealing the possibility of a spinal cord/conus lesion of possibly inflammatory, infectious or demyelinating etiology. Additional MRIs were unable to be performed due to body habitus.  An LP was done revealing increased protein, IgG and xanthochromia. There was no evidence of malignant cells or infection.  He was treated with IV Solu-Medrol 1 gram x 5 days and discharged on 8/8/22 with an oral steroid taper.  He had repeat imaging on 8/9 and saw Dr. Wiseman on 8/10/22 at which time he reported worsening of gait, dragging his left leg and increased numbness in his lower extremities. He also reported straining to urinate. He had NCV on 8/11.  He was advised to come to the hospital to expedite initiation of IVIg.    He has no new complaints other than continuation of symptoms listed above.        PAST MEDICAL & SURGICAL HISTORY:  Essential hypertension      Seasonal allergies      Mild intermittent asthma without complication      Iron deficiency anemia due to chronic blood loss      Lumbar herniated disc      Obstructive sleep apnea syndrome  non compliant with CPAP      Sarcoma      Foot drop, left      Morbid obesity      Port-A-Cath in place  right chest wall inserted 2018      Hearing loss  b/l      Anemia  history of anemia      Rectal cancer  completed chemotherapy      COVID-19 vaccine series completed  [employer]- March      Peripheral neuropathy  chemotherpay induced peripheral neuropathy      History of colon surgery  for rectal cancer- ileostomy creatio and reversal in 2019      H/O myringotomy  bilateral. 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      S/P colonoscopy      History of other surgery  port cath insertion 08/2018      Status post proctocolectomy  with temporary Ileostomy- reversed 2019          FAMILY HISTORY:  Family history of hypertension (Father)    Family history of renal disease (Father)        Social Hx:  Nonsmoker, no drug or alcohol use    MEDICATIONS  (STANDING):  acetaminophen     Tablet .. 650 milliGRAM(s) Oral daily  diphenhydrAMINE 25 milliGRAM(s) Oral daily  immune   globulin 10% (GAMMAGARD) IVPB 55 Gram(s) IV Intermittent daily       Allergies    iodinated radiocontrast agents (Rash; Urticaria; Flushing; Hives)    Intolerances        ROS: Pertinent positives in HPI, all other ROS were reviewed and are negative.      Vital Signs Last 24 Hrs  T(C): 36.7 (12 Aug 2022 10:27), Max: 36.7 (12 Aug 2022 08:51)  T(F): 98.1 (12 Aug 2022 10:27), Max: 98.1 (12 Aug 2022 10:27)  HR: 72 (12 Aug 2022 10:27) (71 - 72)  BP: 132/94 (12 Aug 2022 10:27) (132/94 - 153/89)  BP(mean): 102 (12 Aug 2022 10:27) (102 - 105)  RR: 17 (12 Aug 2022 10:27) (17 - 17)  SpO2: 98% (12 Aug 2022 10:27) (97% - 98%)    Parameters below as of 12 Aug 2022 10:27  Patient On (Oxygen Delivery Method): room air            Constitutional: awake and alert.  HEENT: PERRLA, EOMI,   Neck: Supple.  Respiratory: Breath sounds are clear bilaterally  Cardiovascular: S1 and S2, regular / irregular rhythm  Gastrointestinal: soft, nontender  Extremities:  no edema  Vascular: Caritid Bruit - no  Musculoskeletal: no joint swelling/tenderness, no abnormal movements  Skin: No rashes    Neuro  HF: A x O x 3. Appropriately interactive, normal affect. Speech fluent, No Aphasia or paraphasic errors. Naming /repetition intact   CN: FRANCISCO, EOMI, VFF, facial sensation normal, no NLFD, tongue midline, Palate moves equally, SCM equal bilaterally  Motor: No pronator drift, Strength 5/5 in BUE and RLE 5/5, Left  HF 5-/5, KE 5/5, KF 5-/5, DF 0/5, PF 0/5  Sens: decreased sensation below the knee in both lower extremities, left worse than right  Reflexes: UEs  1+ in biceps, radialis, absent  patellar on right, absent patellars and ankle jerks, downgoing toes b/l, negative clonus  Coord:  No FNFA  Gait: using cane, unsteady            Labs:   08-12    137  |  105  |  20  ----------------------------<  130<H>  3.7   |  26  |  0.90    Ca    8.9      12 Aug 2022 09:40    TPro  6.6  /  Alb  3.4  /  TBili  1.6<H>  /  DBili  x   /  AST  17  /  ALT  88<H>  /  AlkPhos  65  08-12                              15.9   9.08  )-----------( 148      ( 12 Aug 2022 09:40 )             46.4       Radiology:    MRI thoracic and lumbar spine 8/1/22:  1. Poorly marginated STIR and T2 hyperintensity involving the distal cord   and  conus could reflect cord edema versus myelomalacia/gliosis and   correlation with  clinical data is requested.  2. Broad-based posterior disc bulges are seen atL3-L4 and L4-L5 with the  central canal adequate at these and all remaining lumbar levels.  Right  foraminal disc protrusion at L4-L5 results in right foraminal narrowing   with  neural foramina adequate at remaining lumbar levels.    Final report: Correlation with thoracic spine obtained concurrently   demonstrates increased signal in the cord the conus which is nonspecific   but may represent infection inflammation or demyelination. Recommend   repeat thoracic spine with contrast.      CSF studies: Protein 188, Glucose 61, Cells < 2, RBC 1083, PCR - negative, IgG index 0.7, Gr stain neg, Color- xanthochromia. Cytology - neg malignant cells.   Lyme, ACE, RPR, WNV pending      Vitamin B12 393      MRI cervical, thoracic, lumbar spine with and without contrast 8/9/22:  1.  Poorly marginated T2 hyperintense enhancing region in the conus,   similar in appearance to prior study. Differential diagnosis includes   inflammatory/demyelinating lesion versus neoplasm versus infection. Given   the results of the lumbar puncture, demyelinating/inflammatory lesion is   favored.  2.  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  3.  No osseous metastases in the spine.

## 2022-08-12 NOTE — ED PROVIDER NOTE - PHYSICAL EXAMINATION
General: AAOx3, NAD  HEENT: NCAT  Cardiac: Normal rate and rhythym, no murmurs, normal peripheral perfusion  Respiratory: Normal rate and effort. CTAB  GI: Soft, nondistended, nontender  Neuro:  +decreased sensation to light touch intact throughout decreased sensation to light touch in the medial and lateral L foot 1/56 flexion and extension on ankle, normal SLR.   MSK: FROMx4, no focal bony tenderness, no peripheral edema  Skin: No rash

## 2022-08-12 NOTE — ED PROVIDER NOTE - NS ED ROS FT
Constitutional: No fevers, chills, or sweats.  Cardiac: No chest pain, exertional dyspnea, orthopnea  Respiratory: No shortness of breath, no cough  GI: No abdominal pain, no N/V +mild diarrhea  Neuro: No headaches, no neck pain/stiffness, no numbness  MSK: +leg numbness and weakness  All other systems reviewed and are negative unless otherwise stated in the HPI.

## 2022-08-12 NOTE — ED ADULT NURSE REASSESSMENT NOTE - NS ED NURSE REASSESS COMMENT FT1
Pt A+Ox4. VSS. Pt c/o left leg numbness. Pt recently diagnosed with transverse myelitis and oral steroids have not been working. Awaiting IVIG for infusion. Pt and father instructed on plan of care. Monitored closely. Call bell in reach. Pt A+Ox4. VSS. Pt c/o B/L LE numbness L>R. Has difficulty walking due to LLE decreased strength. Pt recently diagnosed with transverse myelitis and oral steroids have not been working. Awaiting IVIG for infusion. Pt and father instructed on plan of care. Monitored closely. Call bell in reach.

## 2022-08-12 NOTE — H&P ADULT - HISTORY OF PRESENT ILLNESS
History of Present Illness:   47 year old obese man with a PMHx of colorectal cancer s/p resection and chemotherapy 4 years ago, peripheral neuropathy, chronic left foot drop for more than 1yr, Htn,  recently diagnosed renal Ca awaiting [place] [facility] [place], admitted at  last week and treated with pulse dose steroids x 5days for a demyelinating process involving his lumbar spine, returns now to the ED, sent by his neurologist for worsening symptoms of LLE paresthesia and weakness, unable to walk w/o cane.  Last week  he had an MRI, LP which revealed an inflammatory process; Nerve conduction studies were performed as outpatient this week and revealed demyelinating disease.    On his last admission his symptoms have improved after 5 days of steroids and was able to walk unassisted; now he states that his numbness and weakness on his legs L>R have worsened after discharge and he is back to using his cane.  He has had a chronic Left foot drop for >1yr; he now states that there is worsening in his foot drop. On his Rt foot there is no motor deficits but there is paresthesia from below the knee.  He also describes saddle anesthesia w/o bowel incontinence.      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  Rectal cancer, completed chemotherapy  Status post proctocolectomy  with temporary Ileostomy- reversed 2019  Peripheral neuropathy  port cath insertion 08/2018      FAMILY HISTORY:  Father: Htn, Valve replacements, renal transplant     Social History:   Nonsmoker, no drug or alcohol use            REVIEW OF SYSTEMS:    CONSTITUTIONAL: No weakness, No fevers or chills  ENT: No ear ache, No sorethroat  NECK: No pain, No stiffness  RESPIRATORY: No cough, No wheezing, No hemoptysis; No dyspnea  CARDIOVASCULAR: No chest pain, No palpitations  GASTROINTESTINAL: No abd pain, No nausea, No vomiting, No hematemesis, No diarrhea or constipation. No melena, No hematochezia.  GENITOURINARY: No dysuria, No  hematuria  NEUROLOGICAL: No diplopia, +left and right foot  paresthesia, Left leg weakness  MUSCULOSKELETAL: No arthralgia, No myalgia  SKIN: No rashes, or lesions   PSYCH: no anxiety, no suicidal ideation    All other review of systems is negative unless indicated above    Vital Signs Last 24 Hrs  T(C): 36.8 (12 Aug 2022 12:59), Max: 36.8 (12 Aug 2022 12:59)  T(F): 98.2 (12 Aug 2022 12:59), Max: 98.2 (12 Aug 2022 12:59)  HR: 81 (12 Aug 2022 12:59) (69 - 81)  BP: 100/87 (12 Aug 2022 12:59) (100/87 - 153/89)  BP(mean): 102 (12 Aug 2022 10:27) (102 - 105)  RR: 18 (12 Aug 2022 12:59) (17 - 18)  SpO2: 95% (12 Aug 2022 12:59) (95% - 98%)    Parameters below as of 12 Aug 2022 12:41  Patient On (Oxygen Delivery Method): room air        PHYSICAL EXAM:    GENERAL: NAD  HEENT:  NC/AT, EOMI, PERRLA, No scleral icterus, Moist mucous membranes  NECK: Supple, No JVD  CNS:  Alert & Oriented X3, +left foot drop, rt foot 5/5 strengths, +bilateral paresthesia of the legs and feet (L>R),  DTRs 2+ intact   LUNG: Normal Breath sounds, Clear to auscultation bilaterally, No rales, No rhonchi, No wheezing  HEART: RRR; No murmurs, No rubs  ABDOMEN: +BS, ST/ND/NT  GENITOURINARY: Voiding, Bladder not distended  EXTREMITIES:  2+ Peripheral Pulses, No clubbing, No cyanosis, No tibial edema  MUSCULOSKELTAL: Joints normal ROM, No TTP, No effusion  SKIN: no rashes  RECTAL: deferred, not indicated  BREAST: deferred                          15.9   9.08  )-----------( 148      ( 12 Aug 2022 09:40 )             46.4     08-12    137  |  105  |  20  ----------------------------<  130<H>  3.7   |  26  |  0.90    Ca    8.9      12 Aug 2022 09:40    TPro  6.6  /  Alb  3.4  /  TBili  1.6<H>  /  DBili  x   /  AST  17  /  ALT  88<H>  /  AlkPhos  65  08-12    Vancomycin levels:   Cultures:     MEDICATIONS  (STANDING):  acetaminophen     Tablet .. 650 milliGRAM(s) Oral daily  diphenhydrAMINE 25 milliGRAM(s) Oral daily  immune   globulin 10% (GAMMAGARD) IVPB 55 Gram(s) IV Intermittent daily    MEDICATIONS  (PRN):  ondansetron Injectable 4 milliGRAM(s) IV Push every 6 hours PRN Nausea and/or Vomiting      a/p:    1. Lumbar spine acute demyelinating disease:  -r/o TM vs other type of demyelinating process  -no evidence of leptomeningeal spread  : cytology negative in CSF  -MRI lumbar and thoracic spine w/o contrast noted: +inflammation in conus medullaris   -LP done: no evidence of infection, increased Proteins points toward an inflammatory process; cytology negative in CSF  -s/p Solumedrol IV 1gm daily x 5days, with transient initial  improvement in paresthesia and motor function     Admit to med surg floor:  -start IVIG x 5days  -neurology follow up  -c/w Prednisone 40mg/day and taper by 5mg a week       2. Htn:   c/w ACEinh    3. H/o chemotherapy induced peripheral neuropathy; chronic L foot drop    4.  h/o Colon CA, h/o LLE sarcoma, h/o renal CA :   Small renal Ca : scheduled for cryoablation at [facility] [place]    5. Constipation:  MIralax, Dulcolax, Ambulation

## 2022-08-12 NOTE — ED ADULT TRIAGE NOTE - CHIEF COMPLAINT QUOTE
pt. sent by Dr. Cabello for IVIG infusion for spinal infection/inflammation not improving with steroid treatment. increased difficulty ambulating.

## 2022-08-12 NOTE — ED ADULT NURSE NOTE - CAS DISCH BELONGINGS RETURNED
platelets of 103, at baseline  2/2 splenomegaly and sequestration   monitor CBC counts
Not applicable

## 2022-08-12 NOTE — PATIENT PROFILE ADULT - FALL HARM RISK - HARM RISK INTERVENTIONS

## 2022-08-12 NOTE — ED PROVIDER NOTE - CLINICAL SUMMARY MEDICAL DECISION MAKING FREE TEXT BOX
Will get labs, discuss with neuro and likely ADM for IVIG. Will get labs, discuss with neuro and likely ADM for IVIG. NSR at 74  bpm. Normal axis, normal intervals, no acute ischemic changes.

## 2022-08-12 NOTE — CONSULT NOTE ADULT - ASSESSMENT
47 year old obese man with a PMHx of HTN, rectal cancer s/p res/chemo/RT 4 years ago,   S/P sarcoma resection from LLE - peripheral neuropathy and lumbar DJD, chronic left foot drop that evolved x past year, was recently diagnosed with renal CA, has had paresthesias left leg x a year, has been seen by number of specialists, lumbar surgery was being contemplated, he presented to  ED on 8/2 with complaint of worsening RLE paresthesias/weakness x 3 weeks and saddle anesthesia x 4 days, had an episode of bowel incontinence 4 days prior to admission.    He had initial improvement in his symptoms after steroids but worsened as an outpatient.    Testing including NCV performed at [employer] [facility] was discussed between Dr. Wiseman and neuromuscular specialists and felt to be a severe demyelinating neuropathy which has evolved to axonal loss.    He is now being admitted for IVIg.    -Based on weight patient will receive IVIg 55 grams/daily x 5 days.  -He can continue his oral steroid taper.    -Physical therapy  -DVT prophylaxis.    Dr. Cassidy will cover on 8/13 and 8/14.

## 2022-08-12 NOTE — ED PROVIDER NOTE - OBJECTIVE STATEMENT
48 y/o male with a PMhx of colon surgery, peripheral neuropathy, rectal CA on chemo 4 yrs ago, anemia, hearing loss, port-a-cath in place, sarcoma, [facility], lumbar herniated disc, intermittent asthma, HTN, kidney CA  presents to the ED IVIG infusion. Sent from Dr Cabello.  States he has been having worsening leg weakness and numbness x3 days ago which began with left then moved to the right. Notes he was dx with TM. States he has been on Prednisone. Notes mild diarrhea. No CP, SOB. Reports he doesn't have any difficulties ambulating.

## 2022-08-12 NOTE — PATIENT PROFILE ADULT - BILL PAYMENT
Department of Obstetrics and Gynecology   Brief Operative Report        Pre-operative Diagnosis:  9 week missed     Post-operative Diagnosis:  Same    Procedure: suction D&C    Surgeon:  Amadou Aguirre MD    Anesthesia:  general    Findings: 9 week sized uterus    Estimated blood loss:  50 mL    Specimens: products of conception    Complications:  none    Condition:  stable        See dictated operative report for full details. no

## 2022-08-13 PROCEDURE — 99232 SBSQ HOSP IP/OBS MODERATE 35: CPT

## 2022-08-13 RX ADMIN — Medication 650 MILLIGRAM(S): at 14:05

## 2022-08-13 RX ADMIN — LISINOPRIL 20 MILLIGRAM(S): 2.5 TABLET ORAL at 14:05

## 2022-08-13 RX ADMIN — POLYETHYLENE GLYCOL 3350 17 GRAM(S): 17 POWDER, FOR SOLUTION ORAL at 14:06

## 2022-08-13 RX ADMIN — Medication 3 MILLIGRAM(S): at 22:23

## 2022-08-13 RX ADMIN — PREGABALIN 1000 MICROGRAM(S): 225 CAPSULE ORAL at 14:08

## 2022-08-13 RX ADMIN — POLYETHYLENE GLYCOL 3350 17 GRAM(S): 17 POWDER, FOR SOLUTION ORAL at 21:12

## 2022-08-13 RX ADMIN — Medication 40 MILLIGRAM(S): at 14:05

## 2022-08-13 RX ADMIN — IMMUNE GLOBULIN (HUMAN) 137.5 GRAM(S): 10 INJECTION INTRAVENOUS; SUBCUTANEOUS at 15:17

## 2022-08-13 RX ADMIN — PANTOPRAZOLE SODIUM 40 MILLIGRAM(S): 20 TABLET, DELAYED RELEASE ORAL at 06:29

## 2022-08-13 RX ADMIN — Medication 650 MILLIGRAM(S): at 14:09

## 2022-08-13 RX ADMIN — Medication 25 MILLIGRAM(S): at 14:05

## 2022-08-13 RX ADMIN — Medication 2 PACKET(S): at 14:06

## 2022-08-13 NOTE — PHYSICAL THERAPY INITIAL EVALUATION ADULT - PERTINENT HX OF CURRENT PROBLEM, REHAB EVAL
47 year old obese man with a PMHx of colorectal cancer s/p resection and chemotherapy 4 years ago, peripheral neuropathy, chronic left foot drop for more than 1yr, Htn,  recently diagnosed renal Ca awaiting cryoablation tx at [facility], admitted at  last week and treated with pulse dose steroids x 5days for a demyelinating process involving his lumbar spine, returns now to the ED, sent by his neurologist for worsening symptoms of LLE paresthesia and weakness, unable to walk w/o cane.    Last week  he had an MRI, LP which revealed an inflammatory process; Nerve conduction studies were performed as outpatient this week and revealed demyelinating disease.    H/o chemotherapy induced peripheral neuropathy; chronic L foot drop

## 2022-08-13 NOTE — PROGRESS NOTE ADULT - ASSESSMENT
47 year old obese man with a PMHx of colorectal cancer s/p resection and chemotherapy 4 years ago, peripheral neuropathy, chronic left foot drop for more than 1yr, Htn,  recently diagnosed [place] Ca [place] [facility] [place]  admitted at  last week and treated with pulse dose steroids x 5days for a demyelinating process involving his lumbar spine, returns now to the ED, sent by his neurologist for worsening symptoms of LLE paresthesia and weakness, unable to walk w/o cane.      Lumbar spine acute demyelinating disease affecting conus medullaris with left foot drop and saddle anaesthesia    -r/o TM vs other type of demyelinating process  -no evidence of leptomeningeal spread  : cytology negative in CSF  -MRI lumbar and thoracic spine w/o contrast noted: +inflammation in conus medullaris   -LP done: no evidence of infection, increased Proteins points toward an inflammatory process; cytology negative in CSF  -s/p Solumedrol IV 1gm daily x 5days, with transient initial  improvement in paresthesia and motor function   - c/w  IVIG x 5days, CRP 3   -neurology follow up  -c/w Prednisone 40mg/day and taper by 5mg a week     Constipation- MIralax, Dulcolax     HTN - c/w ACEinh    H/o chemotherapy induced peripheral neuropathy; chronic L foot drop - c/w B12 supplements , PT     h/o Colon CA, h/o LLE sarcoma, h/o renal CA :   Small renal Ca : [place] [facility] [place]    Dispo - IV IG

## 2022-08-13 NOTE — PHYSICAL THERAPY INITIAL EVALUATION ADULT - ACTIVE RANGE OF MOTION EXAMINATION, REHAB EVAL
except L ft and R great toe/bilateral upper extremity Active ROM was WFL (within functional limits)/bilateral  lower extremity Active ROM was WFL (within functional limits)

## 2022-08-13 NOTE — PHYSICAL THERAPY INITIAL EVALUATION ADULT - GENERAL OBSERVATIONS, REHAB EVAL
. Pt seen on 1N, NAD, willing and cooperative, wants to go home. L LE atrophy noted, L foot drop+ pt states he has had ft drop 1 year but now it is complete.

## 2022-08-13 NOTE — PHYSICAL THERAPY INITIAL EVALUATION ADULT - MANUAL MUSCLE TESTING RESULTS, REHAB EVAL
except: R UE 5/5 L UE 4+/5 with sustained hold, R LE 5/5 expect R Great Toe 0/5, L hip flexor 4+/5, DF/EV/IV 0/5 PF 3+/5/no strength deficits were identified

## 2022-08-13 NOTE — PHYSICAL THERAPY INITIAL EVALUATION ADULT - MODALITIES TREATMENT COMMENTS
Pt returned to supine, +alarm, NAD, denies pain, wants to go home. instructed in need for AFO L foot, and SC adjusted for use. Instructed in stairs and gait patterning for safety. All questions answered. PT recommended grab bars in showers and shower bench, and non slip mat. .

## 2022-08-13 NOTE — PHYSICAL THERAPY INITIAL EVALUATION ADULT - GAIT DISTANCE, PT EVAL
treatment delay explained/plan of care explained/wait time explained
plan of care explained/wait time explained
200 feet

## 2022-08-14 LAB
24R-OH-CALCIDIOL SERPL-MCNC: 25.6 NG/ML — LOW (ref 30–80)
ADD ON TEST-SPECIMEN IN LAB: SIGNIFICANT CHANGE UP
ALBUMIN SERPL ELPH-MCNC: 3.1 G/DL — LOW (ref 3.3–5)
ALP SERPL-CCNC: 83 U/L — SIGNIFICANT CHANGE UP (ref 40–120)
ALT FLD-CCNC: 54 U/L — SIGNIFICANT CHANGE UP (ref 12–78)
ANION GAP SERPL CALC-SCNC: 2 MMOL/L — LOW (ref 5–17)
AST SERPL-CCNC: 16 U/L — SIGNIFICANT CHANGE UP (ref 15–37)
BASOPHILS # BLD AUTO: 0.02 K/UL — SIGNIFICANT CHANGE UP (ref 0–0.2)
BASOPHILS NFR BLD AUTO: 0.3 % — SIGNIFICANT CHANGE UP (ref 0–2)
BILIRUB SERPL-MCNC: 0.7 MG/DL — SIGNIFICANT CHANGE UP (ref 0.2–1.2)
BUN SERPL-MCNC: 18 MG/DL — SIGNIFICANT CHANGE UP (ref 7–23)
CALCIUM SERPL-MCNC: 8.8 MG/DL — SIGNIFICANT CHANGE UP (ref 8.5–10.1)
CHLORIDE SERPL-SCNC: 105 MMOL/L — SIGNIFICANT CHANGE UP (ref 96–108)
CO2 SERPL-SCNC: 30 MMOL/L — SIGNIFICANT CHANGE UP (ref 22–31)
CREAT SERPL-MCNC: 0.82 MG/DL — SIGNIFICANT CHANGE UP (ref 0.5–1.3)
CRP SERPL-MCNC: 7 MG/L — HIGH
EGFR: 108 ML/MIN/1.73M2 — SIGNIFICANT CHANGE UP
EOSINOPHIL # BLD AUTO: 0.02 K/UL — SIGNIFICANT CHANGE UP (ref 0–0.5)
EOSINOPHIL NFR BLD AUTO: 0.3 % — SIGNIFICANT CHANGE UP (ref 0–6)
GLUCOSE SERPL-MCNC: 120 MG/DL — HIGH (ref 70–99)
HCT VFR BLD CALC: 41.3 % — SIGNIFICANT CHANGE UP (ref 39–50)
HGB BLD-MCNC: 13.9 G/DL — SIGNIFICANT CHANGE UP (ref 13–17)
IMM GRANULOCYTES NFR BLD AUTO: 2.4 % — HIGH (ref 0–1.5)
LYMPHOCYTES # BLD AUTO: 0.86 K/UL — LOW (ref 1–3.3)
LYMPHOCYTES # BLD AUTO: 13.9 % — SIGNIFICANT CHANGE UP (ref 13–44)
MCHC RBC-ENTMCNC: 28.7 PG — SIGNIFICANT CHANGE UP (ref 27–34)
MCHC RBC-ENTMCNC: 33.7 GM/DL — SIGNIFICANT CHANGE UP (ref 32–36)
MCV RBC AUTO: 85.2 FL — SIGNIFICANT CHANGE UP (ref 80–100)
MONOCYTES # BLD AUTO: 0.37 K/UL — SIGNIFICANT CHANGE UP (ref 0–0.9)
MONOCYTES NFR BLD AUTO: 6 % — SIGNIFICANT CHANGE UP (ref 2–14)
NEUTROPHILS # BLD AUTO: 4.77 K/UL — SIGNIFICANT CHANGE UP (ref 1.8–7.4)
NEUTROPHILS NFR BLD AUTO: 77.1 % — HIGH (ref 43–77)
PLATELET # BLD AUTO: 111 K/UL — LOW (ref 150–400)
POTASSIUM SERPL-MCNC: 3.8 MMOL/L — SIGNIFICANT CHANGE UP (ref 3.5–5.3)
POTASSIUM SERPL-SCNC: 3.8 MMOL/L — SIGNIFICANT CHANGE UP (ref 3.5–5.3)
PROT SERPL-MCNC: 7.7 GM/DL — SIGNIFICANT CHANGE UP (ref 6–8.3)
RBC # BLD: 4.85 M/UL — SIGNIFICANT CHANGE UP (ref 4.2–5.8)
RBC # FLD: 12.5 % — SIGNIFICANT CHANGE UP (ref 10.3–14.5)
SODIUM SERPL-SCNC: 137 MMOL/L — SIGNIFICANT CHANGE UP (ref 135–145)
WBC # BLD: 6.19 K/UL — SIGNIFICANT CHANGE UP (ref 3.8–10.5)
WBC # FLD AUTO: 6.19 K/UL — SIGNIFICANT CHANGE UP (ref 3.8–10.5)

## 2022-08-14 PROCEDURE — 99232 SBSQ HOSP IP/OBS MODERATE 35: CPT

## 2022-08-14 RX ORDER — CHOLECALCIFEROL (VITAMIN D3) 125 MCG
2000 CAPSULE ORAL DAILY
Refills: 0 | Status: DISCONTINUED | OUTPATIENT
Start: 2022-08-14 | End: 2022-08-16

## 2022-08-14 RX ADMIN — PANTOPRAZOLE SODIUM 40 MILLIGRAM(S): 20 TABLET, DELAYED RELEASE ORAL at 05:58

## 2022-08-14 RX ADMIN — Medication 5 MILLIGRAM(S): at 22:32

## 2022-08-14 RX ADMIN — LISINOPRIL 20 MILLIGRAM(S): 2.5 TABLET ORAL at 10:04

## 2022-08-14 RX ADMIN — Medication 25 MILLIGRAM(S): at 10:06

## 2022-08-14 RX ADMIN — Medication 3 MILLIGRAM(S): at 23:13

## 2022-08-14 RX ADMIN — POLYETHYLENE GLYCOL 3350 17 GRAM(S): 17 POWDER, FOR SOLUTION ORAL at 10:04

## 2022-08-14 RX ADMIN — Medication 650 MILLIGRAM(S): at 11:07

## 2022-08-14 RX ADMIN — IMMUNE GLOBULIN (HUMAN) 137.5 GRAM(S): 10 INJECTION INTRAVENOUS; SUBCUTANEOUS at 12:06

## 2022-08-14 RX ADMIN — Medication 40 MILLIGRAM(S): at 10:05

## 2022-08-14 RX ADMIN — Medication 2000 UNIT(S): at 12:05

## 2022-08-14 RX ADMIN — POLYETHYLENE GLYCOL 3350 17 GRAM(S): 17 POWDER, FOR SOLUTION ORAL at 22:32

## 2022-08-14 RX ADMIN — Medication 650 MILLIGRAM(S): at 10:07

## 2022-08-14 RX ADMIN — PREGABALIN 1000 MICROGRAM(S): 225 CAPSULE ORAL at 10:06

## 2022-08-14 NOTE — PROGRESS NOTE ADULT - ASSESSMENT
47 year old obese man with a PMHx of colorectal cancer s/p resection and chemotherapy 4 years ago, peripheral neuropathy, chronic left foot drop for more than 1yr, Htn,  recently diagnosed renal Ca awaiting cryoablation tx at [facility] [place], [place]  admitted at  last week and treated with pulse dose steroids x 5days for a demyelinating process involving his lumbar spine, returns now to the ED, sent by his neurologist for worsening symptoms of LLE paresthesia and weakness, unable to walk w/o cane.      Lumbar spine acute demyelinating disease affecting conus medullaris with left foot drop and saddle anaesthesia    -r/o TM vs other type of demyelinating process  -no evidence of leptomeningeal spread  : cytology negative in CSF  -MRI lumbar and thoracic spine w/o contrast noted: +inflammation in conus medullaris   -LP done: no evidence of infection, increased Proteins points toward an inflammatory process; cytology negative in CSF  -s/p Solumedrol IV 1gm daily x 5days, with transient initial  improvement in paresthesia and motor function   - c/w  IVIG x 5days, CRP 3 --> 7 , ESR 21   -neurology follow up  -c/w Prednisone 40mg/day and taper by 5mg a week     prediabetes with A1C 5.8 , steroids induced - diet , o/p monitoring     vitamin D  deficiency , low borderline B12 - replace both    Constipation- MIralax, Dulcolax     HTN - c/w ACEinh    H/o chemotherapy induced peripheral neuropathy; chronic L foot drop - c/w B12 supplements , PT     h/o Colon CA, h/o LLE sarcoma, h/o renal CA :   Small renal Ca : scheduled for cryoablation at [facility] [place]    Dispo - IV IG , PT daily

## 2022-08-15 ENCOUNTER — APPOINTMENT (OUTPATIENT)
Dept: NEUROSURGERY | Facility: CLINIC | Age: 47
End: 2022-08-15

## 2022-08-15 LAB
ALBUMIN SERPL ELPH-MCNC: 3.2 G/DL — LOW (ref 3.3–5)
ALP SERPL-CCNC: 68 U/L — SIGNIFICANT CHANGE UP (ref 40–120)
ALT FLD-CCNC: 48 U/L — SIGNIFICANT CHANGE UP (ref 12–78)
ANION GAP SERPL CALC-SCNC: 5 MMOL/L — SIGNIFICANT CHANGE UP (ref 5–17)
AST SERPL-CCNC: 20 U/L — SIGNIFICANT CHANGE UP (ref 15–37)
BILIRUB SERPL-MCNC: 0.7 MG/DL — SIGNIFICANT CHANGE UP (ref 0.2–1.2)
BUN SERPL-MCNC: 20 MG/DL — SIGNIFICANT CHANGE UP (ref 7–23)
CALCIUM SERPL-MCNC: 8.6 MG/DL — SIGNIFICANT CHANGE UP (ref 8.5–10.1)
CHLORIDE SERPL-SCNC: 107 MMOL/L — SIGNIFICANT CHANGE UP (ref 96–108)
CO2 SERPL-SCNC: 26 MMOL/L — SIGNIFICANT CHANGE UP (ref 22–31)
CREAT SERPL-MCNC: 0.89 MG/DL — SIGNIFICANT CHANGE UP (ref 0.5–1.3)
EGFR: 105 ML/MIN/1.73M2 — SIGNIFICANT CHANGE UP
GLUCOSE SERPL-MCNC: 109 MG/DL — HIGH (ref 70–99)
HCT VFR BLD CALC: 43 % — SIGNIFICANT CHANGE UP (ref 39–50)
HGB BLD-MCNC: 14.8 G/DL — SIGNIFICANT CHANGE UP (ref 13–17)
MCHC RBC-ENTMCNC: 29.2 PG — SIGNIFICANT CHANGE UP (ref 27–34)
MCHC RBC-ENTMCNC: 34.4 GM/DL — SIGNIFICANT CHANGE UP (ref 32–36)
MCV RBC AUTO: 84.8 FL — SIGNIFICANT CHANGE UP (ref 80–100)
PLATELET # BLD AUTO: 109 K/UL — LOW (ref 150–400)
POTASSIUM SERPL-MCNC: 3.6 MMOL/L — SIGNIFICANT CHANGE UP (ref 3.5–5.3)
POTASSIUM SERPL-SCNC: 3.6 MMOL/L — SIGNIFICANT CHANGE UP (ref 3.5–5.3)
PROT SERPL-MCNC: 8.6 GM/DL — HIGH (ref 6–8.3)
RBC # BLD: 5.07 M/UL — SIGNIFICANT CHANGE UP (ref 4.2–5.8)
RBC # FLD: 12.5 % — SIGNIFICANT CHANGE UP (ref 10.3–14.5)
SODIUM SERPL-SCNC: 138 MMOL/L — SIGNIFICANT CHANGE UP (ref 135–145)
WBC # BLD: 6.73 K/UL — SIGNIFICANT CHANGE UP (ref 3.8–10.5)
WBC # FLD AUTO: 6.73 K/UL — SIGNIFICANT CHANGE UP (ref 3.8–10.5)

## 2022-08-15 PROCEDURE — 99232 SBSQ HOSP IP/OBS MODERATE 35: CPT

## 2022-08-15 PROCEDURE — 99233 SBSQ HOSP IP/OBS HIGH 50: CPT

## 2022-08-15 RX ORDER — MAGNESIUM HYDROXIDE 400 MG/1
30 TABLET, CHEWABLE ORAL DAILY
Refills: 0 | Status: DISCONTINUED | OUTPATIENT
Start: 2022-08-15 | End: 2022-08-15

## 2022-08-15 RX ORDER — MAGNESIUM HYDROXIDE 400 MG/1
30 TABLET, CHEWABLE ORAL DAILY
Refills: 0 | Status: DISCONTINUED | OUTPATIENT
Start: 2022-08-15 | End: 2022-08-16

## 2022-08-15 RX ORDER — MAGNESIUM HYDROXIDE 400 MG/1
30 TABLET, CHEWABLE ORAL ONCE
Refills: 0 | Status: DISCONTINUED | OUTPATIENT
Start: 2022-08-15 | End: 2022-08-15

## 2022-08-15 RX ORDER — MULTIVIT WITH MIN/MFOLATE/K2 340-15/3 G
1 POWDER (GRAM) ORAL ONCE
Refills: 0 | Status: COMPLETED | OUTPATIENT
Start: 2022-08-15 | End: 2022-08-15

## 2022-08-15 RX ADMIN — PREGABALIN 1000 MICROGRAM(S): 225 CAPSULE ORAL at 09:34

## 2022-08-15 RX ADMIN — POLYETHYLENE GLYCOL 3350 17 GRAM(S): 17 POWDER, FOR SOLUTION ORAL at 09:34

## 2022-08-15 RX ADMIN — MAGNESIUM HYDROXIDE 30 MILLILITER(S): 400 TABLET, CHEWABLE ORAL at 19:58

## 2022-08-15 RX ADMIN — Medication 1 BOTTLE: at 19:59

## 2022-08-15 RX ADMIN — IMMUNE GLOBULIN (HUMAN) 137.5 GRAM(S): 10 INJECTION INTRAVENOUS; SUBCUTANEOUS at 10:49

## 2022-08-15 RX ADMIN — Medication 2000 UNIT(S): at 09:34

## 2022-08-15 RX ADMIN — LISINOPRIL 20 MILLIGRAM(S): 2.5 TABLET ORAL at 09:33

## 2022-08-15 RX ADMIN — Medication 650 MILLIGRAM(S): at 11:08

## 2022-08-15 RX ADMIN — POLYETHYLENE GLYCOL 3350 17 GRAM(S): 17 POWDER, FOR SOLUTION ORAL at 21:58

## 2022-08-15 RX ADMIN — PANTOPRAZOLE SODIUM 40 MILLIGRAM(S): 20 TABLET, DELAYED RELEASE ORAL at 09:32

## 2022-08-15 RX ADMIN — Medication 650 MILLIGRAM(S): at 11:38

## 2022-08-15 RX ADMIN — Medication 40 MILLIGRAM(S): at 09:32

## 2022-08-15 RX ADMIN — Medication 3 MILLIGRAM(S): at 21:57

## 2022-08-15 RX ADMIN — Medication 5 MILLIGRAM(S): at 09:33

## 2022-08-15 RX ADMIN — Medication 25 MILLIGRAM(S): at 11:08

## 2022-08-15 NOTE — PROGRESS NOTE ADULT - ASSESSMENT
47 year old obese man with a PMHx of HTN, rectal cancer s/p res/chemo/RT 4 years ago,   S/P sarcoma resection from LLE - peripheral neuropathy and lumbar DJD, chronic left foot drop that evolved x past year, was recently diagnosed with renal CA, has had paresthesias left leg x a year, has been seen by number of specialists, lumbar surgery was being contemplated, he presented to  ED on 8/2 with complaint of worsening RLE paresthesias/weakness x 3 weeks and saddle anesthesia x 4 days, had an episode of bowel incontinence 4 days prior to admission. He had initial improvement in his symptoms after steroids but worsened as OP.    Testing including NCV performed at [place], c/w Axonal neuropathy, as per discussion with neuromuscular specialists [facility], possible severe demyelinating neuropathy which has evolved to axonal loss.    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 Good response with  IVIG.    Recommend:  1. IVIg 55 grams/daily x 5 days, plan for D/C home on 8/16.  2. Taper oral steroid to 20 mg x 4 days, then 10 mgs x 4 days then 5 mgs  3. Physical therapy as OP    F/U with me in 3-4 weeks    Above D/W pt and Dr. Canales

## 2022-08-15 NOTE — PROGRESS NOTE ADULT - ASSESSMENT
47 year old obese man with a PMHx of colorectal cancer s/p resection and chemotherapy 4 years ago, peripheral neuropathy, chronic left foot drop for more than 1yr, Htn,  recently diagnosed renal Ca awaiting cryoablation tx at [facility] [place], [place]  admitted at  last week and treated with pulse dose steroids x 5days for a demyelinating process involving his lumbar spine, returns now to the ED, sent by his neurologist for worsening symptoms of LLE paresthesia and weakness, unable to walk w/o cane.      Lumbar spine acute demyelinating disease affecting conus medullaris with left foot drop and saddle anaesthesia    -r/o TM vs other type of demyelinating process  -no evidence of leptomeningeal spread  : cytology negative in CSF  -MRI lumbar and thoracic spine w/o contrast noted: +inflammation in conus medullaris   -LP done: no evidence of infection, increased Proteins points toward an inflammatory process; cytology negative in CSF  -s/p Solumedrol IV 1gm daily x 5days, with transient initial  improvement in paresthesia and motor function   - c/w  IVIG x 5days, CRP 3 --> 7 , ESR 21   -neurology follow up  -c/w Prednisone 40mg/day and taper by 5mg a week     prediabetes with A1C 5.8 , steroids induced - diet , o/p monitoring     vitamin D  deficiency , low borderline B12 - replace both    Constipation- MIralax, Dulcolax     HTN - c/w ACEinh    H/o chemotherapy induced peripheral neuropathy; chronic L foot drop - c/w B12 supplements , PT     h/o Colon CA, h/o LLE sarcoma, h/o renal CA :   Small renal Ca : scheduled for cryoablation at [facility] [place]    Dispo - IV IG , PT daily      47 year old obese man with a PMHx of colorectal cancer s/p resection and chemotherapy 4 years ago, peripheral neuropathy, chronic left foot drop for more than 1yr, Htn,  recently diagnosed renal Ca awaiting cryoablation tx at [facility] [place], [place]  admitted at  last week and treated with pulse dose steroids x 5days for a demyelinating process involving his lumbar spine, returns now to the ED, sent by his neurologist for worsening symptoms of LLE paresthesia and weakness, unable to walk w/o cane.      Lumbar spine acute demyelinating disease affecting conus medullaris with left foot drop and saddle anaesthesia    -r/o TM vs other type of demyelinating process  -no evidence of leptomeningeal spread  : cytology negative in CSF  -MRI lumbar and thoracic spine w/o contrast noted: +inflammation in conus medullaris   -LP done: no evidence of infection, increased Proteins points toward an inflammatory process; cytology negative in CSF  -s/p Solumedrol IV 1gm daily x 5days, with transient initial  improvement in paresthesia and motor function   - c/w  IVIG x 5days, CRP 3 --> 7 , ESR 21   -neurology follow up  -c/w Prednisone 40mg/day and taper by 5mg a week     Thrombocytopenia - monitor while on lovenox    prediabetes with A1C 5.8 , steroids induced - diet , o/p monitoring     vitamin D  deficiency , low borderline B12 - replace both    Constipation- MIralax, Dulcolax , add MOM and mg citrate    HTN - c/w ACEinh    H/o chemotherapy induced peripheral neuropathy; chronic L foot drop - c/w B12 supplements , PT     h/o Colon CA, h/o LLE sarcoma, h/o renal CA :   Small renal Ca : scheduled for cryoablation at [facility] [place]    Dispo - IV IG , PT daily, d/c jeffrey

## 2022-08-16 ENCOUNTER — TRANSCRIPTION ENCOUNTER (OUTPATIENT)
Age: 47
End: 2022-08-16

## 2022-08-16 VITALS
OXYGEN SATURATION: 98 % | HEART RATE: 65 BPM | TEMPERATURE: 98 F | RESPIRATION RATE: 18 BRPM | SYSTOLIC BLOOD PRESSURE: 151 MMHG | DIASTOLIC BLOOD PRESSURE: 94 MMHG

## 2022-08-16 LAB
ANION GAP SERPL CALC-SCNC: 1 MMOL/L — LOW (ref 5–17)
BUN SERPL-MCNC: 20 MG/DL — SIGNIFICANT CHANGE UP (ref 7–23)
CALCIUM SERPL-MCNC: 8.3 MG/DL — LOW (ref 8.5–10.1)
CHLORIDE SERPL-SCNC: 107 MMOL/L — SIGNIFICANT CHANGE UP (ref 96–108)
CO2 SERPL-SCNC: 30 MMOL/L — SIGNIFICANT CHANGE UP (ref 22–31)
CREAT SERPL-MCNC: 0.92 MG/DL — SIGNIFICANT CHANGE UP (ref 0.5–1.3)
CRP SERPL-MCNC: 3 MG/L — SIGNIFICANT CHANGE UP
EGFR: 102 ML/MIN/1.73M2 — SIGNIFICANT CHANGE UP
ERYTHROCYTE [SEDIMENTATION RATE] IN BLOOD: 25 MM/HR — HIGH (ref 0–15)
GLUCOSE SERPL-MCNC: 115 MG/DL — HIGH (ref 70–99)
HCT VFR BLD CALC: 40.5 % — SIGNIFICANT CHANGE UP (ref 39–50)
HGB BLD-MCNC: 13.4 G/DL — SIGNIFICANT CHANGE UP (ref 13–17)
MAGNESIUM SERPL-MCNC: 2.5 MG/DL — SIGNIFICANT CHANGE UP (ref 1.6–2.6)
MCHC RBC-ENTMCNC: 28.5 PG — SIGNIFICANT CHANGE UP (ref 27–34)
MCHC RBC-ENTMCNC: 33.1 GM/DL — SIGNIFICANT CHANGE UP (ref 32–36)
MCV RBC AUTO: 86 FL — SIGNIFICANT CHANGE UP (ref 80–100)
PHOSPHATE SERPL-MCNC: 2.8 MG/DL — SIGNIFICANT CHANGE UP (ref 2.5–4.5)
PLATELET # BLD AUTO: 109 K/UL — LOW (ref 150–400)
POTASSIUM SERPL-MCNC: 3.8 MMOL/L — SIGNIFICANT CHANGE UP (ref 3.5–5.3)
POTASSIUM SERPL-SCNC: 3.8 MMOL/L — SIGNIFICANT CHANGE UP (ref 3.5–5.3)
RBC # BLD: 4.71 M/UL — SIGNIFICANT CHANGE UP (ref 4.2–5.8)
RBC # FLD: 12.6 % — SIGNIFICANT CHANGE UP (ref 10.3–14.5)
SODIUM SERPL-SCNC: 138 MMOL/L — SIGNIFICANT CHANGE UP (ref 135–145)
WBC # BLD: 6.86 K/UL — SIGNIFICANT CHANGE UP (ref 3.8–10.5)
WBC # FLD AUTO: 6.86 K/UL — SIGNIFICANT CHANGE UP (ref 3.8–10.5)

## 2022-08-16 PROCEDURE — 99239 HOSP IP/OBS DSCHRG MGMT >30: CPT

## 2022-08-16 PROCEDURE — 99232 SBSQ HOSP IP/OBS MODERATE 35: CPT

## 2022-08-16 RX ORDER — LISINOPRIL/HYDROCHLOROTHIAZIDE 10-12.5 MG
1 TABLET ORAL
Qty: 30 | Refills: 0
Start: 2022-08-16 | End: 2022-09-14

## 2022-08-16 RX ORDER — AMLODIPINE BESYLATE 2.5 MG/1
2.5 TABLET ORAL DAILY
Refills: 0 | Status: DISCONTINUED | OUTPATIENT
Start: 2022-08-16 | End: 2022-08-16

## 2022-08-16 RX ORDER — CHOLECALCIFEROL (VITAMIN D3) 125 MCG
2 CAPSULE ORAL
Qty: 60 | Refills: 0
Start: 2022-08-16 | End: 2022-09-14

## 2022-08-16 RX ORDER — LISINOPRIL/HYDROCHLOROTHIAZIDE 10-12.5 MG
1 TABLET ORAL
Qty: 0 | Refills: 0 | DISCHARGE

## 2022-08-16 RX ORDER — AMLODIPINE BESYLATE 2.5 MG/1
1 TABLET ORAL
Qty: 30 | Refills: 0
Start: 2022-08-16 | End: 2022-09-14

## 2022-08-16 RX ADMIN — Medication 650 MILLIGRAM(S): at 08:50

## 2022-08-16 RX ADMIN — PREGABALIN 1000 MICROGRAM(S): 225 CAPSULE ORAL at 10:09

## 2022-08-16 RX ADMIN — IMMUNE GLOBULIN (HUMAN) 137.5 GRAM(S): 10 INJECTION INTRAVENOUS; SUBCUTANEOUS at 10:02

## 2022-08-16 RX ADMIN — Medication 40 MILLIGRAM(S): at 10:09

## 2022-08-16 RX ADMIN — LISINOPRIL 20 MILLIGRAM(S): 2.5 TABLET ORAL at 10:09

## 2022-08-16 RX ADMIN — Medication 2000 UNIT(S): at 10:08

## 2022-08-16 RX ADMIN — AMLODIPINE BESYLATE 2.5 MILLIGRAM(S): 2.5 TABLET ORAL at 11:36

## 2022-08-16 RX ADMIN — PANTOPRAZOLE SODIUM 40 MILLIGRAM(S): 20 TABLET, DELAYED RELEASE ORAL at 10:09

## 2022-08-16 RX ADMIN — POLYETHYLENE GLYCOL 3350 17 GRAM(S): 17 POWDER, FOR SOLUTION ORAL at 10:11

## 2022-08-16 RX ADMIN — Medication 25 MILLIGRAM(S): at 08:50

## 2022-08-16 NOTE — DISCHARGE NOTE PROVIDER - CARE PROVIDER_API CALL
Anthony Ratliff)  Internal Medicine  [address] [facility] [address], [id number]  [place] [address]  Phone: [phone]  Fax: [phone]  Follow Up Time: 1 week    Nahed Wiseman)  Neurology  [address] [facility], [address]  [place] [address]  Phone: [phone]  Fax: [phone]  Follow Up Time: 2 weeks    GUANACO TAY  Family Medicine  [address] [place] [address]  Phone: [phone]  Fax: [phone]  Follow Up Time: 1-3 days

## 2022-08-16 NOTE — DISCHARGE NOTE PROVIDER - HOSPITAL COURSE
cc - left foot drop , numbness      47 year old obese man with a PMHx of colorectal cancer s/p resection and chemotherapy 4 years ago, peripheral neuropathy, chronic left foot drop for more than 1yr, Htn,  recently diagnosed renal Ca awaiting cryoablation tx at [facility] [place], [place]  admitted at  last week and treated with pulse dose steroids x 5days for a demyelinating process involving his lumbar spine, returns now to the ED, sent by his neurologist for worsening symptoms of LLE paresthesia and weakness, unable to walk w/o cane.  Last week  he had an MRI, LP which revealed an inflammatory process; Nerve conduction studies were performed as outpatient this week and revealed demyelinating disease.    On his last admission his symptoms have improved after 5 days of steroids and was able to walk unassisted; now he states that his numbness and weakness on his legs L>R have worsened after discharge and he is back to using his cane. He has had a chronic Left foot drop for >1yr; he now states that there is worsening in his foot drop. On his Rt foot there is no motor deficits but there is paresthesia from below the knee. He also describes saddle anesthesia w/o bowel incontinence.     8/13 - pt seen and examined in am,  left foot drop persist, numbness in the leg , paresthesia in buttocks,  perineum, hard to push urine out, denies constipation, plan discussed  8/14 - pt seen and examined, reports improvement of the left foot drop, + constipation, afebrile, denies cp, dyspnea, abd pain  8/15 - pt seen and examined, left foot drop improving, + constipation , denies abdominal pain, plan discussed   8/16 - pt seen and examined, left foot drop worse today, having multiple BM after laxatives yesterday, denies cp, dyspnea, abdominal pain, plan for discharge explained     Review of system- Rest of the review of system are negative except mentioned in HPI  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  Rectal cancer, completed chemotherapy  Status post proctocolectomy  with temporary Ileostomy- reversed 2019  Peripheral neuropathy  port cath insertion 08/2018           Vitals reviewed for last 24 hours  T(C): 36.7 (08-16-22 @ 13:05), Max: 36.8 (08-15-22 @ 15:53)  T(F): 98 (08-16-22 @ 13:05), Max: 98.3 (08-15-22 @ 15:53)  HR: 78 (08-16-22 @ 13:05) (67 - 79)  BP: 162/89 (08-16-22 @ 13:05) (114/91 - 162/89)  RR: 18 (08-16-22 @ 13:05) (17 - 18)  SpO2: 99% (08-16-22 @ 13:05) (95% - 99%)  Wt(kg): --    PHYSICAL EXAM:    Constitutional: NAD, awake and alert   HEENT: PERR, EOMI, Normal Hearing, MMM  Neck: Soft and supple, No LAD, No JVD  Respiratory: Breath sounds are clear bilaterally, No wheezing, rales or rhonchi  Cardiovascular: S1 and S2, regular rate and rhythm, no Murmurs, gallops or rubs  Gastrointestinal: Bowel Sounds present, soft, nontender, nondistended, no guarding, no rebound  Extremities: No peripheral edema  Vascular: 2+ peripheral pulses  Neurological: A/O x 3,  No Aphasia or paraphasic errors. Naming /repetition intact   CN: FRANCISCO, EOMI, VFF, facial sensation normal, no NLFD, tongue midline, Palate moves equally, SCM equal bilaterally  Motor: No pronator drift, Strength 5/5 in BUE and RLE 5/5, Left  HF 5-/5, KE 5/5, KF 5-/5, DF 0/5, PF 0/5  Sens: decreased sensation below the knee in both lower extremities, left worse than right  Reflexes: UEs  1+ in biceps, radialis, absent  patellar on right, absent patellars and ankle jerks, downgoing toes b/l, negative clonus  Coord:  No FNFA, Gait: using cane, unsteady  Skin: No rashes  rectal : deferred, not indicated    Labs all reviewed  · Assessment     47 year old obese man with a PMHx of colorectal cancer s/p resection and chemotherapy 4 years ago, peripheral neuropathy, chronic left foot drop for more than 1yr, Htn,  recently diagnosed renal Ca awaiting cryoablation tx at [facility] [place], [place]  admitted at  last week and treated with pulse dose steroids x 5days for a demyelinating process involving his lumbar spine, returns now to the ED, sent by his neurologist for worsening symptoms of LLE paresthesia and weakness, unable to walk w/o cane.    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 with left foot drop and saddle anaesthesia    -r/o TM vs other type of demyelinating process  -no evidence of leptomeningeal spread  : cytology negative in CSF  -MRI lumbar and thoracic spine w/o contrast noted: +inflammation in conus medullaris   -LP done: no evidence of infection, increased Proteins points toward an inflammatory process; cytology negative in CSF  -s/p Solumedrol IV 1gm daily x 5days, with transient initial  improvement in paresthesia and motor function   - s/p  IVIG x 5days, CRP 3 --> 7 --> 3 , ESR 21  --> 25  -neurology follow up  -c/w Prednisone 40mg/day and taper by 5mg a week   - taper as per neurology Taper oral steroid to 20 mg x 4 days, then 10 mg x 4 days then 5 mg  Thrombocytopenia - monitor while on Lovenox  prediabetes with A1C 5.8 , steroids induced - diet , o/p monitoring   vitamin D  deficiency , low borderline B12 - replace both  Constipation- MIralax, Dulcolax , add MOM and mg citrate  HTN - c/w ACEinh  H/o chemotherapy induced peripheral neuropathy; chronic L foot drop - c/w B12 supplements , PT   h/o Colon CA, h/o LLE sarcoma, h/o renal CA :   Small renal Ca : scheduled for cryoablation at [facility] [place]  Final diagnosis, treatment plan, and follow-up recommendations were discussed and explained to the patient.   The patient was given an opportunity to ask questions concerning the diagnosis and treatment plan.   The patient acknowledged understanding of the diagnosis, treatment, and follow-up recommendations.   The patient was advised to seek urgent care upon discharge if worsening symptoms develop prior to scheduled follow-up.   Time spent on discharge included time with the patient, and also coordinating discharge care as outlined below.  Discharge note faxed to PCP with my contact information to call me back   PCP Dr. Calixto   Total time spent: 50 min

## 2022-08-16 NOTE — DISCHARGE NOTE PROVIDER - NSDCMRMEDTOKEN_GEN_ALL_CORE_FT
amLODIPine 2.5 mg oral tablet: 1 tab(s) orally once a day (at bedtime)   bisacodyl 5 mg oral delayed release tablet: 1 tab(s) orally every 12 hours, As Needed -for constipation   cyanocobalamin 1000 mcg oral tablet: 1 tab(s) orally once a day  lisinopril-hydrochlorothiazide 20 mg-25 mg oral tablet: 1 tab(s) orally once a day  Metamucil 3.4 g/3.7 g oral powder for reconstitution: 3 scoop(s) orally 2 times a day  MiraLax oral powder for reconstitution: 17 gram(s) orally 2 times a day, As Needed  pantoprazole 40 mg oral delayed release tablet: 1 tab(s) orally once a day (after a meal)   predniSONE 5 mg oral tablet: 4 tab(s) orally once a day for 4 days than   2 tabs orally once daily for 4 days than   1 tab once daily until you see neurologist  Vitamin D3 50 mcg (2000 intl units) oral tablet: 2 tab(s) orally once a day

## 2022-08-16 NOTE — DIETITIAN INITIAL EVALUATION ADULT - OTHER INFO
47 year old obese man with a PMHx of colorectal cancer s/p resection and chemotherapy 4 years ago, peripheral neuropathy, chronic left foot drop for more than 1yr, Htn,  recently diagnosed renal Ca [place] [facility] [place] at  last week and treated with pulse dose steroids x 5days for a demyelinating process involving his lumbar spine, returns now to the ED, sent by his neurologist for worsening symptoms of LLE paresthesia and weakness, unable to walk w/o cane. Pt reports UBW- 310# current bed scale taken by RD on 08/16- 301# indicating a 3% significant loss x ~1 week. NFPE- no muscle/fat wasting at this time. Diet instruction provided on low sodium diet. RD contact information given for any further questions or concerns. Will continue to monitor and follow up prn. See below for recommendations. Criteria met for moderate malnutrition.

## 2022-08-16 NOTE — PROGRESS NOTE ADULT - ASSESSMENT
47 year old obese man with a PMHx of HTN, rectal cancer s/p res/chemo/RT 4 years ago,   S/P sarcoma resection from LLE - peripheral neuropathy and lumbar DJD, chronic left foot drop that evolved x past year, was recently diagnosed with renal CA, has had paresthesias left leg x a year, has been seen by number of specialists, lumbar surgery was being contemplated, he presented to  ED on 8/2 with complaint of worsening RLE paresthesias/weakness x 3 weeks and saddle anesthesia x 4 days, had an episode of bowel incontinence 4 days prior to admission. He had initial improvement in his symptoms after steroids but worsened as OP.    Testing including NCV performed at [place], c/w Axonal neuropathy, as per discussion with neuromuscular specialists [facility], possible severe demyelinating neuropathy which has evolved to axonal loss.    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 Fair to good response with  IVIG, initially remarkable improvement - now platued, no adverse affects    Recommend:  1. Taper oral steroid to 20 mg x 4 days, then 10 mgs   2. Physical therapy as OP    Had a detailed D/W pt and his parents who were presents, i explained to the patient that he may notice max benefits of IVIG in 10-15 days    Advised F/U MRI's in 2 weeks, F/U with me in 3-4 weeks    Pt is f/u with his oncologist on thursday    Above D/W Dr. Canales

## 2022-08-16 NOTE — DIETITIAN INITIAL EVALUATION ADULT - NSFNSGIASSESSMENTFT_GEN_A_CORE
BM- 08/16 (bowel meds: psyllium Powder 2 Packet(s) Oral two times a day, polyethylene glycol 3350 17 Gram(s) Oral two times a day, bisacodyl 5 milliGRAM(s) Oral every 12 hours, magnesium hydroxide Suspension 30 milliLiter(s) Oral daily)

## 2022-08-16 NOTE — DIETITIAN NUTRITION RISK NOTIFICATION - ADDITIONAL COMMENTS/DIETITIAN RECOMMENDATIONS
Additional Recommendations  1) Continue with current therapeutic diet and encourage protein enriched foods with all meals. 2) Monitor weights and track trends 3) Monitor lytes and hydration replete prn 4) Monitor BM if none x > 3 days increase bowel meds 5) MVI w/ minerals daily to meet RDI's

## 2022-08-16 NOTE — PROGRESS NOTE ADULT - SUBJECTIVE AND OBJECTIVE BOX
HPI:  No complaints    Vital Signs Last 24 Hrs  T(C): 36.8 (14 Aug 2022 12:00), Max: 36.9 (13 Aug 2022 18:26)  T(F): 98.3 (14 Aug 2022 12:00), Max: 98.4 (13 Aug 2022 18:26)  HR: 74 (14 Aug 2022 12:00) (65 - 82)  BP: 139/76 (14 Aug 2022 12:00) (117/78 - 152/93)  BP(mean): --  RR: 18 (14 Aug 2022 12:00) (16 - 18)  SpO2: 98% (14 Aug 2022 12:00) (97% - 100%)    Parameters below as of 14 Aug 2022 12:00  Patient On (Oxygen Delivery Method): room air    MEDICATIONS  (STANDING):  acetaminophen     Tablet .. 650 milliGRAM(s) Oral daily  bisacodyl 5 milliGRAM(s) Oral every 12 hours  cholecalciferol 2000 Unit(s) Oral daily  cyanocobalamin 1000 MICROGram(s) Oral daily  diphenhydrAMINE 25 milliGRAM(s) Oral daily  enoxaparin Injectable 40 milliGRAM(s) SubCutaneous every 24 hours  immune   globulin 10% (GAMMAGARD) IVPB 55 Gram(s) IV Intermittent daily  lisinopril 20 milliGRAM(s) Oral daily  pantoprazole    Tablet 40 milliGRAM(s) Oral before breakfast  polyethylene glycol 3350 17 Gram(s) Oral two times a day  predniSONE   Tablet 40 milliGRAM(s) Oral daily  psyllium Powder 2 Packet(s) Oral two times a day    MEDICATIONS  (PRN):  aluminum hydroxide/magnesium hydroxide/simethicone Suspension 30 milliLiter(s) Oral every 4 hours PRN Dyspepsia  melatonin 3 milliGRAM(s) Oral at bedtime PRN Insomnia  ondansetron Injectable 4 milliGRAM(s) IV Push every 6 hours PRN Nausea and/or Vomiting      ROS: pertinent positives in HPI, all other ROS were reviewed and are negative     Neuro  HF: A x O x 3. Appropriately interactive, normal affect. Speech fluent, No Aphasia or paraphasic errors. Naming /repetition intact   CN: FRANCISCO, EOMI, VFF, facial sensation normal, no NLFD, tongue midline, Palate moves equally, SCM equal bilaterally  Motor: No pronator drift, Strength 5/5 in BUE and RLE 5/5, Left  HF 5-/5, KE 5/5, KF 5-/5, DF 0/5, PF 0/5  Sens: decreased sensation below the knee in both lower extremities, left worse than right  Reflexes: UEs  1+ in biceps, radialis, absent  patellar on right, absent patellars and ankle jerks, downgoing toes b/l, negative clonus  Coord:  No FNFA  Gait: using cane, unsteady    LABS:                         13.9   6.19  )-----------( 111      ( 14 Aug 2022 06:28 )             41.3     08-14    137  |  105  |  18  ----------------------------<  120<H>  3.8   |  30  |  0.82    Ca    8.8      14 Aug 2022 06:28    TPro  7.7  /  Alb  3.1<L>  /  TBili  0.7  /  DBili  x   /  AST  16  /  ALT  54  /  AlkPhos  83  08-14    LIVER FUNCTIONS - ( 14 Aug 2022 06:28 )  Alb: 3.1 g/dL / Pro: 7.7 gm/dL / ALK PHOS: 83 U/L / ALT: 54 U/L / AST: 16 U/L / GGT: x             A/P:  47 year old man admitted for course of IVIG to treat suspected inflammatory lesion affecting conus medullaris.  Tolerating IVIG well.    Recommend:  1. IVIg 55 grams/daily x 5 days.  2. He can continue his oral steroid taper.  3. Physical therapy  4. DVT prophylaxis.    Tray Cassidy MD  Neurology Attending Physician      
cc - left foot drop , numbness      47 year old obese man with a PMHx of colorectal cancer s/p resection and chemotherapy 4 years ago, peripheral neuropathy, chronic left foot drop for more than 1yr, Htn,  recently diagnosed renal Ca [place] [facility] [place]  admitted at  last week and treated with pulse dose steroids x 5days for a demyelinating process involving his lumbar spine, returns now to the ED, sent by his neurologist for worsening symptoms of LLE paresthesia and weakness, unable to walk w/o cane.  Last week  he had an MRI, LP which revealed an inflammatory process; Nerve conduction studies were performed as outpatient this week and revealed demyelinating disease.    On his last admission his symptoms have improved after 5 days of steroids and was able to walk unassisted; now he states that his numbness and weakness on his legs L>R have worsened after discharge and he is back to using his cane. He has had a chronic Left foot drop for >1yr; he now states that there is worsening in his foot drop. On his Rt foot there is no motor deficits but there is paresthesia from below the knee. He also describes saddle anesthesia w/o bowel incontinence.      - pt seen and examined in am,  left foot drop persist, numbness in the leg , paresthesia in buttocks,  perineum, hard to push urine out, denies constipation, plan discussed    Review of system- Rest of the review of system are negative except mentioned in HPI       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  Rectal cancer, completed chemotherapy  Status post proctocolectomy  with temporary Ileostomy- reversed   Peripheral neuropathy  port cath insertion 2018           Vitals reviewed for last 24 hours  T(C): 36.6 (22 @ 16:32), Max: 37 (22 @ 21:30)  T(F): 97.9 (22 @ 16:32), Max: 98.6 (22 @ 21:30)  HR: 82 (22 @ 16:32) (77 - 90)  BP: 136/87 (22 @ 16:32) (117/78 - 136/87)  RR: 18 (22 @ 16:32) (16 - 18)  SpO2: 100% (22 @ 16:32) (97% - 100%)  Wt(kg): --    PHYSICAL EXAM:    Constitutional: NAD, awake and alert   HEENT: PERR, EOMI, Normal Hearing, MMM  Neck: Soft and supple, No LAD, No JVD  Respiratory: Breath sounds are clear bilaterally, No wheezing, rales or rhonchi  Cardiovascular: S1 and S2, regular rate and rhythm, no Murmurs, gallops or rubs  Gastrointestinal: Bowel Sounds present, soft, nontender, nondistended, no guarding, no rebound  Extremities: No peripheral edema  Vascular: 2+ peripheral pulses  Neurological: A/O x 3,  No Aphasia or paraphasic errors. Naming /repetition intact   CN: FRANCISCO, EOMI, VFF, facial sensation normal, no NLFD, tongue midline, Palate moves equally, SCM equal bilaterally  Motor: No pronator drift, Strength 5/5 in BUE and RLE 5/5, Left  HF 5-/5, KE 5/5, KF 5-/5, DF 0/5, PF 0/5  Sens: decreased sensation below the knee in both lower extremities, left worse than right  Reflexes: UEs  1+ in biceps, radialis, absent  patellar on right, absent patellars and ankle jerks, downgoing toes b/l, negative clonus  Coord:  No FNFA, Gait: using cane, unsteady  Skin: No rashes  rectal : deferred, not indicated    Labs all reviewed      137  |  105  |  20  ----------------------------<  130<H>  3.7   |  26  |  0.90    Ca    8.9      12 Aug 2022 09:40    TPro  6.6  /  Alb  3.4  /  TBili  1.6<H>  /  DBili  x   /  AST  17  /  ALT  88<H>  /  AlkPhos  65                          15.9   9.08  )-----------( 148      ( 12 Aug 2022 09:40 )             46.4       LIVER FUNCTIONS - ( 12 Aug 2022 09:40 )  Alb: 3.4 g/dL / Pro: 6.6 gm/dL / ALK PHOS: 65 U/L / ALT: 88 U/L / AST: 17 U/L / GGT: x             PT/INR - ( 12 Aug 2022 09:40 )   PT: 12.7 sec;   INR: 1.09 ratio         PTT - ( 12 Aug 2022 09:40 )  PTT:28.2 sec    Urinalysis Basic - ( 12 Aug 2022 09:39 )    Color: Yellow / Appearance: Clear / S.010 / pH: x  Gluc: x / Ketone: Negative  / Bili: Negative / Urobili: Negative   Blood: x / Protein: Negative / Nitrite: Negative   Leuk Esterase: Negative / RBC: x / WBC x   Sq Epi: x / Non Sq Epi: x / Bacteria: x    Babesia microti PCR, Blood. (22 @ 15:39)    Culture Results:   Babesia microti PCR  Results: NOT detected  ***************Result Note*************  The detection of Babesia microti by PCR has only been  validated for whole blood; this test has not been approved  by the US Food and Drug Administration (FDA). Performance  characteristics of this assay have been determined by  [employer]. The clinical significance  of results should be considered in conjunction with the  overall clinical presentation of the patient. Result is not  intended to be used as the sole means for clinical diagnosis  or patient management decisions.  One negative sample does not necessarily rule  out the presence of a parasitic infection.    Vitamin B12, Serum in AM (22 @ 06:48)    Vitamin B12, Serum: 393 pg/mL    Thyroid Stimulating Hormone, Serum in AM (22 @ 06:48)    Thyroid Stimulating Hormone, Serum: 0.98 uU/mL    Creatine Kinase, Serum (22 @ 07:08)    Creatine Kinase, Serum: 295 U/L     MR Cervical Spine w/wo IV Cont (22 @ 18:33) >  IMPRESSION:  1.  Poorly marginated T2 hyperintense enhancing region in the conus,   similar in appearance to prior study. Differential diagnosis includes   inflammatory/demyelinating lesion versus neoplasm versus infection. Given   the results of the lumbar puncture, demyelinating/inflammatory lesion is   favored.  2.  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  3.  No osseous metastases in the spine.    < from: US Duplex Venous Lower Ext Complete, Bilateral (22 @ 07:38) >  No evidence of deep venous thrombosis in either lower extremity.    < end of copied text >            MEDICATIONS  (STANDING):  acetaminophen     Tablet .. 650 milliGRAM(s) Oral daily  cyanocobalamin 1000 MICROGram(s) Oral daily  diphenhydrAMINE 25 milliGRAM(s) Oral daily  enoxaparin Injectable 40 milliGRAM(s) SubCutaneous every 24 hours  immune   globulin 10% (GAMMAGARD) IVPB 55 Gram(s) IV Intermittent daily  lisinopril 20 milliGRAM(s) Oral daily  pantoprazole    Tablet 40 milliGRAM(s) Oral before breakfast  polyethylene glycol 3350 17 Gram(s) Oral two times a day  predniSONE   Tablet 40 milliGRAM(s) Oral daily  psyllium Powder 2 Packet(s) Oral two times a day    MEDICATIONS  (PRN):  aluminum hydroxide/magnesium hydroxide/simethicone Suspension 30 milliLiter(s) Oral every 4 hours PRN Dyspepsia  melatonin 3 milliGRAM(s) Oral at bedtime PRN Insomnia  ondansetron Injectable 4 milliGRAM(s) IV Push every 6 hours PRN Nausea and/or Vomiting    
cc - left foot drop , numbness      47 year old obese man with a PMHx of colorectal cancer s/p resection and chemotherapy 4 years ago, peripheral neuropathy, chronic left foot drop for more than 1yr, Htn,  recently diagnosed renal Ca awaiting cryoablation tx at [facility] [place], [place]  admitted at  last week and treated with pulse dose steroids x 5days for a demyelinating process involving his lumbar spine, returns now to the ED, sent by his neurologist for worsening symptoms of LLE paresthesia and weakness, unable to walk w/o cane.  Last week  he had an MRI, LP which revealed an inflammatory process; Nerve conduction studies were performed as outpatient this week and revealed demyelinating disease.    On his last admission his symptoms have improved after 5 days of steroids and was able to walk unassisted; now he states that his numbness and weakness on his legs L>R have worsened after discharge and he is back to using his cane. He has had a chronic Left foot drop for >1yr; he now states that there is worsening in his foot drop. On his Rt foot there is no motor deficits but there is paresthesia from below the knee. He also describes saddle anesthesia w/o bowel incontinence.      - pt seen and examined in am,  left foot drop persist, numbness in the leg , paresthesia in buttocks,  perineum, hard to push urine out, denies constipation, plan discussed   - pt seen and examined, reports improvement of the left foot drop, + constipation, afebrile, denies cp, dyspnea, abd pain    Review of system- Rest of the review of system are negative except mentioned in HPI       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  Rectal cancer, completed chemotherapy  Status post proctocolectomy  with temporary Ileostomy- reversed   Peripheral neuropathy  port cath insertion 2018           Vitals reviewed for last 24 hours  T(C): 36.6 (22 @ 15:08), Max: 36.9 (22 @ 18:26)  T(F): 97.8 (22 @ 15:08), Max: 98.4 (22 @ 18:26)  HR: 80 (22 @ 15:08) (65 - 82)  BP: 137/79 (22 @ 15:08) (122/79 - 152/93)  RR: 18 (22 @ 15:08) (16 - 18)  SpO2: 100% (22 @ 15:08) (97% - 100%)  Wt(kg): --    PHYSICAL EXAM:    Constitutional: NAD, awake and alert   HEENT: PERR, EOMI, Normal Hearing, MMM  Neck: Soft and supple, No LAD, No JVD  Respiratory: Breath sounds are clear bilaterally, No wheezing, rales or rhonchi  Cardiovascular: S1 and S2, regular rate and rhythm, no Murmurs, gallops or rubs  Gastrointestinal: Bowel Sounds present, soft, nontender, nondistended, no guarding, no rebound  Extremities: No peripheral edema  Vascular: 2+ peripheral pulses  Neurological: A/O x 3,  No Aphasia or paraphasic errors. Naming /repetition intact   CN: FRANCISCO, EOMI, VFF, facial sensation normal, no NLFD, tongue midline, Palate moves equally, SCM equal bilaterally  Motor: No pronator drift, Strength 5/5 in BUE and RLE 5/5, Left  HF 5-/5, KE 5/5, KF 5-/5, DF 0/5, PF 0/5  Sens: decreased sensation below the knee in both lower extremities, left worse than right  Reflexes: UEs  1+ in biceps, radialis, absent  patellar on right, absent patellars and ankle jerks, downgoing toes b/l, negative clonus  Coord:  No FNFA, Gait: using cane, unsteady  Skin: No rashes  rectal : deferred, not indicated    Labs all reviewed      137  |  105  |  18  ----------------------------<  120<H>  3.8   |  30  |  0.82    Ca    8.8      14 Aug 2022 06:28    TPro  7.7  /  Alb  3.1<L>  /  TBili  0.7  /  DBili  x   /  AST  16  /  ALT  54  /  AlkPhos  83                              13.9   6.19  )-----------( 111      ( 14 Aug 2022 06:28 )             41.3             LIVER FUNCTIONS - ( 14 Aug 2022 06:28 )  Alb: 3.1 g/dL / Pro: 7.7 gm/dL / ALK PHOS: 83 U/L / ALT: 54 U/L / AST: 16 U/L / GGT: x           Sedimentation Rate, Erythrocyte (22 @ 06:28)    Sedimentation Rate, Erythrocyte: 21 mm/hr    Vitamin D, 25-Hydroxy (22 @ 06:28)    Vitamin D, 25-Hydroxy: 25.6: VITD Interpretive Data Result:  30.0-80.0 ng/mL Optimal levels (Reference Range)  >80.0 ng/mL Toxicity possible  20.0-29.0 ng/mL Insufficiency  10.0-19.0 ng/mL Mild to Moderate Deficiency  <10.0 ng/mL Severe Deficiency  Optimal levels for 25-Hydroxy vitamin D are 30.0 ng/mL and above based  upon the Endocrine Society guidelines 2011.  However, there is a lack of  consensus on this and the [place] of [employer] recommends 20.0 ng/mL and  above as optimal levels.  Vitamin D results may vary depending on the  method of analysis.  The Roche vikash e801 electrochemiluminescent  immunoassay method measures both D2 and D3. ng/mL    Vitamin B12, Serum in AM (22 @ 06:48)    Vitamin B12, Serum: 393 pg/mL  A1C with Estimated Average Glucose (22 @ 06:28)    A1C with Estimated Average Glucose Result: 5.8:     C-Reactive Protein, Serum: 7 mg/L (22 @ 06:28)  C-Reactive Protein, Serum: 3 mg/L (22 @ 17:22)     PTT - ( 12 Aug 2022 09:40 )  PTT:28.2 sec    Urinalysis Basic - ( 12 Aug 2022 09:39 )    Color: Yellow / Appearance: Clear / S.010 / pH: x  Gluc: x / Ketone: Negative  / Bili: Negative / Urobili: Negative   Blood: x / Protein: Negative / Nitrite: Negative   Leuk Esterase: Negative / RBC: x / WBC x   Sq Epi: x / Non Sq Epi: x / Bacteria: x    Babesia microti PCR, Blood. (22 @ 15:39)    Culture Results:   Babesia microti PCR  Results: NOT detected  ***************Result Note*************  The detection of Babesia microti by PCR has only been  validated for whole blood; this test has not been approved  by the US Food and Drug Administration (FDA). Performance  characteristics of this assay have been determined by  [employer]. The clinical significance  of results should be considered in conjunction with the  overall clinical presentation of the patient. Result is not  intended to be used as the sole means for clinical diagnosis  or patient management decisions.  One negative sample does not necessarily rule  out the presence of a parasitic infection.    Vitamin B12, Serum in AM (22 @ 06:48)    Vitamin B12, Serum: 393 pg/mL    Thyroid Stimulating Hormone, Serum in AM (22 @ 06:48)    Thyroid Stimulating Hormone, Serum: 0.98 uU/mL    Creatine Kinase, Serum (22 @ 07:08)    Creatine Kinase, Serum: 295 U/L     MR Cervical Spine w/wo IV Cont (22 @ 18:33) >  IMPRESSION:  1.  Poorly marginated T2 hyperintense enhancing region in the conus,   similar in appearance to prior study. Differential diagnosis includes   inflammatory/demyelinating lesion versus neoplasm versus infection. Given   the results of the lumbar puncture, demyelinating/inflammatory lesion is   favored.  2.  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  3.  No osseous metastases in the spine.    < from: US Duplex Venous Lower Ext Complete, Bilateral (22 @ 07:38) >  No evidence of deep venous thrombosis in either lower extremity.    < end of copied text >            MEDICATIONS  (STANDING):  acetaminophen     Tablet .. 650 milliGRAM(s) Oral daily  cyanocobalamin 1000 MICROGram(s) Oral daily  diphenhydrAMINE 25 milliGRAM(s) Oral daily  enoxaparin Injectable 40 milliGRAM(s) SubCutaneous every 24 hours  immune   globulin 10% (GAMMAGARD) IVPB 55 Gram(s) IV Intermittent daily  lisinopril 20 milliGRAM(s) Oral daily  pantoprazole    Tablet 40 milliGRAM(s) Oral before breakfast  polyethylene glycol 3350 17 Gram(s) Oral two times a day  predniSONE   Tablet 40 milliGRAM(s) Oral daily  psyllium Powder 2 Packet(s) Oral two times a day    MEDICATIONS  (PRN):  aluminum hydroxide/magnesium hydroxide/simethicone Suspension 30 milliLiter(s) Oral every 4 hours PRN Dyspepsia  melatonin 3 milliGRAM(s) Oral at bedtime PRN Insomnia  ondansetron Injectable 4 milliGRAM(s) IV Push every 6 hours PRN Nausea and/or Vomiting    
cc - left foot drop , numbness      47 year old obese man with a PMHx of colorectal cancer s/p resection and chemotherapy 4 years ago, peripheral neuropathy, chronic left foot drop for more than 1yr, Htn,  recently diagnosed renal Ca awaiting cryoablation tx at [facility] [place], [place]  admitted at  last week and treated with pulse dose steroids x 5days for a demyelinating process involving his lumbar spine, returns now to the ED, sent by his neurologist for worsening symptoms of LLE paresthesia and weakness, unable to walk w/o cane.  Last week  he had an MRI, LP which revealed an inflammatory process; Nerve conduction studies were performed as outpatient this week and revealed demyelinating disease.    On his last admission his symptoms have improved after 5 days of steroids and was able to walk unassisted; now he states that his numbness and weakness on his legs L>R have worsened after discharge and he is back to using his cane. He has had a chronic Left foot drop for >1yr; he now states that there is worsening in his foot drop. On his Rt foot there is no motor deficits but there is paresthesia from below the knee. He also describes saddle anesthesia w/o bowel incontinence.      - pt seen and examined in am,  left foot drop persist, numbness in the leg , paresthesia in buttocks,  perineum, hard to push urine out, denies constipation, plan discussed   - pt seen and examined, reports improvement of the left foot drop, + constipation, afebrile, denies cp, dyspnea, abd pain  8/15 - pt seen and examined, left foot drop improving, + constipation , denies abdominal pain, plan discussed   Review of system- Rest of the review of system are negative except mentioned in HPI       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  Rectal cancer, completed chemotherapy  Status post proctocolectomy  with temporary Ileostomy- reversed   Peripheral neuropathy  port cath insertion 2018           Vitals reviewed for last 24 hours  T(C): 36.8 (08-15-22 @ 15:53), Max: 36.9 (08-15-22 @ 13:20)  T(F): 98.3 (08-15-22 @ 15:53), Max: 98.5 (08-15-22 @ 13:20)  HR: 79 (08-15-22 @ 15:53) (65 - 88)  BP: 150/92 (08-15-22 @ 15:53) (135/88 - 156/96)  RR: 18 (08-15-22 @ 15:53) (17 - 18)  SpO2: 95% (08-15-22 @ 15:53) (95% - 99%)  Wt(kg): --    PHYSICAL EXAM:    Constitutional: NAD, awake and alert   HEENT: PERR, EOMI, Normal Hearing, MMM  Neck: Soft and supple, No LAD, No JVD  Respiratory: Breath sounds are clear bilaterally, No wheezing, rales or rhonchi  Cardiovascular: S1 and S2, regular rate and rhythm, no Murmurs, gallops or rubs  Gastrointestinal: Bowel Sounds present, soft, nontender, nondistended, no guarding, no rebound  Extremities: No peripheral edema  Vascular: 2+ peripheral pulses  Neurological: A/O x 3,  No Aphasia or paraphasic errors. Naming /repetition intact   CN: FRANCISCO, EOMI, VFF, facial sensation normal, no NLFD, tongue midline, Palate moves equally, SCM equal bilaterally  Motor: No pronator drift, Strength 5/5 in BUE and RLE 5/5, Left  HF 5-/5, KE 5/5, KF 5-/5, DF 0/5, PF 0/5  Sens: decreased sensation below the knee in both lower extremities, left worse than right  Reflexes: UEs  1+ in biceps, radialis, absent  patellar on right, absent patellars and ankle jerks, downgoing toes b/l, negative clonus  Coord:  No FNFA, Gait: using cane, unsteady  Skin: No rashes  rectal : deferred, not indicated    Labs all reviewed  08-15    138  |  107  |  20  ----------------------------<  109<H>  3.6   |  26  |  0.89    Ca    8.6      15 Aug 2022 07:00    TPro  8.6<H>  /  Alb  3.2<L>  /  TBili  0.7  /  DBili  x   /  AST  20  /  ALT  48  /  AlkPhos  68  08-15                            14.8   6.73  )-----------( 109      ( 15 Aug 2022 07:00 )             43.0             LIVER FUNCTIONS - ( 15 Aug 2022 07:00 )  Alb: 3.2 g/dL / Pro: 8.6 gm/dL / ALK PHOS: 68 U/L / ALT: 48 U/L / AST: 20 U/L / GGT: x                       137  |  105  |  18  ----------------------------<  120<H>  3.8   |  30  |  0.82    Ca    8.8      14 Aug 2022 06:28    TPro  7.7  /  Alb  3.1<L>  /  TBili  0.7  /  DBili  x   /  AST  16  /  ALT  54  /  AlkPhos  83                              13.9   6.19  )-----------( 111      ( 14 Aug 2022 06:28 )             41.3             LIVER FUNCTIONS - ( 14 Aug 2022 06:28 )  Alb: 3.1 g/dL / Pro: 7.7 gm/dL / ALK PHOS: 83 U/L / ALT: 54 U/L / AST: 16 U/L / GGT: x           Sedimentation Rate, Erythrocyte (22 @ 06:28)    Sedimentation Rate, Erythrocyte: 21 mm/hr    Vitamin D, 25-Hydroxy (22 @ 06:28)    Vitamin D, 25-Hydroxy: 25.6: VITD Interpretive Data Result:  30.0-80.0 ng/mL Optimal levels (Reference Range)  >80.0 ng/mL Toxicity possible  20.0-29.0 ng/mL Insufficiency  10.0-19.0 ng/mL Mild to Moderate Deficiency  <10.0 ng/mL Severe Deficiency  Optimal levels for 25-Hydroxy vitamin D are 30.0 ng/mL and above based  upon the Endocrine Society guidelines 2011.  However, there is a lack of  consensus on this and the [place] of [employer] recommends 20.0 ng/mL and  above as optimal levels.  Vitamin D results may vary depending on the  method of analysis.  The Roche vikash e801 electrochemiluminescent  immunoassay method measures both D2 and D3. ng/mL    Vitamin B12, Serum in AM (22 @ 06:48)    Vitamin B12, Serum: 393 pg/mL  A1C with Estimated Average Glucose (22 @ 06:28)    A1C with Estimated Average Glucose Result: 5.8:     C-Reactive Protein, Serum: 7 mg/L (22 @ 06:28)  C-Reactive Protein, Serum: 3 mg/L (22 @ 17:22)     PTT - ( 12 Aug 2022 09:40 )  PTT:28.2 sec    Urinalysis Basic - ( 12 Aug 2022 09:39 )    Color: Yellow / Appearance: Clear / S.010 / pH: x  Gluc: x / Ketone: Negative  / Bili: Negative / Urobili: Negative   Blood: x / Protein: Negative / Nitrite: Negative   Leuk Esterase: Negative / RBC: x / WBC x   Sq Epi: x / Non Sq Epi: x / Bacteria: x    Babesia microti PCR, Blood. (22 @ 15:39)    Culture Results:   Babesia microti PCR  Results: NOT detected  ***************Result Note*************  The detection of Babesia microti by PCR has only been  validated for whole blood; this test has not been approved  by the US Food and Drug Administration (FDA). Performance  characteristics of this assay have been determined by  [facility]. The clinical significance  of results should be considered in conjunction with the  overall clinical presentation of the patient. Result is not  intended to be used as the sole means for clinical diagnosis  or patient management decisions.  One negative sample does not necessarily rule  out the presence of a parasitic infection.    Vitamin B12, Serum in AM (22 @ 06:48)    Vitamin B12, Serum: 393 pg/mL    Thyroid Stimulating Hormone, Serum in AM (22 @ 06:48)    Thyroid Stimulating Hormone, Serum: 0.98 uU/mL    Creatine Kinase, Serum (22 @ 07:08)    Creatine Kinase, Serum: 295 U/L     MR Cervical Spine w/wo IV Cont (22 @ 18:33) >  IMPRESSION:  1.  Poorly marginated T2 hyperintense enhancing region in the conus,   similar in appearance to prior study. Differential diagnosis includes   inflammatory/demyelinating lesion versus neoplasm versus infection. Given   the results of the lumbar puncture, demyelinating/inflammatory lesion is   favored.  2.  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  3.  No osseous metastases in the spine.    < from: US Duplex Venous Lower Ext Complete, Bilateral (22 @ 07:38) >  No evidence of deep venous thrombosis in either lower extremity.    < end of copied text >            MEDICATIONS  (STANDING):  acetaminophen     Tablet .. 650 milliGRAM(s) Oral daily  cyanocobalamin 1000 MICROGram(s) Oral daily  diphenhydrAMINE 25 milliGRAM(s) Oral daily  enoxaparin Injectable 40 milliGRAM(s) SubCutaneous every 24 hours  immune   globulin 10% (GAMMAGARD) IVPB 55 Gram(s) IV Intermittent daily  lisinopril 20 milliGRAM(s) Oral daily  pantoprazole    Tablet 40 milliGRAM(s) Oral before breakfast  polyethylene glycol 3350 17 Gram(s) Oral two times a day  predniSONE   Tablet 40 milliGRAM(s) Oral daily  psyllium Powder 2 Packet(s) Oral two times a day    MEDICATIONS  (PRN):  aluminum hydroxide/magnesium hydroxide/simethicone Suspension 30 milliLiter(s) Oral every 4 hours PRN Dyspepsia  melatonin 3 milliGRAM(s) Oral at bedtime PRN Insomnia  ondansetron Injectable 4 milliGRAM(s) IV Push every 6 hours PRN Nausea and/or Vomiting    
HPI:  No new complaints, tolerated first dose of IVIG well    Vital Signs Last 24 Hrs  T(C): 36.8 (13 Aug 2022 09:30), Max: 37 (12 Aug 2022 21:30)  T(F): 98.2 (13 Aug 2022 09:30), Max: 98.6 (12 Aug 2022 21:30)  HR: 87 (13 Aug 2022 09:30) (69 - 90)  BP: 118/64 (13 Aug 2022 09:30) (100/87 - 139/68)  BP(mean): --  RR: 18 (13 Aug 2022 09:30) (18 - 18)  SpO2: 97% (13 Aug 2022 09:30) (95% - 98%)    Parameters below as of 13 Aug 2022 09:30  Patient On (Oxygen Delivery Method): room air        MEDICATIONS  (STANDING):  acetaminophen     Tablet .. 650 milliGRAM(s) Oral daily  cyanocobalamin 1000 MICROGram(s) Oral daily  diphenhydrAMINE 25 milliGRAM(s) Oral daily  enoxaparin Injectable 40 milliGRAM(s) SubCutaneous every 24 hours  immune   globulin 10% (GAMMAGARD) IVPB 55 Gram(s) IV Intermittent daily  lisinopril 20 milliGRAM(s) Oral daily  pantoprazole    Tablet 40 milliGRAM(s) Oral before breakfast  polyethylene glycol 3350 17 Gram(s) Oral two times a day  predniSONE   Tablet 40 milliGRAM(s) Oral daily  psyllium Powder 2 Packet(s) Oral two times a day    MEDICATIONS  (PRN):  aluminum hydroxide/magnesium hydroxide/simethicone Suspension 30 milliLiter(s) Oral every 4 hours PRN Dyspepsia  melatonin 3 milliGRAM(s) Oral at bedtime PRN Insomnia  ondansetron Injectable 4 milliGRAM(s) IV Push every 6 hours PRN Nausea and/or Vomiting      ROS: pertinent positives in HPI, all other ROS were reviewed and are negative     PHYSICAL EXAM:  Neuro  HF: A x O x 3. Appropriately interactive, normal affect. Speech fluent, No Aphasia or paraphasic errors. Naming /repetition intact   CN: FRANCISCO, EOMI, VFF, facial sensation normal, no NLFD, tongue midline, Palate moves equally, SCM equal bilaterally  Motor: No pronator drift, Strength 5/5 in BUE and RLE 5/5, Left  HF 5-/5, KE 5/5, KF 5-/5, DF 0/5, PF 0/5  Sens: decreased sensation below the knee in both lower extremities, left worse than right  Reflexes: UEs  1+ in biceps, radialis, absent  patellar on right, absent patellars and ankle jerks, downgoing toes b/l, negative clonus  Coord:  No FNFA  Gait: using cane, unsteady    LABS:                         15.9   9.08  )-----------( 148      ( 12 Aug 2022 09:40 )             46.4     08-12    137  |  105  |  20  ----------------------------<  130<H>  3.7   |  26  |  0.90    Ca    8.9      12 Aug 2022 09:40    TPro  6.6  /  Alb  3.4  /  TBili  1.6<H>  /  DBili  x   /  AST  17  /  ALT  88<H>  /  AlkPhos  65  08-12    LIVER FUNCTIONS - ( 12 Aug 2022 09:40 )  Alb: 3.4 g/dL / Pro: 6.6 gm/dL / ALK PHOS: 65 U/L / ALT: 88 U/L / AST: 17 U/L / GGT: x           A/P:  47 year old man admitted for course of IVIG to treat suspected inflammatory lesion affecting conus medullaris.  Tolerating IVIG well.    Recommend:  1. IVIg 55 grams/daily x 5 days.  2. He can continue his oral steroid taper.  3. Physical therapy  4. DVT prophylaxis.    Tray Cassidy MD  Neurology Attending Physician          
Pt feels better, walking in hallway, has no new complaints, , tolerated IVIG well, today   # day 4    ROS: As above, other ROS negative    MEDICATIONS  (STANDING):  acetaminophen     Tablet .. 650 milliGRAM(s) Oral daily  bisacodyl 5 milliGRAM(s) Oral every 12 hours  cholecalciferol 2000 Unit(s) Oral daily  cyanocobalamin 1000 MICROGram(s) Oral daily  diphenhydrAMINE 25 milliGRAM(s) Oral daily  enoxaparin Injectable 40 milliGRAM(s) SubCutaneous every 24 hours  immune   globulin 10% (GAMMAGARD) IVPB 55 Gram(s) IV Intermittent daily  lisinopril 20 milliGRAM(s) Oral daily  pantoprazole    Tablet 40 milliGRAM(s) Oral before breakfast  polyethylene glycol 3350 17 Gram(s) Oral two times a day  predniSONE   Tablet 40 milliGRAM(s) Oral daily  psyllium Powder 2 Packet(s) Oral two times a day      Vital Signs Last 24 Hrs  T(C): 36.9 (15 Aug 2022 13:20), Max: 36.9 (15 Aug 2022 13:20)  T(F): 98.5 (15 Aug 2022 13:20), Max: 98.5 (15 Aug 2022 13:20)  HR: 88 (15 Aug 2022 13:20) (65 - 88)  BP: 152/89 (15 Aug 2022 13:20) (125/82 - 156/96)  BP(mean): --  RR: 18 (15 Aug 2022 13:20) (17 - 18)  SpO2: 96% (15 Aug 2022 13:20) (95% - 100%)      Neuro  HF: A x O x 3. Appropriately interactive, normal affect. Speech fluent, No Aphasia or paraphasic errors. Naming /repetition intact   CN: FRANCISCO, EOMI, VFF, facial sensation normal, no NLFD, tongue midline, Palate moves equally, SCM equal bilaterally  Motor: No pronator drift, Strength 5/5 in BUE and RLE 5/5, Left  HF 5-/5, KE 5/5, KF 5-/5, DF 0/5, PF 0/5  Sens: decreased sensation below the knee in both lower extremities, left worse than right  Reflexes: UEs  1+ in biceps, radialis, absent  patellar on right, absent patellars and ankle jerks, downgoing toes b/l, negative clonus  Coord:  No FNFA  Gait: slapping - using cane    Labs:                        14.8   6.73  )-----------( 109      ( 15 Aug 2022 07:00 )             43.0     08-15    138  |  107  |  20  ----------------------------<  109<H>  3.6   |  26  |  0.89    Ca    8.6      15 Aug 2022 07:00    TPro  8.6<H>  /  Alb  3.2<L>  /  TBili  0.7  /  DBili  x   /  AST  20  /  ALT  48  /  AlkPhos  68  08-15          
Pt reported feeling weak in left leg today, is able to walk in hallway with cane, has comlpeted IVIG today # day 5    ROS: As above, other ROS negative    MEDICATIONS  (STANDING):  amLODIPine   Tablet 2.5 milliGRAM(s) Oral daily  bisacodyl 5 milliGRAM(s) Oral every 12 hours  cholecalciferol 2000 Unit(s) Oral daily  cyanocobalamin 1000 MICROGram(s) Oral daily  enoxaparin Injectable 40 milliGRAM(s) SubCutaneous every 24 hours  lisinopril 20 milliGRAM(s) Oral daily  magnesium hydroxide Suspension 30 milliLiter(s) Oral daily  pantoprazole    Tablet 40 milliGRAM(s) Oral before breakfast  polyethylene glycol 3350 17 Gram(s) Oral two times a day  predniSONE   Tablet 40 milliGRAM(s) Oral daily  psyllium Powder 2 Packet(s) Oral two times a day      Vital Signs Last 24 Hrs  T(C): 36.6 (16 Aug 2022 15:01), Max: 36.8 (15 Aug 2022 21:34)  T(F): 97.8 (16 Aug 2022 15:01), Max: 98.3 (15 Aug 2022 21:34)  HR: 65 (16 Aug 2022 15:01) (65 - 78)  BP: 151/94 (16 Aug 2022 15:01) (114/91 - 162/89)  BP(mean): --  RR: 18 (16 Aug 2022 15:01) (17 - 18)  SpO2: 98% (16 Aug 2022 15:01) (95% - 100%)     Neuro  HF: A x O x 3. Appropriately interactive, normal affect. Speech fluent, No Aphasia or paraphasic errors. Naming /repetition intact   CN: FRANCISCO, EOMI, VFF, facial sensation normal, no NLFD, tongue midline, Palate moves equally, SCM equal bilaterally  Motor: No pronator drift, Strength 5/5 in BUE and RLE 5/5, Left  HF 5-/5, KE 5/5, KF 5-/5, DF 0/5, PF 0/5  Sens: decreased sensation below the knee in both lower extremities, left worse than right  Reflexes: UEs  1+ in biceps, radialis, absent  patellar on right, absent patellars and ankle jerks, downgoing toes b/l, negative clonus  Coord:  No FNFA  Gait: slapping - using cane                        13.4   6.86  )-----------( 109      ( 16 Aug 2022 07:06 )             40.5     08-16    138  |  107  |  20  ----------------------------<  115<H>  3.8   |  30  |  0.92    Ca    8.3<L>      16 Aug 2022 07:06  Phos  2.8     08-16  Mg     2.5     08-16    TPro  8.6<H>  /  Alb  3.2<L>  /  TBili  0.7  /  DBili  x   /  AST  20  /  ALT  48  /  AlkPhos  68  08-15

## 2022-08-16 NOTE — DIETITIAN INITIAL EVALUATION ADULT - ADD RECOMMEND
1) Continue with current therapeutic diet and encourage protein enriched foods with all meals. 2) Monitor weights and track trends 3) Monitor lytes and hydration replete prn 4) Monitor BM if none x > 3 days increase bowel meds 5) MVI w/ minerals daily to meet RDI's

## 2022-08-16 NOTE — DIETITIAN INITIAL EVALUATION ADULT - PERTINENT LABORATORY DATA
08-16    138  |  107  |  20  ----------------------------<  115<H>  3.8   |  30  |  0.92    Ca    8.3<L>      16 Aug 2022 07:06  Phos  2.8     08-16  Mg     2.5     08-16    TPro  8.6<H>  /  Alb  3.2<L>  /  TBili  0.7  /  DBili  x   /  AST  20  /  ALT  48  /  AlkPhos  68  08-15  A1C with Estimated Average Glucose Result: 5.8 % (08-14-22 @ 06:28)  A1C with Estimated Average Glucose Result: 5.3 % (09-23-21 @ 14:53)

## 2022-08-16 NOTE — DIETITIAN INITIAL EVALUATION ADULT - PERTINENT MEDS FT
MEDICATIONS  (STANDING):  amLODIPine   Tablet 2.5 milliGRAM(s) Oral daily  bisacodyl 5 milliGRAM(s) Oral every 12 hours  cholecalciferol 2000 Unit(s) Oral daily  cyanocobalamin 1000 MICROGram(s) Oral daily  enoxaparin Injectable 40 milliGRAM(s) SubCutaneous every 24 hours  lisinopril 20 milliGRAM(s) Oral daily  magnesium hydroxide Suspension 30 milliLiter(s) Oral daily  pantoprazole    Tablet 40 milliGRAM(s) Oral before breakfast  polyethylene glycol 3350 17 Gram(s) Oral two times a day  predniSONE   Tablet 40 milliGRAM(s) Oral daily  psyllium Powder 2 Packet(s) Oral two times a day    MEDICATIONS  (PRN):  aluminum hydroxide/magnesium hydroxide/simethicone Suspension 30 milliLiter(s) Oral every 4 hours PRN Dyspepsia  melatonin 3 milliGRAM(s) Oral at bedtime PRN Insomnia  ondansetron Injectable 4 milliGRAM(s) IV Push every 6 hours PRN Nausea and/or Vomiting

## 2022-08-16 NOTE — CONSULT NOTE ADULT - SUBJECTIVE AND OBJECTIVE BOX
HPI:  History of Present Illness:   47 year old obese man with a PMHx of colorectal cancer s/p resection and chemotherapy 4 years ago, peripheral neuropathy, chronic left foot drop for more than 1yr, Htn,  recently diagnosed renal Ca awaiting cryoablation tx at [facility] [place], admitted at  last week and treated with pulse dose steroids x 5days for a demyelinating process involving his lumbar spine, returns now to the ED, sent by his neurologist for worsening symptoms of LLE paresthesia and weakness, unable to walk w/o cane.  Last week  he had an MRI, LP which revealed an inflammatory process; Nerve conduction studies were performed as outpatient this week and revealed demyelinating disease.    On his last admission his symptoms have improved after 5 days of steroids and was able to walk unassisted; now he states that his numbness and weakness on his legs L>R have worsened after discharge and he is back to using his cane.  He has had a chronic Left foot drop for >1yr; he now states that there is worsening in his foot drop. On his Rt foot there is no motor deficits but there is paresthesia from below the knee.  He also describes saddle anesthesia w/o bowel incontinence.      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  Rectal cancer, completed chemotherapy  Status post proctocolectomy  with temporary Ileostomy- reversed 2019  Peripheral neuropathy  port cath insertion 08/2018      FAMILY HISTORY:  Father: Htn, Valve replacements, renal transplant     Social History:   Nonsmoker, no drug or alcohol use            REVIEW OF SYSTEMS:    CONSTITUTIONAL: No weakness, No fevers or chills  ENT: No ear ache, No sorethroat  NECK: No pain, No stiffness  RESPIRATORY: No cough, No wheezing, No hemoptysis; No dyspnea  CARDIOVASCULAR: No chest pain, No palpitations  GASTROINTESTINAL: No abd pain, No nausea, No vomiting, No hematemesis, No diarrhea or constipation. No melena, No hematochezia.  GENITOURINARY: No dysuria, No  hematuria  NEUROLOGICAL: No diplopia, +left and right foot  paresthesia, Left leg weakness  MUSCULOSKELETAL: No arthralgia, No myalgia  SKIN: No rashes, or lesions   PSYCH: no anxiety, no suicidal ideation    All other review of systems is negative unless indicated above    Vital Signs Last 24 Hrs  T(C): 36.8 (12 Aug 2022 12:59), Max: 36.8 (12 Aug 2022 12:59)  T(F): 98.2 (12 Aug 2022 12:59), Max: 98.2 (12 Aug 2022 12:59)  HR: 81 (12 Aug 2022 12:59) (69 - 81)  BP: 100/87 (12 Aug 2022 12:59) (100/87 - 153/89)  BP(mean): 102 (12 Aug 2022 10:27) (102 - 105)  RR: 18 (12 Aug 2022 12:59) (17 - 18)  SpO2: 95% (12 Aug 2022 12:59) (95% - 98%)    Parameters below as of 12 Aug 2022 12:41  Patient On (Oxygen Delivery Method): room air        PHYSICAL EXAM:    GENERAL: NAD  HEENT:  NC/AT, EOMI, PERRLA, No scleral icterus, Moist mucous membranes  NECK: Supple, No JVD  CNS:  Alert & Oriented X3, +left foot drop, rt foot 5/5 strengths, +bilateral paresthesia of the legs and feet (L>R),  DTRs 2+ intact   LUNG: Normal Breath sounds, Clear to auscultation bilaterally, No rales, No rhonchi, No wheezing  HEART: RRR; No murmurs, No rubs  ABDOMEN: +BS, ST/ND/NT  GENITOURINARY: Voiding, Bladder not distended  EXTREMITIES:  2+ Peripheral Pulses, No clubbing, No cyanosis, No tibial edema  MUSCULOSKELTAL: Joints normal ROM, No TTP, No effusion  SKIN: no rashes  RECTAL: deferred, not indicated  BREAST: deferred                          15.9   9.08  )-----------( 148      ( 12 Aug 2022 09:40 )             46.4     08-12    137  |  105  |  20  ----------------------------<  130<H>  3.7   |  26  |  0.90    Ca    8.9      12 Aug 2022 09:40    TPro  6.6  /  Alb  3.4  /  TBili  1.6<H>  /  DBili  x   /  AST  17  /  ALT  88<H>  /  AlkPhos  65  08-12    Vancomycin levels:   Cultures:     MEDICATIONS  (STANDING):  acetaminophen     Tablet .. 650 milliGRAM(s) Oral daily  diphenhydrAMINE 25 milliGRAM(s) Oral daily  immune   globulin 10% (GAMMAGARD) IVPB 55 Gram(s) IV Intermittent daily    MEDICATIONS  (PRN):  ondansetron Injectable 4 milliGRAM(s) IV Push every 6 hours PRN Nausea and/or Vomiting      a/p:    1. Lumbar spine acute demyelinating disease:  -r/o TM vs other type of demyelinating process  -no evidence of leptomeningeal spread  : cytology negative in CSF  -MRI lumbar and thoracic spine w/o contrast noted: +inflammation in conus medullaris   -LP done: no evidence of infection, increased Proteins points toward an inflammatory process; cytology negative in CSF  -s/p Solumedrol IV 1gm daily x 5days, with transient initial  improvement in paresthesia and motor function     Admit to med surg floor:  -start IVIG x 5days  -neurology follow up  -c/w Prednisone 40mg/day and taper by 5mg a week       2. Htn:   c/w ACEinh    3. H/o chemotherapy induced peripheral neuropathy; chronic L foot drop    4.  h/o Colon CA, h/o LLE sarcoma, h/o renal CA :   Small renal Ca : scheduled for cryoablation at [facility] [place]    5. Constipation:  MIralax, Dulcolax, Ambulation   (12 Aug 2022 13:08)      PAST MEDICAL & SURGICAL HISTORY:  Essential hypertension      Seasonal allergies      Mild intermittent asthma without complication      Iron deficiency anemia due to chronic blood loss      Lumbar herniated disc      Obstructive sleep apnea syndrome  non compliant with CPAP      Sarcoma      Foot drop, left      Morbid obesity      Port-A-Cath in place  right chest wall inserted 2018      Hearing loss  b/l      Anemia  history of anemia      Rectal cancer  completed chemotherapy      COVID-19 vaccine series completed  [employer]- March      Peripheral neuropathy  chemotherpay induced peripheral neuropathy      History of colon surgery  for rectal cancer- ileostomy creatio and reversal in 2019      H/O myringotomy  bilateral. 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      S/P colonoscopy      History of other surgery  port cath insertion 08/2018      Status post proctocolectomy  with temporary Ileostomy- reversed 2019          Allergies    iodinated radiocontrast agents (Rash; Urticaria; Flushing; Hives)    Intolerances        MEDICATIONS  (STANDING):  acetaminophen     Tablet .. 650 milliGRAM(s) Oral daily  bisacodyl 5 milliGRAM(s) Oral every 12 hours  cholecalciferol 2000 Unit(s) Oral daily  cyanocobalamin 1000 MICROGram(s) Oral daily  diphenhydrAMINE 25 milliGRAM(s) Oral daily  enoxaparin Injectable 40 milliGRAM(s) SubCutaneous every 24 hours  immune   globulin 10% (GAMMAGARD) IVPB 55 Gram(s) IV Intermittent daily  lisinopril 20 milliGRAM(s) Oral daily  magnesium hydroxide Suspension 30 milliLiter(s) Oral daily  pantoprazole    Tablet 40 milliGRAM(s) Oral before breakfast  polyethylene glycol 3350 17 Gram(s) Oral two times a day  predniSONE   Tablet 40 milliGRAM(s) Oral daily  psyllium Powder 2 Packet(s) Oral two times a day    MEDICATIONS  (PRN):  aluminum hydroxide/magnesium hydroxide/simethicone Suspension 30 milliLiter(s) Oral every 4 hours PRN Dyspepsia  melatonin 3 milliGRAM(s) Oral at bedtime PRN Insomnia  ondansetron Injectable 4 milliGRAM(s) IV Push every 6 hours PRN Nausea and/or Vomiting      FAMILY HISTORY:  Family history of hypertension (Father)    Family history of renal disease (Father)        SOCIAL HISTORY: No EtOH, no tobacco    REVIEW OF SYSTEMS:    CONSTITUTIONAL: No weakness, fevers or chills  EYES/ENT: No visual changes;  No vertigo or throat pain   NECK: No pain or stiffness  RESPIRATORY: No cough, wheezing, hemoptysis; No shortness of breath  CARDIOVASCULAR: No chest pain or palpitations  GASTROINTESTINAL: No abdominal or epigastric pain. No nausea, vomiting, or hematemesis; No diarrhea or constipation. No melena or hematochezia.  GENITOURINARY: No dysuria, frequency or hematuria  NEUROLOGICAL: No numbness or weakness  SKIN: No itching, burning, rashes, or lesions   All other review of systems is negative unless indicated above.        T(F): 98.3 (08-15-22 @ 21:34), Max: 98.5 (08-15-22 @ 13:20)  HR: 67 (08-15-22 @ 21:34)  BP: 146/90 (08-15-22 @ 21:34)  RR: 18 (08-15-22 @ 21:34)  SpO2: 98% (08-15-22 @ 21:34)  Wt(kg): --    GENERAL: NAD, well-developed  HEAD:  Atraumatic, Normocephalic  EYES: EOMI, PERRLA, conjunctiva and sclera clear  NECK: Supple, No JVD  CHEST/LUNG: Clear to auscultation bilaterally; No wheeze  HEART: Regular rate and rhythm; No murmurs, rubs, or gallops  ABDOMEN: Soft, Nontender, Nondistended; Bowel sounds present  EXTREMITIES:  2+ Peripheral Pulses, No clubbing, cyanosis, or edema  NEUROLOGY: non-focal  SKIN: No rashes or lesions                          14.8   6.73  )-----------( 109      ( 15 Aug 2022 07:00 )             43.0       08-15    138  |  107  |  20  ----------------------------<  109<H>  3.6   |  26  |  0.89    Ca    8.6      15 Aug 2022 07:00    TPro  8.6<H>  /  Alb  3.2<L>  /  TBili  0.7  /  DBili  x   /  AST  20  /  ALT  48  /  AlkPhos  68  08-15              .CSF  08-02 @ 16:00   No growth  --    No polymorphonuclear cells seen  No organisms seen  by cytocentrifuge      .Blood None  08-02 @ 15:39   Babesia microti PCR  Results: NOT detected  ***************Result Note*************  The detection of Babesia microti by PCR has only been  validated for whole blood; this test has not been approved  by the US Food and Drug Administration (FDA). Performance  characteristics of this assay have been determined by  [employer]. The clinical significance  of results should be considered in conjunction with the  overall clinical presentation of the patient. Result is not  intended to be used as the sole means for clinical diagnosis  or patient management decisions.  One negative sample does not necessarily rule  out the presence of a parasitic infection.  --  --      .Blood None  08-02 @ 07:17   No Growth Final  --  --      Clean Catch None  08-01 @ 09:08   <10,000 CFU/mL Normal Urogenital Margy  --  --       HPI:  History of Present Illness:   47 year old obese man with a PMHx of colorectal cancer s/p resection and chemotherapy 4 years ago, peripheral neuropathy, chronic left foot drop for more than 1yr, Htn,  recently diagnosed [place] Ca [place] [facility] [place] and treated with pulse dose steroids x 5days for a demyelinating process involving his lumbar spine, returns now to the ED, sent by his neurologist for worsening symptoms of LLE paresthesia and weakness, unable to walk w/o cane.  Last week  he had an MRI, LP which revealed an inflammatory process; Nerve conduction studies were performed as outpatient this week and revealed demyelinating disease.    On his last admission his symptoms have improved after 5 days of steroids and was able to walk unassisted; now he states that his numbness and weakness on his legs L>R have worsened after discharge and he is back to using his cane.  He has had a chronic Left foot drop for >1yr; he now states that there is worsening in his foot drop. On his Rt foot there is no motor deficits but there is paresthesia from below the knee.  He also describes saddle anesthesia w/o bowel incontinence.      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  Rectal cancer, completed chemotherapy  Status post proctocolectomy  with temporary Ileostomy- reversed 2019  Peripheral neuropathy  port cath insertion 08/2018      FAMILY HISTORY:  Father: Htn, Valve replacements, renal transplant     Social History:   Nonsmoker, no drug or alcohol use            REVIEW OF SYSTEMS:    CONSTITUTIONAL: No weakness, No fevers or chills  ENT: No ear ache, No sorethroat  NECK: No pain, No stiffness  RESPIRATORY: No cough, No wheezing, No hemoptysis; No dyspnea  CARDIOVASCULAR: No chest pain, No palpitations  GASTROINTESTINAL: No abd pain, No nausea, No vomiting, No hematemesis, No diarrhea or constipation. No melena, No hematochezia.  GENITOURINARY: No dysuria, No  hematuria  NEUROLOGICAL: No diplopia, +left and right foot  paresthesia, Left leg weakness  MUSCULOSKELETAL: No arthralgia, No myalgia  SKIN: No rashes, or lesions   PSYCH: no anxiety, no suicidal ideation    All other review of systems is negative unless indicated above    Vital Signs Last 24 Hrs  T(C): 36.8 (12 Aug 2022 12:59), Max: 36.8 (12 Aug 2022 12:59)  T(F): 98.2 (12 Aug 2022 12:59), Max: 98.2 (12 Aug 2022 12:59)  HR: 81 (12 Aug 2022 12:59) (69 - 81)  BP: 100/87 (12 Aug 2022 12:59) (100/87 - 153/89)  BP(mean): 102 (12 Aug 2022 10:27) (102 - 105)  RR: 18 (12 Aug 2022 12:59) (17 - 18)  SpO2: 95% (12 Aug 2022 12:59) (95% - 98%)    Parameters below as of 12 Aug 2022 12:41  Patient On (Oxygen Delivery Method): room air        PHYSICAL EXAM:    GENERAL: NAD  HEENT:  NC/AT, EOMI, PERRLA, No scleral icterus, Moist mucous membranes  NECK: Supple, No JVD  CNS:  Alert & Oriented X3, +left foot drop, rt foot 5/5 strengths, +bilateral paresthesia of the legs and feet (L>R),  DTRs 2+ intact   LUNG: Normal Breath sounds, Clear to auscultation bilaterally, No rales, No rhonchi, No wheezing  HEART: RRR; No murmurs, No rubs  ABDOMEN: +BS, ST/ND/NT  GENITOURINARY: Voiding, Bladder not distended  EXTREMITIES:  2+ Peripheral Pulses, No clubbing, No cyanosis, No tibial edema  MUSCULOSKELTAL: Joints normal ROM, No TTP, No effusion  SKIN: no rashes  RECTAL: deferred, not indicated  BREAST: deferred                          15.9   9.08  )-----------( 148      ( 12 Aug 2022 09:40 )             46.4     08-12    137  |  105  |  20  ----------------------------<  130<H>  3.7   |  26  |  0.90    Ca    8.9      12 Aug 2022 09:40    TPro  6.6  /  Alb  3.4  /  TBili  1.6<H>  /  DBili  x   /  AST  17  /  ALT  88<H>  /  AlkPhos  65  08-12    Vancomycin levels:   Cultures:     MEDICATIONS  (STANDING):  acetaminophen     Tablet .. 650 milliGRAM(s) Oral daily  diphenhydrAMINE 25 milliGRAM(s) Oral daily  immune   globulin 10% (GAMMAGARD) IVPB 55 Gram(s) IV Intermittent daily    MEDICATIONS  (PRN):  ondansetron Injectable 4 milliGRAM(s) IV Push every 6 hours PRN Nausea and/or Vomiting      a/p:          PAST MEDICAL & SURGICAL HISTORY:  Essential hypertension      Seasonal allergies      Mild intermittent asthma without complication      Iron deficiency anemia due to chronic blood loss      Lumbar herniated disc      Obstructive sleep apnea syndrome  non compliant with CPAP      Sarcoma      Foot drop, left      Morbid obesity      Port-A-Cath in place  right chest wall inserted 2018      Hearing loss  b/l      Anemia  history of anemia      Rectal cancer  completed chemotherapy      COVID-19 vaccine series completed  [employer]- March      Peripheral neuropathy  chemotherpay induced peripheral neuropathy      History of colon surgery  for rectal cancer- ileostomy creatio and reversal in 2019      H/O myringotomy  bilateral. 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      S/P colonoscopy      History of other surgery  port cath insertion 08/2018      Status post proctocolectomy  with temporary Ileostomy- reversed 2019          Allergies    iodinated radiocontrast agents (Rash; Urticaria; Flushing; Hives)    Intolerances        MEDICATIONS  (STANDING):  acetaminophen     Tablet .. 650 milliGRAM(s) Oral daily  bisacodyl 5 milliGRAM(s) Oral every 12 hours  cholecalciferol 2000 Unit(s) Oral daily  cyanocobalamin 1000 MICROGram(s) Oral daily  diphenhydrAMINE 25 milliGRAM(s) Oral daily  enoxaparin Injectable 40 milliGRAM(s) SubCutaneous every 24 hours  immune   globulin 10% (GAMMAGARD) IVPB 55 Gram(s) IV Intermittent daily  lisinopril 20 milliGRAM(s) Oral daily  magnesium hydroxide Suspension 30 milliLiter(s) Oral daily  pantoprazole    Tablet 40 milliGRAM(s) Oral before breakfast  polyethylene glycol 3350 17 Gram(s) Oral two times a day  predniSONE   Tablet 40 milliGRAM(s) Oral daily  psyllium Powder 2 Packet(s) Oral two times a day    MEDICATIONS  (PRN):  aluminum hydroxide/magnesium hydroxide/simethicone Suspension 30 milliLiter(s) Oral every 4 hours PRN Dyspepsia  melatonin 3 milliGRAM(s) Oral at bedtime PRN Insomnia  ondansetron Injectable 4 milliGRAM(s) IV Push every 6 hours PRN Nausea and/or Vomiting      FAMILY HISTORY:  Family history of hypertension (Father)    Family history of renal disease (Father)        SOCIAL HISTORY: No EtOH, no tobacco    REVIEW OF SYSTEMS:    CONSTITUTIONAL: No weakness, fevers or chills  EYES/ENT: No visual changes;  No vertigo or throat pain   NECK: No pain or stiffness  RESPIRATORY: No cough, wheezing, hemoptysis; No shortness of breath  CARDIOVASCULAR: No chest pain or palpitations  GASTROINTESTINAL: No abdominal or epigastric pain. No nausea, vomiting, or hematemesis; No diarrhea or constipation. No melena or hematochezia.  GENITOURINARY: No dysuria, frequency or hematuria  NEUROLOGICAL: No numbness or weakness  SKIN: No itching, burning, rashes, or lesions   All other review of systems is negative unless indicated above.        T(F): 98.3 (08-15-22 @ 21:34), Max: 98.5 (08-15-22 @ 13:20)  HR: 67 (08-15-22 @ 21:34)  BP: 146/90 (08-15-22 @ 21:34)  RR: 18 (08-15-22 @ 21:34)  SpO2: 98% (08-15-22 @ 21:34)  Wt(kg): --    GENERAL: NAD, well-developed  HEAD:  Atraumatic, Normocephalic  EYES: EOMI, PERRLA, conjunctiva and sclera clear  NECK: Supple, No JVD  CHEST/LUNG: Clear to auscultation bilaterally; No wheeze  HEART: Regular rate and rhythm; No murmurs, rubs, or gallops  ABDOMEN: Soft, Nontender, Nondistended; Bowel sounds present  EXTREMITIES:  2+ Peripheral Pulses, No clubbing, cyanosis, or edema  NEUROLOGY: non-focal  SKIN: No rashes or lesions                          14.8   6.73  )-----------( 109      ( 15 Aug 2022 07:00 )             43.0       08-15    138  |  107  |  20  ----------------------------<  109<H>  3.6   |  26  |  0.89    Ca    8.6      15 Aug 2022 07:00    TPro  8.6<H>  /  Alb  3.2<L>  /  TBili  0.7  /  DBili  x   /  AST  20  /  ALT  48  /  AlkPhos  68  08-15              .CSF  08-02 @ 16:00   No growth  --    No polymorphonuclear cells seen  No organisms seen  by cytocentrifuge      .Blood None  08-02 @ 15:39   Babesia microti PCR  Results: NOT detected  ***************Result Note*************  The detection of Babesia microti by PCR has only been  validated for whole blood; this test has not been approved  by the US Food and Drug Administration (FDA). Performance  characteristics of this assay have been determined by  [facility] [employer]. The clinical significance  of results should be considered in conjunction with the  overall clinical presentation of the patient. Result is not  intended to be used as the sole means for clinical diagnosis  or patient management decisions.  One negative sample does not necessarily rule  out the presence of a parasitic infection.  --  --      .Blood None  08-02 @ 07:17   No Growth Final  --  --      Clean Catch None  08-01 @ 09:08   <10,000 CFU/mL Normal Urogenital Margy  --  --

## 2022-08-16 NOTE — DIETITIAN INITIAL EVALUATION ADULT - ORAL INTAKE PTA/DIET HISTORY
Regular diet PTA consumed as per patient. PO intake fair however fluctuates (50-75%). Stated has had some weight loss.

## 2022-08-16 NOTE — DISCHARGE NOTE PROVIDER - NSDCCPCAREPLAN_GEN_ALL_CORE_FT
PRINCIPAL DISCHARGE DIAGNOSIS  Diagnosis: CIDP (chronic inflammatory demyelinating polyneuropathy)  Assessment and Plan of Treatment: you have recieved IV IG for 5 days   continue taper of the steroids as prescribed   follow up within 1 week with neurologist Dr. Wiseman   Return to ED if new weakness, numbness, loss of functions or other concerns      SECONDARY DISCHARGE DIAGNOSES  Diagnosis: Renal cancer  Assessment and Plan of Treatment: follow up with your primary oncologist within1 week for further management and monitoring  call and reschedule cryoablation at [facility] [place]    Diagnosis: HTN (hypertension)  Assessment and Plan of Treatment: restart taking lisinopril/HCTZ at home  start taking amlodipine 2.5 mg at bedtime for better BP control  follow up with PCP within 1 week for further BP monitoring and medications adjastment    Diagnosis: Constipation  Assessment and Plan of Treatment: take laxatives as needed if there is no bowel movements for 2 days    Diagnosis: Vitamin D deficiency  Assessment and Plan of Treatment: take vitamin D daily    Diagnosis: Thrombocytopenia  Assessment and Plan of Treatment: repeat your blood work with PCP or oncologist to establish stability or resolution within 1week    Diagnosis: Prediabetes  Assessment and Plan of Treatment: A1C 5.8 in prediabetic range, likely due to  treatment with steroids   follow low carbohydrates diet ( eat less sweats, bread, pasta, rice) , repeat the test in 4-6 weeks

## 2022-08-16 NOTE — CONSULT NOTE ADULT - ASSESSMENT
47 year old male with history of colon CA followed stage III followed by Dr. Anthony Ratliff [facility] here for worsening gait abnormalities and difficulty with ambulation     # h/o colon CA and RCC  - patient was diagnosed , Stage IIIB disease Peter completed kenya­adjuvant therapy, concurrent chemo & RT (5 cycles FOLFOX) and 7 cycles of adjuvant FOLFOX (oxaliplatin d/c'd cycle 8) for total of 12/12 cycles.  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 Wiseman outpatient   - to f/u with Dr. Ratliff at [facility] as well outpatient on dc   - labs reviewed, WBC 6.7, Hb 14.8, plt 109    Dr. Ronan Meyer  cell: [phone]  Weekends and nights please call [phone] for MD on call  [facility]  Hematology/Oncology  47 year old male with history of colon CA followed stage III followed by Dr. Anthony Ratliff [facility] here for worsening gait abnormalities and difficulty with ambulation     # h/o colon CA and RCC  - patient was diagnosed , Stage IIIB disease Peter completed kenya­adjuvant therapy, concurrent chemo & RT (5 cycles FOLFOX) and 7 cycles of adjuvant FOLFOX (oxaliplatin d/c'd cycle 8) for total of 12/12 cycles.  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 Wiseman outpatient   - to f/u with Dr. Ratliff at [facility] as well outpatient on dc - has appt thursday this week  - labs reviewed, WBC 6.7, Hb 14.8, plt 109    Dr. Ronan Meyer  cell: [phone]  Weekends and nights please call [phone] for MD on call  [facility]  Hematology/Oncology  47 year old male with history of colon CA followed stage III followed by Dr. Anthony Ratliff [facility] here for worsening gait abnormalities and difficulty with ambulation     # h/o colon CA and RCC  - patient was diagnosed , Stage IIIB disease Peter completed kenya­adjuvant therapy, concurrent chemo & RT (5 cycles FOLFOX) and 7 cycles of adjuvant FOLFOX (oxaliplatin d/c'd cycle 8) for total of 12/12 cycles.  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 Wiseman outpatient   - to f/u with Dr. Ratliff at [facility] as well outpatient on dc - has appt thursday this week  - labs reviewed, WBC 6.7, Hb 14.8, plt 109    Dr. Ronan Meyer  cell: [phone]  Weekends and nights please call [phone] for MD on call  [facility]  Hematology/Oncology

## 2022-08-16 NOTE — DISCHARGE NOTE NURSING/CASE MANAGEMENT/SOCIAL WORK - PATIENT PORTAL LINK FT
You can access the FollowMyHealth Patient Portal offered by [facility] by registering at the following website: [email or web address]. By joining [employer]’s FollowMyHealth portal, you will also be able to view your health information using other applications (apps) compatible with our system.

## 2022-08-16 NOTE — DISCHARGE NOTE PROVIDER - NSDCFUSCHEDAPPT_GEN_ALL_CORE_FT
Adam Marks  [facility] Physician [facility]  NEUROSURG [address]  Scheduled Appointment: 08/30/2022

## 2022-08-16 NOTE — DISCHARGE NOTE NURSING/CASE MANAGEMENT/SOCIAL WORK - NSDCPEFALRISK_GEN_ALL_CORE
For information on Fall & Injury Prevention, visit: [email or web address] OR  [email or web address] OR  https://www.cdc.gov/steadi/patient.html

## 2022-08-16 NOTE — DISCHARGE NOTE PROVIDER - CARE PROVIDERS DIRECT ADDRESSES
,DirectAddress_Unknown,[email or web address],DirectAddress_Unknown

## 2022-08-18 LAB — PARANEOPLASTIC AB PNL SER: SIGNIFICANT CHANGE UP

## 2022-08-19 DIAGNOSIS — Z85.038 PERSONAL HISTORY OF OTHER MALIGNANT NEOPLASM OF LARGE INTESTINE: ICD-10-CM

## 2022-08-19 DIAGNOSIS — G61.81 CHRONIC INFLAMMATORY DEMYELINATING POLYNEURITIS: ICD-10-CM

## 2022-08-19 DIAGNOSIS — Z20.822 CONTACT WITH AND (SUSPECTED) EXPOSURE TO COVID-19: ICD-10-CM

## 2022-08-19 DIAGNOSIS — T45.1X5A ADVERSE EFFECT OF ANTINEOPLASTIC AND IMMUNOSUPPRESSIVE DRUGS, INITIAL ENCOUNTER: ICD-10-CM

## 2022-08-19 DIAGNOSIS — I10 ESSENTIAL (PRIMARY) HYPERTENSION: ICD-10-CM

## 2022-08-19 DIAGNOSIS — E66.9 OBESITY, UNSPECIFIED: ICD-10-CM

## 2022-08-19 DIAGNOSIS — G37.3 ACUTE TRANSVERSE MYELITIS IN DEMYELINATING DISEASE OF CENTRAL NERVOUS SYSTEM: ICD-10-CM

## 2022-08-19 DIAGNOSIS — E55.9 VITAMIN D DEFICIENCY, UNSPECIFIED: ICD-10-CM

## 2022-08-19 DIAGNOSIS — D69.6 THROMBOCYTOPENIA, UNSPECIFIED: ICD-10-CM

## 2022-08-19 DIAGNOSIS — Z79.52 LONG TERM (CURRENT) USE OF SYSTEMIC STEROIDS: ICD-10-CM

## 2022-08-19 DIAGNOSIS — J45.20 MILD INTERMITTENT ASTHMA, UNCOMPLICATED: ICD-10-CM

## 2022-08-19 DIAGNOSIS — G47.33 OBSTRUCTIVE SLEEP APNEA (ADULT) (PEDIATRIC): ICD-10-CM

## 2022-08-19 DIAGNOSIS — M21.372 FOOT DROP, LEFT FOOT: ICD-10-CM

## 2022-08-19 DIAGNOSIS — G62.0 DRUG-INDUCED POLYNEUROPATHY: ICD-10-CM

## 2022-08-19 DIAGNOSIS — R73.03 PREDIABETES: ICD-10-CM

## 2022-08-19 DIAGNOSIS — C64.1 MALIGNANT NEOPLASM OF RIGHT KIDNEY, EXCEPT RENAL PELVIS: ICD-10-CM

## 2022-08-19 DIAGNOSIS — K59.00 CONSTIPATION, UNSPECIFIED: ICD-10-CM

## 2022-08-19 LAB
PYRIDOXAL PHOS SERPL-MCNC: 8.1 UG/L — SIGNIFICANT CHANGE UP (ref 3.4–65.2)
VIT B1 SERPL-MCNC: 430.9 NMOL/L — HIGH (ref 66.5–200)

## 2022-08-24 ENCOUNTER — NON-APPOINTMENT (OUTPATIENT)
Age: 47
End: 2022-08-24

## 2022-08-30 ENCOUNTER — APPOINTMENT (OUTPATIENT)
Dept: NEUROSURGERY | Facility: CLINIC | Age: 47
End: 2022-08-30

## 2022-09-02 ENCOUNTER — NON-APPOINTMENT (OUTPATIENT)
Age: 47
End: 2022-09-02
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
## 2022-11-01 ENCOUNTER — APPOINTMENT (OUTPATIENT)
Dept: NEUROLOGY | Facility: CLINIC | Age: 47
End: 2022-11-01

## 2023-03-10 NOTE — PATIENT PROFILE ADULT - FALL HARM RISK - TYPE OF ASSESSMENT
PRE-SEDATION ASSESSMENT  3/10/2023    CONSENT  Consent for procedure and sedation obtained: Yes    MEDICAL HISTORY  Possible Pregnancy (LMP): No    PHYSICAL EXAM  History and Physical Reviewed: Patient has valid H&P within 30 days. I have reviewed and there are no changes.  Airway Risk History: No previous complications  Airway Anatomy : Class II  Heart : Normal  Lungs : Normal  LOC/Mental Status : Normal    OTHER FINDINGS  Reviewed current medications and allergies: Yes  Pertinent lab/diagnostic test reviewed: Yes    SEDATION RISK ASSESSMENT  Risk Status ASA: Class II   Plan for Sedation: Moderate Sedation  EKG Monitoring: Yes    NARRATIVE FINDINGS     The plan is for this patient to receive moderate sedation consisting of benzodiazepine and or opioid.  It is medically necessary that this patient requires IV sedation due to the patient's anxiety level, inability to tolerate pain discomfort and previous negative experience with needle injections.  The patient is suitable to undertake sedation.  The risks and benefits as well as alternative options have been discussed with the patient/decision-maker.    Steve Zamora MD  10:32 AM  03/10/23     Admission

## 2023-03-20 NOTE — ED ADULT NURSE NOTE - BEFAST ARM SIDE DRIFT
[General Appearance - Well Developed] : well developed [General Appearance - Well Nourished] : well nourished [Normal Appearance] : normal appearance [Well Groomed] : well groomed [General Appearance - In No Acute Distress] : no acute distress [Abdomen Soft] : soft [Abdomen Tenderness] : non-tender [Costovertebral Angle Tenderness] : no ~M costovertebral angle tenderness [Urethral Meatus] : meatus normal [Urinary Bladder Findings] : the bladder was normal on palpation [Scrotum] : the scrotum was normal [Testes Mass (___cm)] : there were no testicular masses [No Prostate Nodules] : no prostate nodules [Edema] : no peripheral edema [] : no respiratory distress No [Respiration, Rhythm And Depth] : normal respiratory rhythm and effort [Exaggerated Use Of Accessory Muscles For Inspiration] : no accessory muscle use [Oriented To Time, Place, And Person] : oriented to person, place, and time [Affect] : the affect was normal [Mood] : the mood was normal [Not Anxious] : not anxious [Normal Station and Gait] : the gait and station were normal for the patient's age [No Focal Deficits] : no focal deficits [No Palpable Adenopathy] : no palpable adenopathy

## 2023-05-12 NOTE — ED ADULT NURSE NOTE - NSFALLRSKHRMRISKTYPE_ED_ALL_ED
acetaminophen 325 mg oral tablet: 2 tab(s) orally every 6 hours, As needed, Temp greater or equal to 38C (100.4F), Mild Pain (1 - 3)  aluminum hydroxide-magnesium hydroxide 200 mg-200 mg/5 mL oral suspension: 30 milliliter(s) orally every 4 hours, As needed, Dyspepsia  amLODIPine 5 mg oral tablet: 1 tab(s) orally once a day  GABAPENTIN 300 MG CAPSULE: TAKE 1 CAPSULE BY MOUTH THREE TIMES A DAY  lacosamide 100 mg oral tablet: 1 tab(s) orally 2 times a day MDD:2 tabs (200 mg)  METHENAMINE CAMRON 1 GM TABLET: 1 tab(s) orally 2 times a day  pantoprazole 40 mg oral delayed release tablet: 1 tab(s) orally once a day  Synthroid 75 mcg (0.075 mg) oral tablet: 1 tab(s) orally once a day  
other

## 2023-05-26 NOTE — BRIEF OPERATIVE NOTE - NSICDXBRIEFPREOP_GEN_ALL_CORE_FT
05/25/23 0500   Appointment Info   Signing clinician's name / credentials Lauri Daniel, PT, DPT, OCS   Visits Used 10/10 Medicare/medicaid   Medical Diagnosis Radiculopathy, cervical region (M54.12)   PT Tx Diagnosis Decreased ROM, shoulder instability, weakness, and poor posture.   Progress Note/Certification   Onset of illness/injury or Date of Surgery 07/11/22   Therapy Frequency 2x/week   Predicted Duration 6 weeks   Progress Note Due Date 05/28/23       Present No   GOALS   PT Goals 2;3;4   PT Goal 1   Goal Identifier #1   Goal Description Pt will demonstrate ROM of the L shoulder to 90 degs flexion and abduction in order to improve to overhead activities.   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   Goal Progress Painful and difficult   PT Goal 3   Goal Identifier #3   Goal Description Pt will demonstrate ability to drive using L UE in order to be safe with controling stirring wheel and be comfortable while traveling.   Goal Progress unable   Target Date 05/28/23   PT Goal 4   Goal Identifier #4   Goal Description Pt will report 50% on the SPADI demonstrating improvement with function and pain in the L shoulder.   Goal Progress not met   Target Date 05/28/23   Subjective Report   Subjective Report Pt reports slight improvement since starting PT but this has been slow.  Pt reports pain in L shoulder is about 9/10 currently.   Objective Measures   Objective Measures Objective Measure 1;Objective Measure 2;Objective Measure 3;Objective Measure 4   Objective Measure 1   Objective Measure Symptoms   Details L shoulder and neck.   Objective Measure 2   Objective Measure Biceps Load   Details not tested today   Objective Measure 3   Objective Measure ROM   Details 70 degree AROM flexion. Full PROM all motions.    Objective Measure 4   Objective Measure Resisted Motion   Details Pain resisted L shoulder IR & ER   Ultrasound   Ultrasound Minutes (36446) 10   Skilled Intervention Applicaiton, parameter setting   Treatment Detail Continuous US to the L shoulder joint line and musculature 1.0mHz, 1.0w/cm2 x 8 mins.  2 mins for set up and clean up.   Patient Response/Progress Feels ok   Progress No adverse reactions.   Treatment Interventions (PT)   Interventions Neuromuscular Re-education;Therapeutic Procedure/Exercise   Therapeutic Procedure/Exercise   Therapeutic Procedures: strength, endurance, ROM, flexibillity minutes (99161) 15   Ther Proc 1 - Details PROM all planes of motion for L shoulder.  Performed to end range in all planes.   Skilled Intervention Exercises   Manual Therapy   Manual Therapy: Mobilization, MFR, MLD, friction massage minutes (75010) 15   Patient Response/Progress Increased soreness, increased ROM   Treatment Detail Mobilization of R GH inferior, anterior, and posterior directions grade III-IV.   Education   Learner/Method Patient   Education Comments Horizontal breathing, 90-90 positioning with pillows and towels every 1 hour, consider not chewing gum x 15 seconds every hour, using R hand to stroke jaws   Plan   Homework Positions of relief to decrease L shoulder irritability.   Home program NEW: Prone bilat scap retraction with towel under L humeral head, 1x15 every other day; propping bilat arms on pillows when sitting in chair to offload shoulder soft tissue PRN. (Patient declines written form of new exercises and states she will remember. PAUSE: Countertop shoulder abduction, supine shoulder flexion AAROM.   Updates to plan of care added 4 more sessions for advancing program   Plan for next session positioning and calming of symptoms - position (check), check horizontal breathing, continue PROM of shoulder   Total Session Time   Timed Code Treatment Minutes 40   Total Treatment Time (sum of timed  and untimed services) 40   Session Number   Authorization status CERT REQUIRED       PLAN  Continue therapy per current plan of care.    Beginning/End Dates of Progress Note Reporting Period:   4/14/23 to 05/25/2023    Referring Provider:  Kaylynn Rivers     PRE-OP DIAGNOSIS:  Incisional hernia 29-Sep-2021 10:54:41  Nahomy Graham

## 2023-06-08 ENCOUNTER — APPOINTMENT (OUTPATIENT)
Dept: NEUROSURGERY | Facility: CLINIC | Age: 48
End: 2023-06-08
Payer: COMMERCIAL

## 2023-06-08 VITALS
DIASTOLIC BLOOD PRESSURE: 91 MMHG | WEIGHT: 315 LBS | HEART RATE: 89 BPM | BODY MASS INDEX: 39.17 KG/M2 | SYSTOLIC BLOOD PRESSURE: 146 MMHG | OXYGEN SATURATION: 97 % | HEIGHT: 75 IN

## 2023-06-08 DIAGNOSIS — R20.2 PARESTHESIA OF SKIN: ICD-10-CM

## 2023-06-08 DIAGNOSIS — G62.9 POLYNEUROPATHY, UNSPECIFIED: ICD-10-CM

## 2023-06-08 PROCEDURE — 99214 OFFICE O/P EST MOD 30 MIN: CPT

## 2023-06-11 PROBLEM — R20.2 BILATERAL LEG PARESTHESIA: Status: ACTIVE | Noted: 2022-07-07

## 2023-06-11 PROBLEM — G62.9 DEMYELINATING NEUROPATHY: Status: ACTIVE | Noted: 2022-08-11

## 2023-06-11 NOTE — CONSULT LETTER
[Dear  ___] : Dear  [unfilled], [Courtesy Letter:] : I had the pleasure of seeing your patient, [unfilled], in my office today. [Sincerely,] : Sincerely, [FreeTextEntry2] : Ly Calixto MD\par [address]  [address]\par [place] [id number]  [FreeTextEntry1] : This very pleasant, unfortunate 48-year-old gentleman returns to our practice for assessment of the progressive neurologic decline. The patient has been referred by neurology for consideration of a sural nerve biopsy for the purposes of achieving a definitive diagnosis. 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 The patient also had renal cell carcinoma which was treated with chemotherapy. Over the past two years, the patient has had a progressive problem with developing bilateral foot drops and generalized weakness in the lower extremities and now extending to involve the upper extremities. He has been extensively evaluated at multiple medical centers including [place], [facility] surgery, [place], and the [facility]. In all cases, the pathology points toward an inflammatory or autoimmune progressive degenerative change. He has most recently been diagnosed with a chronic heart attack, which is a major issue. He has been treated with a combination of steroids and immunoglobulins with some modest positive impact. The ultimate diagnosis, however, remains elusive. CSF investigations on two separate occasions have yielded positive inflammatory responses and elevated proteins without further illumination.\par \par On examination, the patient is in some distress with walking secondary to his diffuse weakness. He has important left greater than right muscular weakness and atrophy and foot drop. He uses a cane to assist with ambulation and also to allow clearance of the toes, so he doesn't catch his feet and fall. There are no fasciculations. At today's visit, there is a distinct loss of sensation in the lower extremity compared to the right. The patient also indicates some burning sensation intermittently in the left foot. \par \par I've reviewed in detail the anatomy of the sural nerve and the technique of a sural nerve biopsy. The patient is well aware of an incision that then allows resection of a segment of approximately an inch to an inch and a half of the nerve, which would be then sent for specialty assessment at [facility]. As a result of the biopsy, he will be left with a portion of the lateral aspect of his foot with numbness. Currently, it is unclear if it would be most beneficial to take the biopsy from the left foot, which already has some clinical impact, or rather to do it on the right-hand side, where it currently appears to be intact. I am leaning towards the left-hand side. The patient expressed good understanding. The patient will be in contact with his neurologist regarding some recent pending laboratory results for cryoglobulins. If there is no additional diagnostic information provided by that, we will then move forward with a sural nerve biopsy at the soonest available time. \par \par Thank you for kindly including me in the evaluation and support of your patient. Please do not hesitate to contact me should you have any questions or concerns regarding this evaluation or the recommendation for consideration of a sural nerve biopsy for the purposes of degenerative neurologic pathology diagnosis. [FreeTextEntry3] : Jesse Carroll MD, PhD, FRCPSC \par Attending Neurosurgeon \par  of Neurosurgery \par [facility] \par [address] \[place] [address] \par Office: [phone] \par Fax: [phone]\par \par

## 2023-06-26 ENCOUNTER — RESULT REVIEW (OUTPATIENT)
Age: 48
End: 2023-06-26

## 2023-06-26 ENCOUNTER — OUTPATIENT (OUTPATIENT)
Dept: OUTPATIENT SERVICES | Facility: HOSPITAL | Age: 48
LOS: 1 days | End: 2023-06-26
Payer: COMMERCIAL

## 2023-06-26 VITALS
SYSTOLIC BLOOD PRESSURE: 127 MMHG | HEART RATE: 82 BPM | TEMPERATURE: 98 F | OXYGEN SATURATION: 96 % | DIASTOLIC BLOOD PRESSURE: 91 MMHG | HEIGHT: 75 IN | RESPIRATION RATE: 18 BRPM | WEIGHT: 315 LBS

## 2023-06-26 DIAGNOSIS — Z98.890 OTHER SPECIFIED POSTPROCEDURAL STATES: Chronic | ICD-10-CM

## 2023-06-26 DIAGNOSIS — Z87.39 PERSONAL HISTORY OF OTHER DISEASES OF THE MUSCULOSKELETAL SYSTEM AND CONNECTIVE TISSUE: ICD-10-CM

## 2023-06-26 DIAGNOSIS — Z41.9 ENCOUNTER FOR PROCEDURE FOR PURPOSES OTHER THAN REMEDYING HEALTH STATE, UNSPECIFIED: Chronic | ICD-10-CM

## 2023-06-26 DIAGNOSIS — Z90.49 ACQUIRED ABSENCE OF OTHER SPECIFIED PARTS OF DIGESTIVE TRACT: Chronic | ICD-10-CM

## 2023-06-26 DIAGNOSIS — I82.90 ACUTE EMBOLISM AND THROMBOSIS OF UNSPECIFIED VEIN: Chronic | ICD-10-CM

## 2023-06-26 DIAGNOSIS — Z01.818 ENCOUNTER FOR OTHER PREPROCEDURAL EXAMINATION: ICD-10-CM

## 2023-06-26 DIAGNOSIS — Z90.89 ACQUIRED ABSENCE OF OTHER ORGANS: Chronic | ICD-10-CM

## 2023-06-26 LAB
ANION GAP SERPL CALC-SCNC: 4 MMOL/L — LOW (ref 5–17)
APPEARANCE UR: CLEAR — SIGNIFICANT CHANGE UP
APTT BLD: 31.7 SEC — SIGNIFICANT CHANGE UP (ref 27.5–35.5)
BACTERIA # UR AUTO: NEGATIVE — SIGNIFICANT CHANGE UP
BASOPHILS # BLD AUTO: 0.03 K/UL — SIGNIFICANT CHANGE UP (ref 0–0.2)
BASOPHILS NFR BLD AUTO: 0.5 % — SIGNIFICANT CHANGE UP (ref 0–2)
BILIRUB UR-MCNC: NEGATIVE — SIGNIFICANT CHANGE UP
BLD GP AB SCN SERPL QL: SIGNIFICANT CHANGE UP
BUN SERPL-MCNC: 15 MG/DL — SIGNIFICANT CHANGE UP (ref 7–23)
CALCIUM SERPL-MCNC: 9.2 MG/DL — SIGNIFICANT CHANGE UP (ref 8.5–10.1)
CHLORIDE SERPL-SCNC: 108 MMOL/L — SIGNIFICANT CHANGE UP (ref 96–108)
CO2 SERPL-SCNC: 26 MMOL/L — SIGNIFICANT CHANGE UP (ref 22–31)
COLOR SPEC: YELLOW — SIGNIFICANT CHANGE UP
CREAT SERPL-MCNC: 0.77 MG/DL — SIGNIFICANT CHANGE UP (ref 0.5–1.3)
DIFF PNL FLD: ABNORMAL
EGFR: 109 ML/MIN/1.73M2 — SIGNIFICANT CHANGE UP
EOSINOPHIL # BLD AUTO: 0.08 K/UL — SIGNIFICANT CHANGE UP (ref 0–0.5)
EOSINOPHIL NFR BLD AUTO: 1.4 % — SIGNIFICANT CHANGE UP (ref 0–6)
EPI CELLS # UR: SIGNIFICANT CHANGE UP
GLUCOSE SERPL-MCNC: 112 MG/DL — HIGH (ref 70–99)
GLUCOSE UR QL: NEGATIVE — SIGNIFICANT CHANGE UP
HCT VFR BLD CALC: 42.2 % — SIGNIFICANT CHANGE UP (ref 39–50)
HGB BLD-MCNC: 14.5 G/DL — SIGNIFICANT CHANGE UP (ref 13–17)
IMM GRANULOCYTES NFR BLD AUTO: 0.3 % — SIGNIFICANT CHANGE UP (ref 0–0.9)
INR BLD: 1.05 RATIO — SIGNIFICANT CHANGE UP (ref 0.88–1.16)
KETONES UR-MCNC: NEGATIVE — SIGNIFICANT CHANGE UP
LEUKOCYTE ESTERASE UR-ACNC: NEGATIVE — SIGNIFICANT CHANGE UP
LYMPHOCYTES # BLD AUTO: 1.07 K/UL — SIGNIFICANT CHANGE UP (ref 1–3.3)
LYMPHOCYTES # BLD AUTO: 18.6 % — SIGNIFICANT CHANGE UP (ref 13–44)
MCHC RBC-ENTMCNC: 29 PG — SIGNIFICANT CHANGE UP (ref 27–34)
MCHC RBC-ENTMCNC: 34.4 GM/DL — SIGNIFICANT CHANGE UP (ref 32–36)
MCV RBC AUTO: 84.4 FL — SIGNIFICANT CHANGE UP (ref 80–100)
MONOCYTES # BLD AUTO: 0.37 K/UL — SIGNIFICANT CHANGE UP (ref 0–0.9)
MONOCYTES NFR BLD AUTO: 6.4 % — SIGNIFICANT CHANGE UP (ref 2–14)
MRSA PCR RESULT.: SIGNIFICANT CHANGE UP
NEUTROPHILS # BLD AUTO: 4.18 K/UL — SIGNIFICANT CHANGE UP (ref 1.8–7.4)
NEUTROPHILS NFR BLD AUTO: 72.8 % — SIGNIFICANT CHANGE UP (ref 43–77)
NITRITE UR-MCNC: NEGATIVE — SIGNIFICANT CHANGE UP
PH UR: 6 — SIGNIFICANT CHANGE UP (ref 5–8)
PLATELET # BLD AUTO: 153 K/UL — SIGNIFICANT CHANGE UP (ref 150–400)
POTASSIUM SERPL-MCNC: 3.7 MMOL/L — SIGNIFICANT CHANGE UP (ref 3.5–5.3)
POTASSIUM SERPL-SCNC: 3.7 MMOL/L — SIGNIFICANT CHANGE UP (ref 3.5–5.3)
PROT UR-MCNC: NEGATIVE — SIGNIFICANT CHANGE UP
PROTHROM AB SERPL-ACNC: 12.2 SEC — SIGNIFICANT CHANGE UP (ref 10.5–13.4)
RBC # BLD: 5 M/UL — SIGNIFICANT CHANGE UP (ref 4.2–5.8)
RBC # FLD: 13.5 % — SIGNIFICANT CHANGE UP (ref 10.3–14.5)
RBC CASTS # UR COMP ASSIST: SIGNIFICANT CHANGE UP /HPF (ref 0–4)
S AUREUS DNA NOSE QL NAA+PROBE: SIGNIFICANT CHANGE UP
SODIUM SERPL-SCNC: 138 MMOL/L — SIGNIFICANT CHANGE UP (ref 135–145)
SP GR SPEC: 1.01 — SIGNIFICANT CHANGE UP (ref 1.01–1.02)
UROBILINOGEN FLD QL: NEGATIVE — SIGNIFICANT CHANGE UP
WBC # BLD: 5.75 K/UL — SIGNIFICANT CHANGE UP (ref 3.8–10.5)
WBC # FLD AUTO: 5.75 K/UL — SIGNIFICANT CHANGE UP (ref 3.8–10.5)
WBC UR QL: NEGATIVE /HPF — SIGNIFICANT CHANGE UP (ref 0–5)

## 2023-06-26 PROCEDURE — 87640 STAPH A DNA AMP PROBE: CPT

## 2023-06-26 PROCEDURE — 71046 X-RAY EXAM CHEST 2 VIEWS: CPT

## 2023-06-26 PROCEDURE — 86901 BLOOD TYPING SEROLOGIC RH(D): CPT

## 2023-06-26 PROCEDURE — 71046 X-RAY EXAM CHEST 2 VIEWS: CPT | Mod: 26

## 2023-06-26 PROCEDURE — 85610 PROTHROMBIN TIME: CPT

## 2023-06-26 PROCEDURE — 86850 RBC ANTIBODY SCREEN: CPT

## 2023-06-26 PROCEDURE — 80048 BASIC METABOLIC PNL TOTAL CA: CPT

## 2023-06-26 PROCEDURE — 36415 COLL VENOUS BLD VENIPUNCTURE: CPT

## 2023-06-26 PROCEDURE — 86900 BLOOD TYPING SEROLOGIC ABO: CPT

## 2023-06-26 PROCEDURE — 85025 COMPLETE CBC W/AUTO DIFF WBC: CPT

## 2023-06-26 PROCEDURE — 85730 THROMBOPLASTIN TIME PARTIAL: CPT

## 2023-06-26 PROCEDURE — 87641 MR-STAPH DNA AMP PROBE: CPT

## 2023-06-26 PROCEDURE — 83036 HEMOGLOBIN GLYCOSYLATED A1C: CPT

## 2023-06-26 PROCEDURE — 81001 URINALYSIS AUTO W/SCOPE: CPT

## 2023-06-26 PROCEDURE — 99214 OFFICE O/P EST MOD 30 MIN: CPT | Mod: 25

## 2023-06-26 RX ORDER — POLYETHYLENE GLYCOL 3350 17 G/17G
17 POWDER, FOR SOLUTION ORAL
Qty: 0 | Refills: 0 | DISCHARGE

## 2023-06-26 RX ORDER — PSYLLIUM SEED (WITH DEXTROSE)
3 POWDER (GRAM) ORAL
Qty: 0 | Refills: 0 | DISCHARGE

## 2023-06-26 NOTE — H&P PST ADULT - ASSESSMENT
Plan  1. Stop all NSAIDS, herbal supplements and vitamins for 7 days.  2. NPO as per ASU instructions  3. Take the following medications (Metoprolol ) with small sips of water on the morning of your procedure/surgery.  4. Use EZ sponges as directed  5. Use mupirocin as directed  6. Labs, EKG, CXR as per surgeon.   7. Cardiologist and PMD visit for optimization prior to surgery as per surgeon.  8. Pt instructed to hold Eliquis before surgery as per Hematologist instructions, possible bridging therapy as per Hematologist.        48 years old male with Demyelinating neuropathy, left foot drop, PMH of PE/DVT on Eliquis, rectal cancer, HTN, sleep apnea. Pt reports progressively worsening neuropathy "from waste down", and poor balance. He is scheduled for Sural nerve biopsy.   Plan  1. Stop all NSAIDS, herbal supplements and vitamins for 7 days.  2. NPO as per ASU instructions  3. Take the following medications (Metoprolol ) with small sips of water on the morning of your procedure/surgery.  4. Use EZ sponges as directed  5. Use mupirocin as directed  6. Labs, EKG, CXR as per surgeon.   7. Cardiologist and PMD visit for optimization prior to surgery as per surgeon.  8. Pt instructed to hold Eliquis before surgery as per Hematologist instructions, possible bridging therapy as per Hematologist.        48 years old male with Demyelinating neuropathy, left foot drop, PMH of PE/DVT on Eliquis, rectal cancer, HTN, sleep apnea. Pt reports progressively worsening neuropathy "from waste down", and poor balance. He is scheduled for Sural nerve biopsy.   Plan  1. Stop all NSAIDS, herbal supplements and vitamins for 7 days.  2. NPO as per ASU instructions  3. Take the following medications (Metoprolol ) with small sips of water on the morning of your procedure/surgery.  4. Use EZ sponges as directed  5. Use mupirocin as directed  6. Labs, EKG, CXR as per surgeon.   7. Cardiologist and PMD visit for optimization prior to surgery as per surgeon.  8. Pt instructed to hold Eliquis before surgery as per Hematologist instructions, pt reports possible bridging therapy as per Hematologist preop plan of care.  9. Spoke with surgical coordinator Melanie (Dr. Carroll) about possible bridging therapy, to be done by Hematologist.        48 years old male with Demyelinating neuropathy, left foot drop, PMH of PE/DVT on Eliquis, rectal cancer, HTN, sleep apnea. Pt reports progressively worsening neuropathy "from waste down", and poor balance. He is scheduled for Sural nerve biopsy.   Plan  1. Stop all NSAIDS, herbal supplements and vitamins for 7 days.  2. NPO as per ASU instructions  3. Take the following medications (Metoprolol ) with small sips of water on the morning of your procedure/surgery.  4. Use EZ sponges as directed  5. Use mupirocin as directed  6. Labs, EKG, CXR as per surgeon.   7. Cardiologist and PMD visit for optimization prior to surgery as per surgeon.  8. Pt instructed to hold Eliquis before surgery as per Hematologist instructions, pt reports possible bridging therapy as per Hematologist preop plan of care.  9. Spoke with surgical coordinator Melanie (Dr. Carroll) to follow up on pt's Hematologist preop plan of care, bridging therapy.

## 2023-06-26 NOTE — H&P PST ADULT - HISTORY OF PRESENT ILLNESS
46 years old male with a history of rectal cancer. Pt reports he completed chemotherapy and radiation therapy presents to PST for preprocedure exam. Patient is for planned laparoscopic possible open ventral hernia repair with Dr Velarde  on 9/29.  As per patient he was recently hospitalized at  with an incarcerated hernia which was reduced. He denies acute pain. 48 years old male with Demyelinating neuropathy, left foot drop, PMH of PE/DVT on Eliquis, rectal cancer, HTN, sleep apnea. Pt reports progressively worsening neuropathy "from waste down", and poor balance, pt walks with a cane. He is here for PST for planned Sural nerve biopsy.

## 2023-06-26 NOTE — H&P PST ADULT - NSICDXPASTMEDICALHX_GEN_ALL_CORE_FT
PAST MEDICAL HISTORY:  Anemia history of anemia    COVID-19 vaccine series completed Pfizer- March    DVT, lower extremity     Essential hypertension     Foot drop, left     Hearing deficit     Hearing loss b/l    Iron deficiency anemia due to chronic blood loss     Lumbar herniated disc     Mild intermittent asthma without complication     Morbid obesity     Obstructive sleep apnea syndrome non compliant with CPAP    Peripheral neuropathy chemotherpay induced peripheral neuropathy    Port-A-Cath in place right chest wall inserted 2018    Pulmonary embolism     Rectal cancer completed chemotherapy    Sarcoma     Seasonal allergies      PAST MEDICAL HISTORY:  Anemia history of anemia    COVID-19 vaccine series completed Pfizer- March    Demyelinating neuropathy     DVT, lower extremity     Essential hypertension     Foot drop, left     Hearing deficit     Hearing loss b/l    Iron deficiency anemia due to chronic blood loss     Lumbar herniated disc     Mild intermittent asthma without complication     Morbid obesity     Obstructive sleep apnea syndrome non compliant with CPAP    Peripheral neuropathy chemotherpay induced peripheral neuropathy    Port-A-Cath in place right chest wall inserted 2018    Pulmonary embolism     Rectal cancer completed chemotherapy    Sarcoma     Seasonal allergies

## 2023-06-26 NOTE — H&P PST ADULT - NSICDXPASTSURGICALHX_GEN_ALL_CORE_FT
PAST SURGICAL HISTORY:  H/O myringotomy bilateral. tube in right ear presently    H/O ventral hernia repair     S/P colonoscopy     S/P T&A (status post tonsillectomy and adenoidectomy)     Status post proctocolectomy with temporary Ileostomy- reversed 2019    Thrombus

## 2023-06-27 DIAGNOSIS — Z87.39 PERSONAL HISTORY OF OTHER DISEASES OF THE MUSCULOSKELETAL SYSTEM AND CONNECTIVE TISSUE: ICD-10-CM

## 2023-06-27 DIAGNOSIS — Z01.818 ENCOUNTER FOR OTHER PREPROCEDURAL EXAMINATION: ICD-10-CM

## 2023-06-27 LAB
A1C WITH ESTIMATED AVERAGE GLUCOSE RESULT: 6.2 % — HIGH (ref 4–5.6)
ADD ON TEST-SPECIMEN IN LAB: SIGNIFICANT CHANGE UP
ESTIMATED AVERAGE GLUCOSE: 131 MG/DL — HIGH (ref 68–114)

## 2023-06-30 PROBLEM — I26.99 OTHER PULMONARY EMBOLISM WITHOUT ACUTE COR PULMONALE: Chronic | Status: ACTIVE | Noted: 2023-06-26

## 2023-06-30 PROBLEM — I82.409 ACUTE EMBOLISM AND THROMBOSIS OF UNSPECIFIED DEEP VEINS OF UNSPECIFIED LOWER EXTREMITY: Chronic | Status: ACTIVE | Noted: 2023-06-26

## 2023-06-30 PROBLEM — G62.9 POLYNEUROPATHY, UNSPECIFIED: Chronic | Status: ACTIVE | Noted: 2023-06-26

## 2023-06-30 PROBLEM — H91.90 UNSPECIFIED HEARING LOSS, UNSPECIFIED EAR: Chronic | Status: ACTIVE | Noted: 2023-06-26

## 2023-07-05 ENCOUNTER — OUTPATIENT (OUTPATIENT)
Dept: INPATIENT UNIT | Facility: HOSPITAL | Age: 48
LOS: 1 days | Discharge: HOME | End: 2023-07-05
Payer: COMMERCIAL

## 2023-07-05 ENCOUNTER — TRANSCRIPTION ENCOUNTER (OUTPATIENT)
Age: 48
End: 2023-07-05

## 2023-07-05 ENCOUNTER — APPOINTMENT (OUTPATIENT)
Dept: NEUROSURGERY | Facility: HOSPITAL | Age: 48
End: 2023-07-05

## 2023-07-05 ENCOUNTER — RESULT REVIEW (OUTPATIENT)
Age: 48
End: 2023-07-05

## 2023-07-05 VITALS
DIASTOLIC BLOOD PRESSURE: 81 MMHG | SYSTOLIC BLOOD PRESSURE: 129 MMHG | RESPIRATION RATE: 16 BRPM | HEIGHT: 75 IN | WEIGHT: 315 LBS | HEART RATE: 81 BPM | TEMPERATURE: 97 F

## 2023-07-05 VITALS
TEMPERATURE: 98 F | RESPIRATION RATE: 15 BRPM | OXYGEN SATURATION: 99 % | SYSTOLIC BLOOD PRESSURE: 136 MMHG | HEART RATE: 77 BPM | DIASTOLIC BLOOD PRESSURE: 80 MMHG

## 2023-07-05 DIAGNOSIS — Z87.891 PERSONAL HISTORY OF NICOTINE DEPENDENCE: ICD-10-CM

## 2023-07-05 DIAGNOSIS — Z86.718 PERSONAL HISTORY OF OTHER VENOUS THROMBOSIS AND EMBOLISM: ICD-10-CM

## 2023-07-05 DIAGNOSIS — G62.9 POLYNEUROPATHY, UNSPECIFIED: ICD-10-CM

## 2023-07-05 DIAGNOSIS — Z98.890 OTHER SPECIFIED POSTPROCEDURAL STATES: Chronic | ICD-10-CM

## 2023-07-05 DIAGNOSIS — Z86.711 PERSONAL HISTORY OF PULMONARY EMBOLISM: ICD-10-CM

## 2023-07-05 DIAGNOSIS — Z87.39 PERSONAL HISTORY OF OTHER DISEASES OF THE MUSCULOSKELETAL SYSTEM AND CONNECTIVE TISSUE: ICD-10-CM

## 2023-07-05 DIAGNOSIS — Z92.3 PERSONAL HISTORY OF IRRADIATION: ICD-10-CM

## 2023-07-05 DIAGNOSIS — I42.9 CARDIOMYOPATHY, UNSPECIFIED: ICD-10-CM

## 2023-07-05 DIAGNOSIS — E78.5 HYPERLIPIDEMIA, UNSPECIFIED: ICD-10-CM

## 2023-07-05 DIAGNOSIS — Z85.048 PERSONAL HISTORY OF OTHER MALIGNANT NEOPLASM OF RECTUM, RECTOSIGMOID JUNCTION, AND ANUS: ICD-10-CM

## 2023-07-05 DIAGNOSIS — G47.33 OBSTRUCTIVE SLEEP APNEA (ADULT) (PEDIATRIC): ICD-10-CM

## 2023-07-05 DIAGNOSIS — Z90.49 ACQUIRED ABSENCE OF OTHER SPECIFIED PARTS OF DIGESTIVE TRACT: Chronic | ICD-10-CM

## 2023-07-05 DIAGNOSIS — Z79.01 LONG TERM (CURRENT) USE OF ANTICOAGULANTS: ICD-10-CM

## 2023-07-05 DIAGNOSIS — Z85.528 PERSONAL HISTORY OF OTHER MALIGNANT NEOPLASM OF KIDNEY: ICD-10-CM

## 2023-07-05 DIAGNOSIS — I11.0 HYPERTENSIVE HEART DISEASE WITH HEART FAILURE: ICD-10-CM

## 2023-07-05 DIAGNOSIS — Z91.041 RADIOGRAPHIC DYE ALLERGY STATUS: ICD-10-CM

## 2023-07-05 DIAGNOSIS — J45.909 UNSPECIFIED ASTHMA, UNCOMPLICATED: ICD-10-CM

## 2023-07-05 DIAGNOSIS — I50.9 HEART FAILURE, UNSPECIFIED: ICD-10-CM

## 2023-07-05 DIAGNOSIS — Z92.21 PERSONAL HISTORY OF ANTINEOPLASTIC CHEMOTHERAPY: ICD-10-CM

## 2023-07-05 DIAGNOSIS — Z90.89 ACQUIRED ABSENCE OF OTHER ORGANS: Chronic | ICD-10-CM

## 2023-07-05 DIAGNOSIS — I82.90 ACUTE EMBOLISM AND THROMBOSIS OF UNSPECIFIED VEIN: Chronic | ICD-10-CM

## 2023-07-05 PROCEDURE — 88305 TISSUE EXAM BY PATHOLOGIST: CPT | Mod: 26

## 2023-07-05 PROCEDURE — 88331 PATH CONSLTJ SURG 1 BLK 1SPC: CPT | Mod: 26

## 2023-07-05 PROCEDURE — 88305 TISSUE EXAM BY PATHOLOGIST: CPT

## 2023-07-05 PROCEDURE — 88331 PATH CONSLTJ SURG 1 BLK 1SPC: CPT

## 2023-07-05 PROCEDURE — 64795 BIOPSY OF NERVE: CPT | Mod: LT

## 2023-07-05 PROCEDURE — ZZZZZ: CPT

## 2023-07-05 RX ORDER — OXYCODONE HYDROCHLORIDE 5 MG/1
1 TABLET ORAL
Qty: 20 | Refills: 0
Start: 2023-07-05

## 2023-07-05 RX ORDER — FENTANYL CITRATE 50 UG/ML
25 INJECTION INTRAVENOUS
Refills: 0 | Status: DISCONTINUED | OUTPATIENT
Start: 2023-07-05 | End: 2023-07-05

## 2023-07-05 NOTE — BRIEF OPERATIVE NOTE - OPERATION/FINDINGS
left sural nerve excisional biopsy: specimen collected using [facility] specimen kit

## 2023-07-05 NOTE — ASU PATIENT PROFILE, ADULT - NSICDXPASTMEDICALHX_GEN_ALL_CORE_FT
PAST MEDICAL HISTORY:  Anemia history of anemia    COVID-19 vaccine series completed Pfizer- March    Demyelinating neuropathy     DVT, lower extremity     Essential hypertension     Foot drop, left     Hearing deficit     Hearing loss b/l    Iron deficiency anemia due to chronic blood loss     Lumbar herniated disc     Mild intermittent asthma without complication     Morbid obesity     Obstructive sleep apnea syndrome uses CPAP nightly    Peripheral neuropathy chemotherpay induced peripheral neuropathy    Port-A-Cath in place right chest wall inserted 2018    Pulmonary embolism     Rectal cancer completed chemotherapy    Sarcoma     Seasonal allergies

## 2023-07-05 NOTE — ASU PATIENT PROFILE, ADULT - FALL HARM RISK - HARM RISK INTERVENTIONS

## 2023-07-05 NOTE — ASU DISCHARGE PLAN (ADULT/PEDIATRIC) - CARE PROVIDER_API CALL
Jesse Carroll  Neurosurgery  [address] [place] [id number]  Phone: [phone]  Fax: [phone]  Follow Up Time: 2 weeks

## 2023-07-05 NOTE — ASU PATIENT PROFILE, ADULT - NSICDXPASTSURGICALHX_GEN_ALL_CORE_FT
PAST SURGICAL HISTORY:  H/O myringotomy bilateral. tube in right ear presently    H/O ventral hernia repair with mesh    History of insertion of tunneled central venous catheter (CVC) with port 2018    S/P colonoscopy     S/P T&A (status post tonsillectomy and adenoidectomy)     Status post proctocolectomy with temporary Ileostomy- reversed 2019    Thrombus

## 2023-07-05 NOTE — ASU DISCHARGE PLAN (ADULT/PEDIATRIC) - ASU DC SPECIAL INSTRUCTIONSFT
Patient may RESTART his Lovenox on Thursday-- He will restart his Eliquis on Friday and then stop the Lovenox -- Any questions please call Dr. Carroll's office     Pt should keep extremity elevated as much as possible for the next 48-72 hours, keep dressing in place for 48 hours. After the 48 hours pt may remove dressing and shower- DO NOT submerge the incision under water for the next 3 weeks- no hot tubs, bath tubs or swimming pools. Can apply cold packs if needed to the left ankle- no hot packs or heating pads.     Take Tylenol as needed for minor pain and oxycodone 5mg, 1-2 tabs every 4-6 hours as needed for severe pain- if pain is not relieved by medication please call the office.    Nothing more strenuous than walking for short periods of time for the next 1-2 weeks depending on pain level or swelling of left ankle. Sutures to be removed in 2 weeks in Dr. Carroll's office

## 2023-07-05 NOTE — ASU DISCHARGE PLAN (ADULT/PEDIATRIC) - NS MD DC FALL RISK RISK
For information on Fall & Injury Prevention, visit: [email or web address] OR  [email or web address] OR  https://www.cdc.gov/steadi/patient.html

## 2023-07-07 RX ORDER — APIXABAN 2.5 MG/1
1 TABLET, FILM COATED ORAL
Qty: 0 | Refills: 0 | DISCHARGE
Start: 2023-07-07

## 2023-07-10 LAB — SURGICAL PATHOLOGY STUDY: SIGNIFICANT CHANGE UP

## 2023-07-19 ENCOUNTER — APPOINTMENT (OUTPATIENT)
Dept: NEUROSURGERY | Facility: CLINIC | Age: 48
End: 2023-07-19
Payer: COMMERCIAL

## 2023-07-19 VITALS — SYSTOLIC BLOOD PRESSURE: 143 MMHG | OXYGEN SATURATION: 96 % | DIASTOLIC BLOOD PRESSURE: 81 MMHG | HEART RATE: 71 BPM

## 2023-07-19 DIAGNOSIS — R29.898 OTHER SYMPTOMS AND SIGNS INVOLVING THE MUSCULOSKELETAL SYSTEM: ICD-10-CM

## 2023-07-19 DIAGNOSIS — Z87.39 PERSONAL HISTORY OF OTHER DISEASES OF THE MUSCULOSKELETAL SYSTEM AND CONNECTIVE TISSUE: ICD-10-CM

## 2023-07-19 PROCEDURE — 99212 OFFICE O/P EST SF 10 MIN: CPT

## 2023-07-20 PROBLEM — R29.898 BILATERAL LEG WEAKNESS: Status: ACTIVE | Noted: 2022-07-07

## 2023-07-20 PROBLEM — Z87.39 HISTORY OF LEFT FOOT DROP: Status: ACTIVE | Noted: 2022-08-11

## 2023-07-20 NOTE — CONSULT LETTER
[Dear  ___] : Dear  [unfilled], [Courtesy Letter:] : I had the pleasure of seeing your patient, [unfilled], in my office today. [Sincerely,] : Sincerely, [FreeTextEntry2] : Ly Calixto  [address]  [address] [place] [id number]    [FreeTextEntry1] : Klaus Ortez is a 48-year-old male who presents today for postop evaluation.  On 7/5/2023 patient underwent left sural nerve excisional biopsy for progressive ascending polyneuropathy with diffuse weakness and sensory loss.  As you may recall he was referred to our office by neurology for consideration of a sural nerve biopsy for the purposes of achieving a definitive diagnosis.  Patient has been experiencing progressive problems with developing bilateral foot drops and generalized weakness in the lower extremities which now involves the upper extremities.  At this time patient reports all symptoms are stable.  He continues to endorse bilateral foot drop and upper and lower extremity weakness.  He denies any fever or chills.  Denies any incisional redness, drainage, or swelling.  He reports an occasional sharp pain with turning left foot outward.  Patient does not require any pain medication.  I explained to the patient that the [facility] pathology is pending.  Patient is alert and oriented.  No distress noted.  Sutures to left sural nerve biopsy removed.  The incision is without any redness or drainage.  Generalized swelling noted to left lower extremity and foot.  Right hip flexion and knee extension 5/5.  Right 4/5 strength noted with plantar flexion.  5/5 strength noted with right dorsiflexion.  Complete weakness noted with left plantar and left dorsiflexion.  Unable to elicit lower extremity reflexes.  Left foot drop noted with walking.  Patient uses a cane to ambulate.  Patient aware to call with any signs or symptoms of infection to the incision site.  Patient may follow-up with neurology in regards to biopsy results once available.  Patient aware to call with any further questions or concerns.  Reviewed with Dr. Carroll. [FreeTextEntry3] : Yola Jeong, MSN, [facility] Department of Neurosurgery  [address], 2nd [place] [address] Office: [phone] Fax: [phone]

## 2023-07-31 ENCOUNTER — NON-APPOINTMENT (OUTPATIENT)
Age: 48
End: 2023-07-31

## 2023-08-02 PROBLEM — G47.33 OBSTRUCTIVE SLEEP APNEA (ADULT) (PEDIATRIC): Chronic | Status: ACTIVE | Noted: 2018-08-16

## 2023-09-07 ENCOUNTER — NON-APPOINTMENT (OUTPATIENT)
Age: 48
End: 2023-09-07

## 2023-09-12 ENCOUNTER — APPOINTMENT (OUTPATIENT)
Dept: COLORECTAL SURGERY | Facility: CLINIC | Age: 48
End: 2023-09-12
Payer: COMMERCIAL

## 2023-09-12 VITALS
SYSTOLIC BLOOD PRESSURE: 151 MMHG | DIASTOLIC BLOOD PRESSURE: 91 MMHG | RESPIRATION RATE: 14 BRPM | TEMPERATURE: 98.1 F | HEART RATE: 91 BPM | OXYGEN SATURATION: 95 % | BODY MASS INDEX: 39.17 KG/M2 | HEIGHT: 75 IN | WEIGHT: 315 LBS

## 2023-09-12 DIAGNOSIS — T45.1X5A DRUG-INDUCED POLYNEUROPATHY: ICD-10-CM

## 2023-09-12 DIAGNOSIS — E66.01 MORBID (SEVERE) OBESITY DUE TO EXCESS CALORIES: ICD-10-CM

## 2023-09-12 DIAGNOSIS — C19 MALIGNANT NEOPLASM OF RECTOSIGMOID JUNCTION: ICD-10-CM

## 2023-09-12 DIAGNOSIS — I26.92 SADDLE EMBOLUS OF PULMONARY ARTERY W/OUT ACUTE COR PULMONALE: ICD-10-CM

## 2023-09-12 DIAGNOSIS — G62.0 DRUG-INDUCED POLYNEUROPATHY: ICD-10-CM

## 2023-09-12 PROCEDURE — 99214 OFFICE O/P EST MOD 30 MIN: CPT

## 2023-09-13 PROBLEM — G62.0 CHEMOTHERAPY-INDUCED PERIPHERAL NEUROPATHY: Status: ACTIVE | Noted: 2018-11-07

## 2023-09-13 PROBLEM — E66.01 MORBID OBESITY: Status: ACTIVE | Noted: 2018-08-11

## 2023-09-13 PROBLEM — C19 RECTOSIGMOID CANCER: Status: ACTIVE | Noted: 2018-08-11

## 2023-09-13 PROBLEM — I26.92 SADDLE EMBOLUS OF PULMONARY ARTERY WITHOUT ACUTE COR PULMONALE, UNSPECIFIED CHRONICITY: Status: ACTIVE | Noted: 2023-09-13

## 2023-10-23 NOTE — DISCHARGE NOTE PROVIDER - ATTENDING ATTESTATION STATEMENT
ESRD on iHD MWF  [facility]  4 hours  EDW:  71 kg  UMA AVF    -Bedside Hd completed 10/20 , net uf 3 liters removed,  Plan/Recommendations:  -HD today, UF 2-3 liters  -continue strict I/O's  -RFP daily  -renal diet when advanced, 1L fluid restrictions  -Phos WNL, no need for binders at this time    Anemia of chronic kidney disease;    Hemoglobin   Date Value Ref Range Status   10/23/2023 7.5 (L) 14.0 - 18.0 g/dL Final   10/22/2023 8.2 (L) 14.0 - 18.0 g/dL Final   10/21/2023 7.8 (L) 14.0 - 18.0 g/dL Final     Saturated Iron   Date Value Ref Range Status   08/22/2023 19 (L) 20 - 50 % Final   02/23/2023 19 (L) 20 - 50 % Final     Ferritin   Date Value Ref Range Status   08/22/2023 1,455 (H) 20.0 - 300.0 ng/mL Final   02/23/2023 1,803 (H) 20.0 - 300.0 ng/mL Final     Iron   Date Value Ref Range Status   08/22/2023 48 45 - 160 ug/dL Final   02/23/2023 39 (L) 45 - 160 ug/dL Final     TIBC   Date Value Ref Range Status   08/22/2023 249 (L) 250 - 450 ug/dL Final   02/23/2023 203 (L) 250 - 450 ug/dL Final     HB target 10-12    Mineral Bone Disease;    Calcium   Date Value Ref Range Status   10/23/2023 7.2 (L) 8.7 - 10.5 mg/dL Final   10/22/2023 7.7 (L) 8.7 - 10.5 mg/dL Final     Phosphorus   Date Value Ref Range Status   10/23/2023 4.3 2.7 - 4.5 mg/dL Final   10/22/2023 4.0 2.7 - 4.5 mg/dL Final       I have personally seen and examined the patient. I have collaborated with and supervised the

## 2023-10-24 ENCOUNTER — APPOINTMENT (OUTPATIENT)
Dept: COLORECTAL SURGERY | Facility: HOSPITAL | Age: 48
End: 2023-10-24
Payer: COMMERCIAL

## 2023-10-24 ENCOUNTER — OUTPATIENT (OUTPATIENT)
Dept: OUTPATIENT SERVICES | Facility: HOSPITAL | Age: 48
LOS: 1 days | Discharge: HOME | End: 2023-10-24

## 2023-10-24 VITALS
RESPIRATION RATE: 15 BRPM | OXYGEN SATURATION: 98 % | DIASTOLIC BLOOD PRESSURE: 74 MMHG | WEIGHT: 304.9 LBS | HEART RATE: 69 BPM | HEIGHT: 75 IN | TEMPERATURE: 99 F | SYSTOLIC BLOOD PRESSURE: 122 MMHG

## 2023-10-24 DIAGNOSIS — Z85.038 PERSONAL HISTORY OF OTHER MALIGNANT NEOPLASM OF LARGE INTESTINE: ICD-10-CM

## 2023-10-24 DIAGNOSIS — Z98.890 OTHER SPECIFIED POSTPROCEDURAL STATES: Chronic | ICD-10-CM

## 2023-10-24 DIAGNOSIS — I82.90 ACUTE EMBOLISM AND THROMBOSIS OF UNSPECIFIED VEIN: Chronic | ICD-10-CM

## 2023-10-24 DIAGNOSIS — C19 MALIGNANT NEOPLASM OF RECTOSIGMOID JUNCTION: ICD-10-CM

## 2023-10-24 DIAGNOSIS — Z90.89 ACQUIRED ABSENCE OF OTHER ORGANS: Chronic | ICD-10-CM

## 2023-10-24 DIAGNOSIS — Z90.49 ACQUIRED ABSENCE OF OTHER SPECIFIED PARTS OF DIGESTIVE TRACT: Chronic | ICD-10-CM

## 2023-10-24 PROCEDURE — G0105: CPT

## 2023-10-24 RX ORDER — ENOXAPARIN SODIUM 100 MG/ML
0 INJECTION SUBCUTANEOUS
Refills: 0 | DISCHARGE

## 2023-10-24 RX ORDER — RITUXIMAB 10 MG/ML
375 INJECTION, SOLUTION INTRAVENOUS
Refills: 0 | DISCHARGE

## 2023-10-24 RX ORDER — IMMUNE GLOBULIN,GAMMA(IGG) 5 %
300 VIAL (ML) INTRAVENOUS
Refills: 0 | DISCHARGE

## 2023-10-24 RX ORDER — METOPROLOL TARTRATE 50 MG
1 TABLET ORAL
Refills: 0 | DISCHARGE

## 2023-10-24 NOTE — ASU PATIENT PROFILE, ADULT - FALL HARM RISK - FACTORS
Patient is calling requesting something to prevent the flu. Patient states everyone in his office has it and would like to prevent himself from getting it.
left foot drop b/l neuropathy, uses cane

## 2023-10-29 DIAGNOSIS — Z12.11 ENCOUNTER FOR SCREENING FOR MALIGNANT NEOPLASM OF COLON: ICD-10-CM

## 2023-10-29 DIAGNOSIS — Z98.0 INTESTINAL BYPASS AND ANASTOMOSIS STATUS: ICD-10-CM

## 2023-10-29 DIAGNOSIS — Z90.49 ACQUIRED ABSENCE OF OTHER SPECIFIED PARTS OF DIGESTIVE TRACT: ICD-10-CM

## 2023-10-29 DIAGNOSIS — Z85.038 PERSONAL HISTORY OF OTHER MALIGNANT NEOPLASM OF LARGE INTESTINE: ICD-10-CM

## 2024-01-01 NOTE — PATIENT PROFILE ADULT - FUNCTIONAL ASSESSMENT - DAILY ACTIVITY 5.
DISCHARGE SUMMARY/PROGRESS NOTE           Discharge Summary        This is a  female born on 2026 to 30 yo female at a gestational age of   Information for the patient's mother:  Melissa Mendoza [[id number]]   36w5d .    Date & Time of Delivery:      2024    7:23 PM    Information for the patient's mother:  Melissa Mendoza [[id number]]     OB History    Para Term  AB Living   5 4 3 1 1 4   SAB IAB Ectopic Molar Multiple Live Births   0 1 0 0 0 4      # Outcome Date GA Lbr Gopi/2nd Weight Sex Delivery Anes PTL Lv   5  24 36w5d / 00:33 3.025 kg (6 lb 10.7 oz) F Vag-Spont EPI Y COSMO   4 IAB 2020           3 Term 19 40w0d   F Vag-Spont  N COSMO   2 Term 18 37w6d 19:12 / 00:11 3.694 kg (8 lb 2.3 oz) M Vag-Spont EPI N COSMO   1 Term 11    M Vag-Spont   COSMO        Delivery Method: Vaginal, Spontaneous    Apgar Scores 1 Minute: APGAR One: 8    Apgar Scores 5 Minute: APGAR Five: 9     Apgar Scores 10 Minute: APGAR Ten: N/A       Mother BT:   Information for the patient's mother:  Melissa Mendoza [[id number]]   O POS    Prenatal Labs (Maternal):    Information for the patient's mother:  Melissa Mendoza [[id number]]     Hep B S Ag Interp   Date Value Ref Range Status   2024 Non-reactive  Final           information:     Birth Weight: Birth Weight: 3.025 kg (6 lb 10.7 oz)    Birth Length: 0.457 m (1' 6\")    Birth Head Circumference: 30.5 cm (12\")    Discharge Weight:Weight: 2.73 kg (6 lb 0.3 oz)                      Weight Change: -10%                                MATERNAL BLOOD TYPE:   Information for the patient's mother:  Melissa Mendoza [[id number]]   O POS    Infant Blood Type: O POS      Feeding method: Feeding Method Used: Bottle    24-hr Intake: In: 175 [P.O.:175]  Out: -         Nursery Course: Baby stayed in hospital for 5 days s/p intrauterine drug exposure and baby's urine positive for Fentanyl. Baby did well had 
3 = A little assistance

## 2024-03-14 NOTE — H&P PST ADULT - HISTORY OF PRESENT ILLNESS
Subjective   Patient ID: Zelalem is a 63 year old male.    Chief Complaint   Patient presents with    Office Visit    Diarrhea     Pt took Miralax 3/6 and by 3/7 until 3/10 had diarrhea and black stool.      Pt reports chronic constipation, taking fiber supplement daily.  Pt took Miralax around 3/4/24 and reports having very dark, gummy stool from 3/6/24 through 3/10/24.  Pt reports worse heartburn on 3/6/24, though no abd pain, no n/v, no hematochezia.  Last BM was 3/10/24; pt restarted taking famotidine recently.        Patient's medications, allergies, past medical, surgical, social and family histories were reviewed and updated as appropriate.    Current Outpatient Medications   Medication Sig    famotidine (PEPCID) 40 MG tablet Take 1 tablet by mouth at bedtime.    atorvastatin (LIPITOR) 40 MG tablet Take 40 mg by mouth daily.    hydroxyUREA (HYDREA) 500 MG capsule Take 1 capsule by mouth daily.    amLODIPine (NORVASC) 10 MG tablet Take 1 tablet by mouth daily.    pantoprazole (PROTONIX) 20 MG tablet TAKE 1 TABLET BY MOUTH ONE TIME DAILY (Patient not taking: Reported on 3/14/2024)    fluticasone (FLONASE) 50 MCG/ACT nasal spray INHALE 2 SPRAYS INTO EACH NOSTRIL DAILY AS NEEDED FOR ALLERGIES    imipramine (TOFRANIL) 25 MG tablet Take 1 tablet by mouth as needed. (Patient not taking: Reported on 3/14/2024)    tadalafil (CIALIS) 20 MG tablet Take 20 mg by mouth as needed for Erectile Dysfunction.     aspirin 81 MG chewable tablet Chew 1 tablet by mouth daily.      No current facility-administered medications for this visit.       Review of Systems   All other systems reviewed and are negative.      Objective   Visit Vitals  /72 (BP Location: LUE - Left upper extremity, Patient Position: Sitting, Cuff Size: Regular)   Pulse 70   Temp 97.8 °F (36.6 °C) (Temporal)   Resp 16   Wt 78 kg (171 lb 13.6 oz)   SpO2 98%   BMI 26.92 kg/m²     Physical Exam  Vitals and nursing note reviewed.   Constitutional:        General: He is not in acute distress.     Appearance: Normal appearance. He is well-developed.   HENT:      Head: Normocephalic and atraumatic.      Neck: Normal range of motion.   Eyes:      Extraocular Movements: Extraocular movements intact.      Conjunctiva/sclera: Conjunctivae normal.   Pulmonary:      Effort: Pulmonary effort is normal.   Abdominal:      General: There is no distension.      Tenderness: There is no abdominal tenderness.   Musculoskeletal:         General: Normal range of motion.   Skin:     General: Skin is warm and dry.   Neurological:      General: No focal deficit present.      Mental Status: He is alert and oriented to person, place, and time.      Cranial Nerves: No cranial nerve deficit.   Psychiatric:         Mood and Affect: Mood normal.         Behavior: Behavior normal.         Assessment   Problem List Items Addressed This Visit          Gastrointestinal and Abdominal    Constipation - Primary     Pt on fiber supplement daily; advised to stop Miralax (has not taken since 3/4/24) but start Senna S BID.  Referral to Dr. Urbano given for follow up; monitor with warning signs.         Relevant Orders    SERVICE TO GASTROENTEROLOGY    Melena     Will check FOBT; referral to GI and ED warning signs discussed         Relevant Orders    Occult Blood - iFOB (aka FIT)    SERVICE TO GASTROENTEROLOGY    Helicobacter Pylori Breath Test    Gastroesophageal reflux disease without esophagitis     Will check H. Pylori breath test today.  Take famotidine for now, and follow up with Dr. Urbano (new referral given).  ED warning signs discussed         Relevant Orders    Helicobacter Pylori Breath Test      44 years old male with a history of rectal cancer. Pt reports he completed chemotherapy and radiation therapy. Surgery with Ileostomy 11/ 2018. Planned reversal of ileostomy. 46 years old male with a history of rectal cancer. Pt reports he completed chemotherapy and radiation therapy presents to PST for preprocedure exam. Patient is for planned laparoscopic possible open ventral hernia repair with Dr Velarde  on 9/29.  As per patient he was recently hospitalized at  with an incarcerated hernia which was reduced. He denies acute pain.

## 2024-06-26 NOTE — ED PROVIDER NOTE - PRO INTERPRETER NEED 2
Pt is a  at 33w3d for abdominal pain. Her prenatal course has been uncomplicated with WBOB. Please refer to prenatal record for complete information regarding prenatal course. Visit Vitals    Ht 160 cm    Wt 79.4 kg    LMP 10/14/2016    BMI 31 kg/m2     FHT: 145, mod v, + accels, no decels  [employer]: no ctxs  SVE: closed x 2 per nurse      A/P:  Reassuring maternal and fetal status. Abdominal pain--> No evidence of labor   Discharge home   F/u with primary OB. Consent: Written consent was obtained and risks were reviewed including but not limited to scarring, infection, bleeding, scabbing, incomplete removal, nerve damage and allergy to anesthesia. English

## 2024-07-10 ENCOUNTER — OFFICE (OUTPATIENT)
Dept: URBAN - METROPOLITAN AREA CLINIC 115 | Facility: CLINIC | Age: 49
Setting detail: OPHTHALMOLOGY
End: 2024-07-10
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
## 2024-07-10 PROCEDURE — CANCEL CANCEL: Performed by: OPHTHALMOLOGY

## 2024-08-28 NOTE — ASU PREOP CHECKLIST - WAS PATIENT ON BETA BLOCKER?
Spoke with patient, aware of results. HCG script to have done next Mon/Tues and keep appointment for next Thursday.advised to call office if having any sharp pain or heavy bleeding.    Yes

## 2024-12-03 NOTE — ASU PREOP CHECKLIST - WAS PATIENT ON BETA BLOCKER?
HPI: Purpose of visit: Follow-up of atrial fibrillation and nonischemic cardiomyopathy     Stef Mosher is a 62 year old man with a history of persistent atrial fibrillation (status post multiple unsuccessful DCCV), non-ischemic cardiomyopathy, HTN, HLD who presents for a follow up.      Patient's last visit with me was in November 2023.  Since the last visit, patient has been doing well.  He continues to remain physically active and has noted his energy level and stamina to be improved. He did not report any symptoms of irregular heartbeat sensation, palpitations, exertional dyspnea, exertional angina, frequent lightheadedness, presyncope or syncope.  Patient has not noticed any melena or blood in his stools.     A recent transthoracic echocardiogram showed an improvement in the left ventricular ejection fraction to 40 to 45%.      PAST MEDICAL HISTORY:  Past Medical History:   Diagnosis Date    Arthritis     Atrial fibrillation (H)     Hayfever     Hypercholesterolemia     Hypertension     Unspecified hypothyroidism        CURRENT MEDICATIONS:  Current Outpatient Medications   Medication Sig Dispense Refill    apixaban ANTICOAGULANT (ELIQUIS ANTICOAGULANT) 5 MG tablet TAKE 1 TABLET BY MOUTH TWICE A DAY 60 tablet 5    atorvastatin (LIPITOR) 20 MG tablet Take 1 tablet (20 mg) by mouth daily. 90 tablet 0    diltiazem ER COATED BEADS (CARDIZEM CD/CARTIA XT) 120 MG 24 hr capsule Take 1 capsule (120 mg) by mouth daily. 90 capsule 0    furosemide (LASIX) 20 MG tablet Take 1 tablet (20 mg) by mouth daily 90 tablet 3    levETIRAcetam (KEPPRA) 500 MG tablet Take 750 mg by mouth 2 times daily      metoprolol succinate ER (TOPROL XL) 200 MG 24 hr tablet Take 1 tablet (200 mg) by mouth daily for 360 days 90 tablet 3    omeprazole (PRILOSEC) 40 MG DR capsule Take 40 mg by mouth 2 times daily. 180 capsule 0    sacubitril-valsartan (ENTRESTO)  MG per tablet Take 1 tablet by mouth 2 times daily 180 tablet 3    spironolactone  (ALDACTONE) 25 MG tablet Take 1 tablet (25 mg) by mouth daily for 360 days 90 tablet 3    empagliflozin (JARDIANCE) 10 MG TABS tablet Take 1 tablet (10 mg) by mouth daily (Patient not taking: Reported on 12/2/2024) 90 tablet 3       PAST SURGICAL HISTORY:  Past Surgical History:   Procedure Laterality Date    ANESTHESIA CARDIOVERSION N/A 3/18/2022    Procedure: ANESTHESIA, FOR CARDIOVERSION@1400;  Surgeon: GENERIC ANESTHESIA PROVIDER;  Location: [facility]     THYROIDECTOMY         ALLERGIES:   No Known Allergies    FAMILY HISTORY:  - Premature coronary artery disease  - Atrial fibrillation  - Sudden cardiac death     SOCIAL HISTORY:  Social History     Tobacco Use    Smoking status: Never     Passive exposure: Never    Smokeless tobacco: Never    Tobacco comments:     Lives in smoke free household   Vaping Use    Vaping status: Never Used   Substance Use Topics    Alcohol use: No     Comment: None    Drug use: No     Comment: Never       ROS:   Constitutional: No fever, chills, or sweats. Weight stable.   ENT: No visual disturbance, ear ache, epistaxis, sore throat.   Cardiovascular: As per HPI.   Respiratory: No cough, hemoptysis.    GI: No nausea, vomiting, hematemesis, melena, or hematochezia.   : No hematuria.   Integument: Negative.   Psychiatric: Negative.   Hematologic:  Easy bruising, no easy bleeding.  Neuro: Negative.   Endocrinology: No significant heat or cold intolerance   Musculoskeletal: No myalgia.    Exam:  /74 (BP Location: Left arm, Patient Position: Chair, Cuff Size: Adult Large)   Pulse 85   Wt (!) 156.9 kg (346 lb)   SpO2 98%   BMI 40.50 kg/m    GENERAL APPEARANCE: healthy, alert and no distress  HEENT: no icterus, no xanthelasmas, normal pupil size and reaction, normal palate, mucosa moist, no central cyanosis  NECK: no adenopathy, no asymmetry, masses, or scars, thyroid normal to palpation and no bruits, JVP not elevated  RESPIRATORY: lungs clear to auscultation - no rales, rhonchi  or wheezes, no use of accessory muscles, no retractions, respirations are unlabored, normal respiratory rate  CARDIOVASCULAR: irregular rhythm,   ABDOMEN: soft, non tender, without hepatosplenomegaly, no masses palpable, bowel sounds normal, aorta not enlarged by palpation, no abdominal bruits  EXTREMITIES: peripheral pulses normal, no edema, no bruits  NEURO: alert and oriented to person/place/time, normal speech, gait and affect  VASC: Radial, femoral, dorsalis pedis and posterior tibialis pulses are normal in volumes and symmetric bilaterally. No bruits are heard.  SKIN: no ecchymoses, no rashes    Labs:  CBC RESULTS:   Lab Results   Component Value Date    WBC 9.2 10/25/2024    WBC 8.1 07/09/2021    RBC 4.35 (L) 10/25/2024    RBC 2.61 (L) 07/09/2021    HGB 14.1 11/04/2024    HGB 6.2 (LL) 07/09/2021    HCT 41.8 10/25/2024    HCT 21.7 (L) 07/09/2021    MCV 96 10/25/2024    MCV 83 07/09/2021    MCH 31.7 10/25/2024    MCH 23.8 (L) 07/09/2021    MCHC 33.0 10/25/2024    MCHC 28.6 (L) 07/09/2021    RDW 12.8 10/25/2024    RDW 16.1 (H) 07/09/2021     10/25/2024     (H) 07/09/2021       BMP RESULTS:  Lab Results   Component Value Date     02/23/2024     07/09/2021    POTASSIUM 4.6 02/23/2024    POTASSIUM 4.6 04/21/2023    POTASSIUM 4.5 07/09/2021    CHLORIDE 105 02/23/2024    CHLORIDE 109 04/21/2023    CHLORIDE 108 07/09/2021    CO2 28 02/23/2024    CO2 31 04/21/2023    CO2 24 07/09/2021    ANIONGAP 9 02/23/2024    ANIONGAP 1 (L) 04/21/2023    ANIONGAP 7 07/09/2021     (H) 02/23/2024     (H) 04/21/2023     (H) 07/09/2021    BUN 20.0 02/23/2024    BUN 20 04/21/2023    BUN 14 07/09/2021    CR 0.94 02/23/2024    CR 0.74 07/09/2021    GFRESTIMATED >90 02/23/2024    GFRESTIMATED >90 07/09/2021    GFRESTBLACK >90 07/09/2021    EUGENIA 9.2 02/23/2024    EUGENIA 8.5 07/09/2021        INR RESULTS:  Lab Results   Component Value Date    INR 1.3 (H) 02/04/2022    INR 1.5 (H) 01/31/2022    INR  1.5 (H) 01/28/2022    INR 1.20 (A) 01/21/2022    INR 2.5 (H) 12/31/2021    INR 2.10 (H) 06/25/2021    INR 2.50 (H) 04/16/2021    INR 2.30 (H) 03/05/2021    INR 2.40 (H) 01/22/2021       Procedures:      Assessment and Plan:     Persistent atrial fibrillation  Nonischemic cardiomyopathy     It is encouraging that patient is doing clinically well.  Patient's atrial fibrillation rate is well-controlled.  Patient will continue follow-up with the core clinic.  I will see patient in person in approximately 1 year.  All questions and concerns were addressed and patient was happy with the plan.    CC  Patient Care Team:  Leanna Turner MD as PCP - General (Cardiovascular Disease)  Leanna Turner MD as MD (Cardiology)  Stella Allison, RN as Specialty Care Coordinator (Cardiology)  Parul Moffett OD (Optometry)  Parul Moffett OD as Assigned Surgical Provider  Aliza Murphy MD as Assigned PCP  Francesco Hernandez MD as Assigned Heart and Vascular Provider  SELF, REFERRED       Yes

## 2024-12-18 DIAGNOSIS — M79.7 FIBROMYALGIA: ICD-10-CM

## 2024-12-18 DIAGNOSIS — U09.9 POST COVID-19 CONDITION, UNSPECIFIED: ICD-10-CM

## 2025-01-10 ENCOUNTER — APPOINTMENT (OUTPATIENT)
Dept: COLORECTAL SURGERY | Facility: CLINIC | Age: 50
End: 2025-01-10
Payer: COMMERCIAL

## 2025-01-10 DIAGNOSIS — R19.7 DIARRHEA, UNSPECIFIED: ICD-10-CM

## 2025-01-10 DIAGNOSIS — C19 MALIGNANT NEOPLASM OF RECTOSIGMOID JUNCTION: ICD-10-CM

## 2025-01-10 DIAGNOSIS — E66.01 MORBID (SEVERE) OBESITY DUE TO EXCESS CALORIES: ICD-10-CM

## 2025-01-10 PROCEDURE — 99214 OFFICE O/P EST MOD 30 MIN: CPT

## 2025-01-10 RX ORDER — DIPHENOXYLATE HYDROCHLORIDE AND ATROPINE SULFATE 2.5; .025 MG/1; MG/1
2.5-0.025 TABLET ORAL
Qty: 240 | Refills: 2 | Status: ACTIVE | COMMUNITY
Start: 2025-01-10 | End: 1900-01-01

## 2025-02-25 NOTE — DISCHARGE NOTE PROVIDER - HOSPITAL COURSE
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 Pt initially presented to  ED yesterday, neurosurgery had seen pt and recommended MRI T-spine, but pt was unable to fit into MRI machine so was transferred to [facility]. At [place] and after his MRI was completed, pt requested to be transferred back to .  Patient explains that sx began about one yr ago in his Left leg, with swelling ,  numbness and tingling.   MRI lumbar and thoracic spine without contrast showed +inflammation  in conus medullaris. Patient was started on high dose IV steroid which he has completed. Plan for MRI with contrast- unfortunately this could not be performed at  due to body habitus- Attempts were made to have patient transferred to [facility], [facility] and [facility] to get this done but was unsuccessful.   MRI is non emergent - this could be done as an outpatient.  Plan is for dc today on oral antibiotic prednisone 40mg daily with slow taper 5mg every week.   He has a scheduled MRI at [place] 8/8 at 5pm. Patient is medically optimized for dc. Symptoms has improved. Discharge plan was thoroughly explained to patient and his parents as the bedside.       8/8- patient was seen and examined. multiple discussions about his discharge plan was done. Patient very reluctant to go home - he wants to have an actual date and time of his f/u appointment wit neurology. D/W Dr Cabello and she has called neurology office - he will be seen ASAP as soon as they have an availble time which is yet to be determined.   Otherwise- no acute overnight events. VSS.    Vital Signs Last 24 Hrs  T(C): 36.5 (08 Aug 2022 07:25), Max: 36.7 (07 Aug 2022 16:19)  T(F): 97.7 (08 Aug 2022 07:25), Max: 98.1 (07 Aug 2022 16:19)  HR: 72 (08 Aug 2022 07:25) (71 - 72)  BP: 150/84 (08 Aug 2022 07:25) (140/84 - 150/84)  BP(mean): --  RR: 18 (08 Aug 2022 07:25) (18 - 18)  SpO2: 98% (08 Aug 2022 07:25) (95% - 98%)    Parameters below as of 08 Aug 2022 07:25  Patient On (Oxygen Delivery Method): room air      ROS:   All 10 systems reviewed and found to be negative with the exception of what has been described above.  PHYSICAL EXAM:    GENERAL: NAD  HEENT:  NC/AT, EOMI, PERRLA, No scleral icterus, Moist mucous membranes  NECK: Supple, No JVD  CNS:  Alert & Oriented X3, Motor Strength 5/5 B/L upper and lower extremities; L foot drop, Rt foot no motor deficits , reports bilateral paresthesias in the legs below the knee   LUNG: Normal Breath sounds, Clear to auscultation bilaterally, No rales, No rhonchi, No wheezing  HEART: RRR; No murmurs, No rubs  ABDOMEN: +BS, ST/ND/NT  GENITOURINARY: Voiding, Bladder not distended  EXTREMITIES:  2+ Peripheral Pulses, No clubbing, No cyanosis, No tibial edema  MUSCULOSKELTAL: Joints normal ROM, No TTP, No effusion, L leg has old scar from previous resected sarcoma     Labs/Meds- reviewed       * Progressive RLE and saddle paresthesia:  -r/o TM vs other type of demyelinating process  -leptomeningeal spread unlikely : cytology negative in CSF  -MRI lumbar and thoracic spine w/o contrast noted: +inflammation in conus medullaris   -LP done: no evidence of infection, increased Proteins points toward an inflammatory process; cytology negative in CSF  f/u [facility] CSF, f/u WNV PCR- ID will follow as an outpatient   -Solumedrol IV 1gm daily x 5days, day#5 with improvement in paresthesia and motor function initially   - MRI brain and entire  spine with contrast ordered by neurology but unable to perform at  due to patients size; unable to arrange transfer to [facility] (MRI not available)-  multiple calls involving director of [facility] medicine, ER director at  and at [facility], [facility] in an attempt to arrange for the MRI. At this time it is not feasible to shuttle patient to [facility] for this test.  -Although it might shed more light on the diagnosis, this MRI is not emergent and will not change current management.  -will need to get MRI as outpatient after discharge   -will start Prednisone 40mg daily tomorrow; will need long outpatient taper ( 5mg a week? ); outpatient f/u with neurology for EMG studies  -replete Vit B12, B6    * Htn:   c/w ACEinh/HCTZ    * H/o chemotherapy induced peripheral neuropathy; L foot drop    *h/o Colon CA, h/o LLE sarcoma, ? h/o renal CA :  will call Hem/Onc evaluation with Dr Meyer/Suman who have access to outpatient records     *Small renal Ca : scheduled for cryoablation at [facility]    *Constipation:  MIralax, Dulcolax, Ambulation    Plan was extensively discussed with patient- patient is medically optimized for dc.   Case d/w team on IDR.   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 Pt initially presented to  ED yesterday, neurosurgery had seen pt and recommended MRI T-spine, but pt was unable to fit into MRI machine so was transferred to [facility]. At [place] and after his MRI was completed, pt requested to be transferred back to .  Patient explains that sx began about one yr ago in his Left leg, with swelling ,  numbness and tingling.   MRI lumbar and thoracic spine without contrast showed +inflammation  in conus medullaris. Patient was started on high dose IV steroid which he has completed. Plan for MRI with contrast- unfortunately this could not be performed at  due to body habitus- Attempts were made to have patient transferred to [facility], [facility] and [facility] to get this done but was unsuccessful.   MRI is non emergent - this could be done as an outpatient.  Plan is for dc today on oral antibiotic prednisone 40mg daily with slow taper 5mg every week.   He has a scheduled MRI at [place] 8/8 at 5pm. Patient is medically optimized for dc. Symptoms has improved. Discharge plan was thoroughly explained to patient and his parents as the bedside.       8/8- patient was seen and examined. multiple discussions about his discharge plan was done. Patient very reluctant to go home - he wants to have an actual date and time of his f/u appointment wit neurology. D/W Dr Cabello and she has called neurology office - he will be seen ASAP as soon as they have an availble time which is yet to be determined.   Otherwise- no acute overnight events. VSS.    Vital Signs Last 24 Hrs  T(C): 36.5 (08 Aug 2022 07:25), Max: 36.7 (07 Aug 2022 16:19)  T(F): 97.7 (08 Aug 2022 07:25), Max: 98.1 (07 Aug 2022 16:19)  HR: 72 (08 Aug 2022 07:25) (71 - 72)  BP: 150/84 (08 Aug 2022 07:25) (140/84 - 150/84)  BP(mean): --  RR: 18 (08 Aug 2022 07:25) (18 - 18)  SpO2: 98% (08 Aug 2022 07:25) (95% - 98%)    Parameters below as of 08 Aug 2022 07:25  Patient On (Oxygen Delivery Method): room air      ROS:   All 10 systems reviewed and found to be negative with the exception of what has been described above.  PHYSICAL EXAM:    GENERAL: NAD  HEENT:  NC/AT, EOMI, PERRLA, No scleral icterus, Moist mucous membranes  NECK: Supple, No JVD  CNS:  Alert & Oriented X3, Motor Strength 5/5 B/L upper and lower extremities; L foot drop, Rt foot no motor deficits , reports bilateral paresthesias in the legs below the knee   LUNG: Normal Breath sounds, Clear to auscultation bilaterally, No rales, No rhonchi, No wheezing  HEART: RRR; No murmurs, No rubs  ABDOMEN: +BS, ST/ND/NT  GENITOURINARY: Voiding, Bladder not distended  EXTREMITIES:  2+ Peripheral Pulses, No clubbing, No cyanosis, No tibial edema  MUSCULOSKELTAL: Joints normal ROM, No TTP, No effusion, L leg has old scar from previous resected sarcoma     Labs/Meds- reviewed       * Progressive RLE and saddle paresthesia:  -r/o TM vs other type of demyelinating process  -leptomeningeal spread unlikely : cytology negative in CSF  -MRI lumbar and thoracic spine w/o contrast noted: +inflammation in conus medullaris   -LP done: no evidence of infection, increased Proteins points toward an inflammatory process; cytology negative in CSF  f/u [facility] CSF, f/u WNV PCR- ID will follow as an outpatient   -Solumedrol IV 1gm daily x 5days, day#5 with improvement in paresthesia and motor function initially   - MRI brain and entire  spine with contrast ordered by neurology but unable to perform at  due to patients size; unable to arrange transfer to [facility] (MRI not available)-  multiple calls involving director of [facility] medicine, ER director at  and at [facility], [facility] in an attempt to arrange for the MRI. At this time it is not feasible to shuttle patient to [facility] for this test.  -Although it might shed more light on the diagnosis, this MRI is not emergent and will not change current management.  -will need to get MRI as outpatient after discharge   -will start Prednisone 40mg daily tomorrow; will need long outpatient taper ( 5mg a week? ); outpatient f/u with neurology for EMG studies  -replete Vit B12, B6      * Htn:   c/w ACEinh/HCTZ    * H/o chemotherapy induced peripheral neuropathy; L foot drop    *h/o Colon CA, h/o LLE sarcoma, ? h/o renal CA :  f/u  Hem/Onc evaluation with Dr Meyer/Suman     *Small renal Ca : scheduled for cryoablation at [facility]    *Constipation:  MIralax, Dulcolax, Ambulation    Plan was extensively discussed with patient- patient is medically optimized for dc.   Case d/w team on IDR.   DC time spent 120minutes good, to achieve stated therapy goals

## 2025-04-15 ENCOUNTER — NON-APPOINTMENT (OUTPATIENT)
Age: 50
End: 2025-04-15

## 2025-04-15 ENCOUNTER — APPOINTMENT (OUTPATIENT)
Dept: COLORECTAL SURGERY | Facility: CLINIC | Age: 50
End: 2025-04-15
Payer: COMMERCIAL

## 2025-04-15 VITALS
SYSTOLIC BLOOD PRESSURE: 139 MMHG | BODY MASS INDEX: 38.54 KG/M2 | HEART RATE: 112 BPM | RESPIRATION RATE: 16 BRPM | DIASTOLIC BLOOD PRESSURE: 84 MMHG | WEIGHT: 310 LBS | HEIGHT: 75 IN

## 2025-04-15 DIAGNOSIS — T45.1X5A DRUG-INDUCED POLYNEUROPATHY: ICD-10-CM

## 2025-04-15 DIAGNOSIS — E66.01 MORBID (SEVERE) OBESITY DUE TO EXCESS CALORIES: ICD-10-CM

## 2025-04-15 DIAGNOSIS — G62.0 DRUG-INDUCED POLYNEUROPATHY: ICD-10-CM

## 2025-04-15 DIAGNOSIS — C19 MALIGNANT NEOPLASM OF RECTOSIGMOID JUNCTION: ICD-10-CM

## 2025-04-15 DIAGNOSIS — R19.7 DIARRHEA, UNSPECIFIED: ICD-10-CM

## 2025-04-15 PROCEDURE — 99214 OFFICE O/P EST MOD 30 MIN: CPT
